# Patient Record
Sex: MALE | Race: WHITE | NOT HISPANIC OR LATINO | Employment: OTHER | URBAN - NONMETROPOLITAN AREA
[De-identification: names, ages, dates, MRNs, and addresses within clinical notes are randomized per-mention and may not be internally consistent; named-entity substitution may affect disease eponyms.]

---

## 2017-01-02 ENCOUNTER — PATIENT MESSAGE (OUTPATIENT)
Dept: MEDICAL GROUP | Facility: PHYSICIAN GROUP | Age: 72
End: 2017-01-02

## 2017-01-02 DIAGNOSIS — E78.2 MIXED HYPERLIPIDEMIA: ICD-10-CM

## 2017-01-04 RX ORDER — ATORVASTATIN CALCIUM 40 MG/1
40 TABLET, FILM COATED ORAL
Qty: 90 TAB | Refills: 3 | Status: SHIPPED | OUTPATIENT
Start: 2017-01-04 | End: 2017-12-12 | Stop reason: SDUPTHER

## 2017-01-04 NOTE — TELEPHONE ENCOUNTER
From: Jamie Pacheco  To: Trudy Mitchell M.D.  Sent: 1/2/2017 9:57 AM PST  Subject: Prescription Question    Good morning Dr Mitchell,  I have 2 items for you.  1. Did you really want me to take 2 tabs twice a day (4 total) of the Triam? I have only been taking 2 tabs a day.    2. I do not have any refills for my ATORVASTATIN 40mg. I will run out in 10 days. If you would send a new script to [facility] I would appreciate it.    maddy whyte

## 2017-02-25 ENCOUNTER — OFFICE VISIT (OUTPATIENT)
Dept: URGENT CARE | Facility: PHYSICIAN GROUP | Age: 72
End: 2017-02-25
Payer: MEDICARE

## 2017-02-25 VITALS
BODY MASS INDEX: 36.33 KG/M2 | WEIGHT: 225 LBS | OXYGEN SATURATION: 93 % | TEMPERATURE: 98.2 F | RESPIRATION RATE: 16 BRPM | DIASTOLIC BLOOD PRESSURE: 88 MMHG | HEART RATE: 77 BPM | SYSTOLIC BLOOD PRESSURE: 142 MMHG

## 2017-02-25 DIAGNOSIS — S86.912A STRAIN OF LEFT KNEE, INITIAL ENCOUNTER: ICD-10-CM

## 2017-02-25 PROCEDURE — 4040F PNEUMOC VAC/ADMIN/RCVD: CPT | Mod: 8P | Performed by: PHYSICIAN ASSISTANT

## 2017-02-25 PROCEDURE — G8484 FLU IMMUNIZE NO ADMIN: HCPCS | Performed by: PHYSICIAN ASSISTANT

## 2017-02-25 PROCEDURE — 99214 OFFICE O/P EST MOD 30 MIN: CPT | Performed by: PHYSICIAN ASSISTANT

## 2017-02-25 PROCEDURE — 3017F COLORECTAL CA SCREEN DOC REV: CPT | Performed by: PHYSICIAN ASSISTANT

## 2017-02-25 PROCEDURE — G8599 NO ASA/ANTIPLAT THER USE RNG: HCPCS | Performed by: PHYSICIAN ASSISTANT

## 2017-02-25 PROCEDURE — G8432 DEP SCR NOT DOC, RNG: HCPCS | Performed by: PHYSICIAN ASSISTANT

## 2017-02-25 PROCEDURE — G8419 CALC BMI OUT NRM PARAM NOF/U: HCPCS | Performed by: PHYSICIAN ASSISTANT

## 2017-02-25 PROCEDURE — 1036F TOBACCO NON-USER: CPT | Performed by: PHYSICIAN ASSISTANT

## 2017-02-25 PROCEDURE — 1101F PT FALLS ASSESS-DOCD LE1/YR: CPT | Mod: 8P | Performed by: PHYSICIAN ASSISTANT

## 2017-02-25 RX ORDER — HYDROCODONE BITARTRATE AND ACETAMINOPHEN 10; 325 MG/1; MG/1
1 TABLET ORAL EVERY 8 HOURS PRN
Qty: 20 TAB | Refills: 0 | Status: SHIPPED | OUTPATIENT
Start: 2017-02-25 | End: 2017-04-28

## 2017-02-25 NOTE — PROGRESS NOTES
"Chief Complaint   Patient presents with   • Knee Injury     L, <1 day       HISTORY OF PRESENT ILLNESS: Patient is a 71 y.o. male who presents today for evaluation of left knee pain. Patient states he \"blew it out\" walking up the stairs about an hour prior to arrival. Patient states he had immediate pain when he took a step with his left leg blisters. He felt something pop loose. He states it doesn't feel unstable but has severe pain in his left knee with ambulation. He has a history of a surgery but not replacement on both knees. He denies any swelling, ecchymosis, or other abnormalities. He does not feel any popping or clicking in the left knee. He has not taken any over-the-counter medication.    Patient Active Problem List    Diagnosis Date Noted   • Marital problems 12/08/2016   • Obesity (BMI 30-39.9) 12/08/2016   • Obesity (BMI 35.0-39.9 without comorbidity) ([facility]) 12/08/2016   • Chronic right shoulder pain 12/03/2015   • Left hip pain 02/26/2015   • Functional constipation 02/26/2015   • Personal history of skin cancer 08/22/2014   • Elevated fasting glucose 01/07/2014   • COPD (chronic obstructive pulmonary disease) (CMS-[facility]) 01/07/2014   • Essential hypertension, benign 06/28/2013   • CAD (coronary artery disease), native coronary artery 05/19/2011   • Vitamin D deficiency 05/14/2010   • Mixed hyperlipidemia    • Personal history of malignant neoplasm of bronchus and lung    • Personal history of malignant neoplasm of prostate    • Osteoarthritis of multiple joints        Allergies:Ampicillin; Clindamycin; and Demerol    Current Outpatient Prescriptions Ordered in [facility]   Medication Sig Dispense Refill   • hydrocodone/acetaminophen (NORCO)  MG Tab Take 1 Tab by mouth every 8 hours as needed for Moderate Pain or Severe Pain. 20 Tab 0   • atorvastatin (LIPITOR) 40 MG Tab Take 1 Tab by mouth every bedtime. 90 Tab 3   • triamterene/hctz (MAXZIDE-25/DYAZIDE) 37.5-25 MG Cap Take 2 Caps by mouth 2 times a " day. (Patient taking differently: Take 1 Cap by mouth 2 times a day.) 360 Cap 3   • etodolac (LODINE) 500 MG tablet Take 1 Tab by mouth 2 times a day. 180 Tab 3   • Cholecalciferol (CVS VITAMIN D) 2000 UNIT CAPS Take 3 Caps by mouth every bedtime. For vitamin D deficiency 30 Cap 11   • OCUVITE PO Take  by mouth every day. WITH ZINC      • albuterol 108 (90 BASE) MCG/ACT Aero Soln inhalation aerosol Inhale 2 Puffs by mouth every 6 hours as needed for Shortness of Breath. 1 Inhaler 3   • ipratropium (ATROVENT) 17 MCG/ACT Aero Soln Inhale 2 Puffs by mouth every 6 hours. 1 Inhaler 3   • guaifenesin-codeine (ROBITUSSIN AC) Solution oral solution Take 5 mL by mouth at bedtime as needed for Cough. 120 mL 0   • doxycycline (VIBRAMYCIN) 100 MG Tab Take 1 Tab by mouth 2 times a day. 20 Tab 0   • albuterol (PROVENTIL) 2.5mg/3ml Nebu Soln solution for nebulization 3 mL by Nebulization route every four hours as needed for Shortness of Breath. 75 mL 0   • ipratropium (ATROVENT) 0.02 % Solution 1 mL by Nebulization route 2 times a day. 60 Vial 0   • ipratropium-albuterol (COMBIVENT RESPIMAT)  MCG/ACT Aero Soln Inhale 1 Puff by mouth 4 times a day. 1 Inhaler 11   • albuterol (PROVENTIL) 2.5mg/3ml NEBU solution for nebulization 3 mL by Nebulization route every four hours as needed for Shortness of Breath. 75 mL 3     No current [facility]-ordered facility-administered medications on file.       Past Medical History   Diagnosis Date   • Mixed hyperlipidemia      CONTROLLED   • Coronary atherosclerosis      CONTROLLED   • Personal history of malignant neoplasm of bronchus and lung      STABLE   • Personal history of malignant neoplasm of prostate      STABLE   • Exposure      ORGANIC SOLVENT EXPOSURE IN THE 1980'S BUT HEAVY DOSES OF TYLENE AND XYLENE   • EKG abnormal 2000     ABN EKG W/OLD INFERIOR MI   • Colon polyps      NONCANCEROUS COLONIC POLYS 2004,MOST RECENT COLONOSCOPY 2005 WAS NEGATIVE   • CAD (coronary artery disease)       cad with an inconclusive thallium in 2004,  treated with medical management   • Spinal fusion      C4-6, SPINAL FUSION PER DR JAY   • Osteoarthrosis involving, or with mention of more than one site, but not specified as generalized, multiple sites      CONTROLLED   • Essential hypertension, malignant      CONTROLLED   • Diverticulitis      TREATED WITH HEMICOLECTOMY   • Nephrolithiasis    • Lung cancer (CMS-HCC)      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, LEFT UPPER LOBE LOBECTOMY AND 6 WEEKS OF RADIATION IN 2001   • Prostate cancer (CMS-HCC) 2006     TREATED WITH BRACHY THERAPY   • Unspecified vitamin D deficiency 5/14/2010   • Hand pain, right 5/14/2010   • Essential hypertension, benign 6/28/2013   • Arm pain, left 1/7/2014   • Prediabetes 1/7/2014       Social History   Substance Use Topics   • Smoking status: Former Smoker -- 3.00 packs/day for 38 years     Types: Cigarettes     Quit date: 01/20/2001   • Smokeless tobacco: Never Used   • Alcohol Use: 0.0 oz/week     0 Standard drinks or equivalent per week      Comment: OCCASIONAL       Family Status   Relation Status Death Age   • Mother Other      adopted no history   • Father Other      adopted and no history     Family History   Problem Relation Age of Onset   • Adopted: Yes       ROS:   Review of Systems   Constitutional: Negative for fever, chills, weight loss and malaise/fatigue.   HENT: Negative for ear pain, nosebleeds, congestion, sore throat and neck pain.    Eyes: Negative for blurred vision.   Respiratory: Negative for cough, sputum production, shortness of breath and wheezing.    Cardiovascular: Negative for chest pain, palpitations, orthopnea and leg swelling.   Gastrointestinal: Negative for heartburn, nausea, vomiting and abdominal pain.   Genitourinary: Negative for dysuria, urgency and frequency.       Exam:  Blood pressure 142/88, pulse 77, temperature 36.8 °C (98.2 °F), resp. rate 16, weight 102.059 kg (225 lb), SpO2 93  "%.  General: Normal appearing. No distress.  HEENT: Head is grossly normal.  Pulmonary:  No respiratory distress noted.  Extremities: Full range of motion left knee. No joint line tenderness noted. No tenderness noted on the medial or lateral collateral ligaments. Pain is not worsened with valgus or varus stress or anterior/posterior drawer. No laxity noted.  Skin: No soft tissue swelling, ecchymosis, erythema, or other abnormalities noted in the area of pain.  Psych: Normal mood. Alert and oriented x3. Judgment and insight is normal.    No x-ray available today    Assessment/Plan:  Advise following up with orthopedics. Placed a referral in case it is needed. Patient is asking for something for pain but states \"even morphine doesn't help.\" Will start with Norco to have advised discussing pain with orthopedics at his initial appointment.  Discussed potential side effects of Norco including but not limited to constipation. Discussed OTC stool softeners to use as needed. Do not drive on pain medication.  1. Strain of left knee, initial encounter  REFERRAL TO ORTHOPEDICS    hydrocodone/acetaminophen (NORCO)  MG Tab         "

## 2017-04-10 ENCOUNTER — OFFICE VISIT (OUTPATIENT)
Dept: CARDIOLOGY | Facility: MEDICAL CENTER | Age: 72
End: 2017-04-10
Payer: MEDICARE

## 2017-04-10 VITALS
OXYGEN SATURATION: 95 % | HEIGHT: 66 IN | BODY MASS INDEX: 36.16 KG/M2 | DIASTOLIC BLOOD PRESSURE: 80 MMHG | HEART RATE: 81 BPM | WEIGHT: 225 LBS | SYSTOLIC BLOOD PRESSURE: 134 MMHG

## 2017-04-10 DIAGNOSIS — I25.10 CORONARY ARTERY DISEASE INVOLVING NATIVE HEART WITHOUT ANGINA PECTORIS, UNSPECIFIED VESSEL OR LESION TYPE: ICD-10-CM

## 2017-04-10 DIAGNOSIS — I10 ESSENTIAL HYPERTENSION: ICD-10-CM

## 2017-04-10 DIAGNOSIS — Z01.810 PREOPERATIVE CARDIOVASCULAR EXAMINATION: Primary | ICD-10-CM

## 2017-04-10 LAB — EKG IMPRESSION: NORMAL

## 2017-04-10 PROCEDURE — 1101F PT FALLS ASSESS-DOCD LE1/YR: CPT | Mod: 8P | Performed by: INTERNAL MEDICINE

## 2017-04-10 PROCEDURE — 99204 OFFICE O/P NEW MOD 45 MIN: CPT | Performed by: INTERNAL MEDICINE

## 2017-04-10 PROCEDURE — G8432 DEP SCR NOT DOC, RNG: HCPCS | Performed by: INTERNAL MEDICINE

## 2017-04-10 PROCEDURE — 3017F COLORECTAL CA SCREEN DOC REV: CPT | Performed by: INTERNAL MEDICINE

## 2017-04-10 PROCEDURE — G8419 CALC BMI OUT NRM PARAM NOF/U: HCPCS | Performed by: INTERNAL MEDICINE

## 2017-04-10 PROCEDURE — G8599 NO ASA/ANTIPLAT THER USE RNG: HCPCS | Performed by: INTERNAL MEDICINE

## 2017-04-10 PROCEDURE — 4040F PNEUMOC VAC/ADMIN/RCVD: CPT | Mod: 8P | Performed by: INTERNAL MEDICINE

## 2017-04-10 PROCEDURE — 1036F TOBACCO NON-USER: CPT | Performed by: INTERNAL MEDICINE

## 2017-04-10 PROCEDURE — 93000 ELECTROCARDIOGRAM COMPLETE: CPT | Performed by: INTERNAL MEDICINE

## 2017-04-10 NOTE — PROGRESS NOTES
Arrhythmia Clinic Note (New patient)     DOS: 4/10/2017    Referring physician: Lu SHIRLEY    Chief complaint/Reason for consult: Pre-operative clearance    HPI:  Patient is a 70 yo WM with history of lung cancer s/p resection, diverticulitis s/p hemicolectomy, and ?history of CAD (based on abnormal echo and thallium in 2004 that was indeterminate), who is here for pre-operative examination for L knee surgery. He has no complaints other than his knee. He says he has good functional status, able before his knee issues to climb 2-3 flights of stairs without taking a break. He rides his exercise bike about an hour each day (over 15 miles he says). He denies chest pain or excessive MULTANI during any of this activity. He has been taking a statin, but no ASA as he takes chronic NSAIDs.    ROS (+ highlighted in red):  Constitutional: Fevers/chills/fatigue/weightloss  HEENT: Blurry vision/eye pain/sore throat/hearing loss  Respiratory: Shortness of breath/cough  Cardiovascular: Chest pain/palpitations/edema/orthopnea/syncope  GI: Nausea/vomitting/diarrhea  MSK: Arthralgias/myagias/muscle weakness  Skin: Rash/sores  Neurological: Numbness/tremors/vertigo  Endocrine: Excessive thirst/polyuria/cold intolerance/heat intolerance  Psych: Depression/anxiety    Past Medical History   Diagnosis Date   • Mixed hyperlipidemia      CONTROLLED   • Coronary atherosclerosis      CONTROLLED   • Personal history of malignant neoplasm of bronchus and lung      STABLE   • Personal history of malignant neoplasm of prostate      STABLE   • Exposure      ORGANIC SOLVENT EXPOSURE IN THE 1980'S BUT HEAVY DOSES OF TYLENE AND XYLENE   • EKG abnormal 2000     ABN EKG W/OLD INFERIOR MI   • Colon polyps      NONCANCEROUS COLONIC POLYS 2004,MOST RECENT COLONOSCOPY 2005 WAS NEGATIVE   • CAD (coronary artery disease)      cad with an inconclusive thallium in 2004,  treated with medical management   • Spinal fusion      C4-6, SPINAL FUSION PER   PIERRE   • Osteoarthrosis involving, or with mention of more than one site, but not specified as generalized, multiple sites      CONTROLLED   • Essential hypertension, malignant      CONTROLLED   • Diverticulitis      TREATED WITH HEMICOLECTOMY   • Nephrolithiasis    • Lung cancer (CMS-HCC)      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, LEFT UPPER LOBE LOBECTOMY AND 6 WEEKS OF RADIATION IN 2001   • Prostate cancer (CMS-HCC) 2006     TREATED WITH BRACHY THERAPY   • Unspecified vitamin D deficiency 5/14/2010   • Hand pain, right 5/14/2010   • Essential hypertension, benign 6/28/2013   • Arm pain, left 1/7/2014   • Prediabetes 1/7/2014       Past Surgical History   Procedure Laterality Date   • Biceps tendon repair  2008     NOW DOING PHYSICAL THERAPY   • Hernia repair       ABDOMINAL INCISIONAL HERNIA REPAIR X2   • Meniscus repair  2009   • Sigmoid colectomy  2001     secondary to ruptured diverticulitis       Social History     Social History   • Marital Status:      Spouse Name: N/A   • Number of Children: N/A   • Years of Education: N/A     Occupational History   • Not on file.     Social History Main Topics   • Smoking status: Former Smoker -- 3.00 packs/day for 38 years     Types: Cigarettes     Quit date: 01/20/2001   • Smokeless tobacco: Never Used   • Alcohol Use: 0.0 oz/week     0 Standard drinks or equivalent per week      Comment: OCCASIONAL   • Drug Use: No   • Sexual Activity:     Partners: Female     Birth Control/ Protection: Post-Menopausal      Comment: , bike repair      Other Topics Concern   • Exercise Yes     bikes and hikes most days     Social History Narrative       Family History   Problem Relation Age of Onset   • Adopted: Yes       Allergies   Allergen Reactions   • Ampicillin      CAUSED A CHILDHOOD REACTION   • Clindamycin Diarrhea   • Demerol      CAUSES ECCHYMOSIS AND IS INEFFECTIVE       Current Outpatient Prescriptions   Medication Sig Dispense  "Refill   • atorvastatin (LIPITOR) 40 MG Tab Take 1 Tab by mouth every bedtime. 90 Tab 3   • triamterene/hctz (MAXZIDE-25/DYAZIDE) 37.5-25 MG Cap Take 2 Caps by mouth 2 times a day. (Patient taking differently: Take 1 Cap by mouth 2 times a day.) 360 Cap 3   • albuterol 108 (90 BASE) MCG/ACT Aero Soln inhalation aerosol Inhale 2 Puffs by mouth every 6 hours as needed for Shortness of Breath. 1 Inhaler 3   • ipratropium (ATROVENT) 17 MCG/ACT Aero Soln Inhale 2 Puffs by mouth every 6 hours. 1 Inhaler 3   • albuterol (PROVENTIL) 2.5mg/3ml Nebu Soln solution for nebulization 3 mL by Nebulization route every four hours as needed for Shortness of Breath. 75 mL 0   • ipratropium (ATROVENT) 0.02 % Solution 1 mL by Nebulization route 2 times a day. 60 Vial 0   • etodolac (LODINE) 500 MG tablet Take 1 Tab by mouth 2 times a day. 180 Tab 3   • ipratropium-albuterol (COMBIVENT RESPIMAT)  MCG/ACT Aero Soln Inhale 1 Puff by mouth 4 times a day. 1 Inhaler 11   • albuterol (PROVENTIL) 2.5mg/3ml NEBU solution for nebulization 3 mL by Nebulization route every four hours as needed for Shortness of Breath. 75 mL 3   • Cholecalciferol (CVS VITAMIN D) 2000 UNIT CAPS Take 3 Caps by mouth every bedtime. For vitamin D deficiency 30 Cap 11   • OCUVITE PO Take  by mouth every day. WITH ZINC      • hydrocodone/acetaminophen (NORCO)  MG Tab Take 1 Tab by mouth every 8 hours as needed for Moderate Pain or Severe Pain. 20 Tab 0   • guaifenesin-codeine (ROBITUSSIN AC) Solution oral solution Take 5 mL by mouth at bedtime as needed for Cough. 120 mL 0   • doxycycline (VIBRAMYCIN) 100 MG Tab Take 1 Tab by mouth 2 times a day. 20 Tab 0     No current facility-administered medications for this visit.       Physical Exam:  Filed Vitals:    04/10/17 0817   BP: 134/80   Pulse: 81   Height: 1.676 m (5' 6\")   Weight: 102.059 kg (225 lb)   SpO2: 95%     General appearance: NAD, conversant   Eyes: anicteric sclerae, moist conjunctivae; no lid-lag; " PERRLA  HENT: Atraumatic; oropharynx clear with moist mucous membranes and no mucosal ulcerations; normal hard and soft palate  Neck: Trachea midline; FROM, supple, no thyromegaly or lymphadenopathy  Lungs: CTA, with normal respiratory effort and no intercostal retractions  CV: RRR, no MRGs, no JVD   Abdomen: Soft, non-tender; no masses or HSM  Extremities: No peripheral edema or extremity lymphadenopathy  Skin: Normal temperature, turgor and texture; no rash, ulcers or subcutaneous nodules  Psych: Appropriate affect, alert and oriented to person, place and time    Data:  Labs reviewed    EKG interpreted by me:  Incomplete RBBB, LAFB, sinus    Impression/Plan:  1. Preoperative cardiovascular examination     2. Coronary artery disease involving native heart without angina pectoris, unspecified vessel or lesion type  EKG   3. Essential hypertension       -I do not think he has significant obstructive CAD  -Furthermore, his functional status is >4 METS and for moderate risk surgery like his orthopedic procedure no further cardiac w/u needs to be done beforehand  -He is cleared for surgery  -I would like to get the records from his 2004 Thallium to confirm whether or not obstructive CAD is present or not so that this can be addressed or erased from his record    Shameka Sanchez MD

## 2017-04-10 NOTE — MR AVS SNAPSHOT
"        Jamie Pacheco   4/10/2017 8:20 AM   Office Visit   MRN: [id number]    Department:  [facility]   Dept Phone:  [phone]    Description:  Male : 1945   Provider:  Shameka Sanchez M.D.           Reason for Visit     Follow-Up           Allergies as of 4/10/2017     Allergen Noted Reactions    Ampicillin 2009       CAUSED A CHILDHOOD REACTION    Clindamycin 2009   Diarrhea    Demerol 2009       CAUSES ECCHYMOSIS AND IS INEFFECTIVE      You were diagnosed with     Essential hypertension   [[id number]]       Coronary artery disease involving native heart without angina pectoris, unspecified vessel or lesion type   [[id number]]         Vital Signs     Blood Pressure Pulse Height Weight Body Mass Index Oxygen Saturation    134/80 mmHg 81 1.676 m (5' 6\") 102.059 kg (225 lb) 36.33 kg/m2 95%    Smoking Status                   Former Smoker           Basic Information     Date Of Birth Sex Race Ethnicity Preferred Language    1945 Male White Non- English      Your appointments     2017  6:15 AM   Adult Draw/Collection with [facility] (--)    [address] [place] [address] [place] [address]   [phone]            2017  7:15 AM   Scheduled Pre Admission with [facility]   [facility] (--)    [address]  [place] [address]   [phone]            2017  8:00 AM   NEW TO YOU with DONNA Jarquin   [facility] [place]    [address]  [place] [id number] [phone]            2017  7:40 AM   Established Patient with DONNA Holt   [facility] [place])    [address]  [place] [address] [phone]           You will be receiving a confirmation call a few days before your appointment from our automated call confirmation system.              Problem List              ICD-10-CM Priority Class Noted - Resolved    Mixed hyperlipidemia E78.2 "   Unknown - Present    Personal history of malignant neoplasm of bronchus and lung Z85.118   Unknown - Present    Personal history of malignant neoplasm of prostate Z85.46   Unknown - Present    Osteoarthritis of multiple joints M15.9   Unknown - Present    Vitamin D deficiency (Chronic) E55.9   5/14/2010 - Present    CAD (coronary artery disease), native coronary artery I25.10   5/19/2011 - Present    Essential hypertension, benign I10   6/28/2013 - Present    Elevated fasting glucose R73.01   1/7/2014 - Present    COPD (chronic obstructive pulmonary disease) (CMS-[facility]) J44.9   1/7/2014 - Present    Personal history of skin cancer Z85.828   8/22/2014 - Present    Left hip pain M25.552   2/26/2015 - Present    Functional constipation K59.04   2/26/2015 - Present    Chronic right shoulder pain M25.511, G89.29   12/3/2015 - Present    Marital problems Z63.0   12/8/2016 - Present    Obesity (BMI 30-39.9) E66.9   12/8/2016 - Present    Obesity (BMI 35.0-39.9 without comorbidity) ([facility]) E66.9   12/8/2016 - Present      Health Maintenance        Date Due Completion Dates    IMM DTaP/Tdap/Td Vaccine (1 - Tdap) 12/10/1966 ---    IMM ZOSTER VACCINE 12/10/2007 ---    IMM PNEUMOCOCCAL 65+ (ADULT) LOW/MEDIUM RISK SERIES (1 of 2 - PCV13) 12/10/2012 ---    COLONOSCOPY 4/15/2021 4/15/2016            Results       Current Immunizations     Pneumococcal Vaccine (UF)Historical Data 1/1/2004      Below and/or attached are the medications your provider expects you to take. Review all of your home medications and newly ordered medications with your provider and/or pharmacist. Follow medication instructions as directed by your provider and/or pharmacist. Please keep your medication list with you and share with your provider. Update the information when medications are discontinued, doses are changed, or new medications (including over-the-counter products) are added; and carry medication information at all times in the event of emergency  situations     Allergies:  AMPICILLIN - (reactions not documented)     CLINDAMYCIN - Diarrhea     DEMEROL - (reactions not documented)               Medications  Valid as of: April 10, 2017 -  8:47 AM    Generic Name Brand Name Tablet Size Instructions for use    Albuterol Sulfate (Nebu Soln) PROVENTIL 2.5mg/3ml 3 mL by Nebulization route every four hours as needed for Shortness of Breath.        Albuterol Sulfate (Nebu Soln) PROVENTIL 2.5mg/3ml 3 mL by Nebulization route every four hours as needed for Shortness of Breath.        Albuterol Sulfate (Aero Soln) albuterol 108 (90 BASE) MCG/ACT Inhale 2 Puffs by mouth every 6 hours as needed for Shortness of Breath.        Atorvastatin Calcium (Tab) LIPITOR 40 MG Take 1 Tab by mouth every bedtime.        Cholecalciferol (Cap) Cholecalciferol 2000 UNIT Take 3 Caps by mouth every bedtime. For vitamin D deficiency        Doxycycline Hyclate (Tab) VIBRAMYCIN 100 MG Take 1 Tab by mouth 2 times a day.        Etodolac (Tab) LODINE 500 MG Take 1 Tab by mouth 2 times a day.        Guaifenesin-Codeine (Solution) ROBITUSSIN -10 mg/5mL Take 5 mL by mouth at bedtime as needed for Cough.        Hydrocodone-Acetaminophen (Tab) NORCO  MG Take 1 Tab by mouth every 8 hours as needed for Moderate Pain or Severe Pain.        Ipratropium Bromide (Solution) ATROVENT 0.02 % 1 mL by Nebulization route 2 times a day.        Ipratropium Bromide HFA (Aero Soln) ATROVENT 17 MCG/ACT Inhale 2 Puffs by mouth every 6 hours.        Ipratropium-Albuterol (Aero Soln) COMBIVENT RESPIMAT  MCG/ACT Inhale 1 Puff by mouth 4 times a day.        Multiple Vitamins-Minerals   Take  by mouth every day. WITH ZINC         Triamterene-HCTZ (Cap) MAXZIDE-25/DYAZIDE 37.5-25 MG Take 2 Caps by mouth 2 times a day.        .                 Medicines prescribed today were sent to:     [facility] [place], [place] - [address]    [address] [place] [address]    Phone:  [phone] Fax: [phone]    Open 24 Hours?: No      Medication refill instructions:       If your prescription bottle indicates you have medication refills left, it is not necessary to call your provider’s office. Please contact your pharmacy and they will refill your medication.    If your prescription bottle indicates you do not have any refills left, you may request refills at any time through one of the following ways: The online Birdbox system (except Urgent Care), by calling your provider’s office, or by asking your pharmacy to contact your provider’s office with a refill request. Medication refills are processed only during regular business hours and may not be available until the next business day. Your provider may request additional information or to have a follow-up visit with you prior to refilling your medication.   *Please Note: Medication refills are assigned a new Rx number when refilled electronically. Your pharmacy may indicate that no refills were authorized even though a new prescription for the same medication is available at the pharmacy. Please request the medicine by name with the pharmacy before contacting your provider for a refill.           Birdbox Access Code: Activation code not generated  Current Birdbox Status: Active

## 2017-04-20 ENCOUNTER — HOSPITAL ENCOUNTER (OUTPATIENT)
Dept: LAB | Facility: MEDICAL CENTER | Age: 72
End: 2017-04-20
Attending: INTERNAL MEDICINE
Payer: MEDICARE

## 2017-04-20 DIAGNOSIS — R73.01 ELEVATED FASTING GLUCOSE: ICD-10-CM

## 2017-04-20 DIAGNOSIS — E55.9 VITAMIN D DEFICIENCY: Chronic | ICD-10-CM

## 2017-04-20 DIAGNOSIS — E78.2 MIXED HYPERLIPIDEMIA: ICD-10-CM

## 2017-04-20 LAB
25(OH)D3 SERPL-MCNC: 38 NG/ML (ref 30–100)
EST. AVERAGE GLUCOSE BLD GHB EST-MCNC: 131 MG/DL
HBA1C MFR BLD: 6.2 % (ref 0–5.6)

## 2017-04-20 PROCEDURE — 80061 LIPID PANEL: CPT

## 2017-04-20 PROCEDURE — 83036 HEMOGLOBIN GLYCOSYLATED A1C: CPT | Mod: GA

## 2017-04-20 PROCEDURE — 36415 COLL VENOUS BLD VENIPUNCTURE: CPT | Mod: GA

## 2017-04-20 PROCEDURE — 82306 VITAMIN D 25 HYDROXY: CPT

## 2017-04-20 PROCEDURE — 80053 COMPREHEN METABOLIC PANEL: CPT

## 2017-04-21 ENCOUNTER — HOSPITAL ENCOUNTER (OUTPATIENT)
Dept: RADIOLOGY | Facility: MEDICAL CENTER | Age: 72
End: 2017-04-21
Attending: INTERNAL MEDICINE | Admitting: ORTHOPAEDIC SURGERY
Payer: MEDICARE

## 2017-04-21 DIAGNOSIS — Z01.812 PRE-OPERATIVE LABORATORY EXAMINATION: ICD-10-CM

## 2017-04-21 DIAGNOSIS — J43.9 PULMONARY EMPHYSEMA, UNSPECIFIED EMPHYSEMA TYPE (HCC): ICD-10-CM

## 2017-04-21 LAB
ALBUMIN SERPL BCP-MCNC: 4.3 G/DL (ref 3.2–4.9)
ALBUMIN/GLOB SERPL: 1.3 G/DL
ALP SERPL-CCNC: 54 U/L (ref 30–99)
ALT SERPL-CCNC: 24 U/L (ref 2–50)
ANION GAP SERPL CALC-SCNC: 10 MMOL/L (ref 0–11.9)
ANION GAP SERPL CALC-SCNC: 8 MMOL/L (ref 0–11.9)
APPEARANCE UR: CLEAR
AST SERPL-CCNC: 21 U/L (ref 12–45)
BILIRUB SERPL-MCNC: 0.7 MG/DL (ref 0.1–1.5)
BILIRUB UR QL STRIP.AUTO: ABNORMAL
BUN SERPL-MCNC: 15 MG/DL (ref 8–22)
BUN SERPL-MCNC: 15 MG/DL (ref 8–22)
CALCIUM SERPL-MCNC: 9.8 MG/DL (ref 8.5–10.5)
CALCIUM SERPL-MCNC: 9.9 MG/DL (ref 8.5–10.5)
CHLORIDE SERPL-SCNC: 101 MMOL/L (ref 96–112)
CHLORIDE SERPL-SCNC: 103 MMOL/L (ref 96–112)
CHOLEST SERPL-MCNC: 116 MG/DL (ref 100–199)
CO2 SERPL-SCNC: 27 MMOL/L (ref 20–33)
CO2 SERPL-SCNC: 30 MMOL/L (ref 20–33)
COLOR UR: ABNORMAL
CREAT SERPL-MCNC: 1.17 MG/DL (ref 0.5–1.4)
CREAT SERPL-MCNC: 1.18 MG/DL (ref 0.5–1.4)
ERYTHROCYTE [DISTWIDTH] IN BLOOD BY AUTOMATED COUNT: 40.8 FL (ref 35.9–50)
GFR SERPL CREATININE-BSD FRML MDRD: >60 ML/MIN/1.73 M 2
GFR SERPL CREATININE-BSD FRML MDRD: >60 ML/MIN/1.73 M 2
GLOBULIN SER CALC-MCNC: 3.3 G/DL (ref 1.9–3.5)
GLUCOSE SERPL-MCNC: 92 MG/DL (ref 65–99)
GLUCOSE SERPL-MCNC: 93 MG/DL (ref 65–99)
GLUCOSE UR STRIP.AUTO-MCNC: NEGATIVE MG/DL
HCT VFR BLD AUTO: 46.1 % (ref 42–52)
HDLC SERPL-MCNC: 38 MG/DL
HGB BLD-MCNC: 15.5 G/DL (ref 14–18)
KETONES UR STRIP.AUTO-MCNC: NEGATIVE MG/DL
LDLC SERPL CALC-MCNC: 45 MG/DL
LEUKOCYTE ESTERASE UR QL STRIP.AUTO: NEGATIVE
MCH RBC QN AUTO: 28.5 PG (ref 27–33)
MCHC RBC AUTO-ENTMCNC: 33.6 G/DL (ref 33.7–35.3)
MCV RBC AUTO: 84.7 FL (ref 81.4–97.8)
MICRO URNS: ABNORMAL
NITRITE UR QL STRIP.AUTO: NEGATIVE
PH UR STRIP.AUTO: 7 [PH]
PLATELET # BLD AUTO: 271 K/UL (ref 164–446)
PMV BLD AUTO: 10.9 FL (ref 9–12.9)
POTASSIUM SERPL-SCNC: 3.7 MMOL/L (ref 3.6–5.5)
POTASSIUM SERPL-SCNC: 4.1 MMOL/L (ref 3.6–5.5)
PROT SERPL-MCNC: 7.6 G/DL (ref 6–8.2)
PROT UR QL STRIP: NEGATIVE MG/DL
RBC # BLD AUTO: 5.44 M/UL (ref 4.7–6.1)
RBC UR QL AUTO: NEGATIVE
SCCMEC + MECA PNL NOSE NAA+PROBE: NEGATIVE
SCCMEC + MECA PNL NOSE NAA+PROBE: POSITIVE
SODIUM SERPL-SCNC: 138 MMOL/L (ref 135–145)
SODIUM SERPL-SCNC: 141 MMOL/L (ref 135–145)
SP GR UR STRIP.AUTO: 1.01
TRIGL SERPL-MCNC: 165 MG/DL (ref 0–149)
WBC # BLD AUTO: 10.7 K/UL (ref 4.8–10.8)

## 2017-04-21 PROCEDURE — 85027 COMPLETE CBC AUTOMATED: CPT

## 2017-04-21 PROCEDURE — 36415 COLL VENOUS BLD VENIPUNCTURE: CPT

## 2017-04-21 PROCEDURE — 81003 URINALYSIS AUTO W/O SCOPE: CPT

## 2017-04-21 PROCEDURE — 87086 URINE CULTURE/COLONY COUNT: CPT

## 2017-04-21 PROCEDURE — 87641 MR-STAPH DNA AMP PROBE: CPT

## 2017-04-21 PROCEDURE — 80048 BASIC METABOLIC PNL TOTAL CA: CPT

## 2017-04-21 PROCEDURE — 71020 DX-CHEST-2 VIEWS: CPT

## 2017-04-21 PROCEDURE — 87640 STAPH A DNA AMP PROBE: CPT | Mod: XU

## 2017-04-21 NOTE — OR NURSING
Pre-admit appointment completed. Pt instructed to continue regularly prescribed medications through the day before surgery. Pt instructed to take the following medications the day of surgery with a sip of water, per anesthesia protocol; If needed: atrovent nebulizer/MDI, albuterol nebulizer/MDI, or norco.     Pt to bring rescue inhalers DOS.    JUNE education given. Encourage pt to F/U with his PCP regarding score on JUNE screening tool. Pt voices understanding.

## 2017-04-23 LAB
BACTERIA UR CULT: NORMAL
SIGNIFICANT IND 70042: NORMAL
SITE SITE: NORMAL
SOURCE SOURCE: NORMAL

## 2017-04-28 ENCOUNTER — OFFICE VISIT (OUTPATIENT)
Dept: MEDICAL GROUP | Facility: PHYSICIAN GROUP | Age: 72
End: 2017-04-28
Payer: MEDICARE

## 2017-04-28 VITALS
TEMPERATURE: 98.4 F | OXYGEN SATURATION: 96 % | WEIGHT: 225 LBS | RESPIRATION RATE: 16 BRPM | SYSTOLIC BLOOD PRESSURE: 142 MMHG | HEIGHT: 66 IN | BODY MASS INDEX: 36.16 KG/M2 | HEART RATE: 85 BPM | DIASTOLIC BLOOD PRESSURE: 86 MMHG

## 2017-04-28 DIAGNOSIS — J43.9 PULMONARY EMPHYSEMA, UNSPECIFIED EMPHYSEMA TYPE (HCC): ICD-10-CM

## 2017-04-28 DIAGNOSIS — I10 ESSENTIAL HYPERTENSION, BENIGN: ICD-10-CM

## 2017-04-28 DIAGNOSIS — M25.562 PAIN IN LATERAL PORTION OF LEFT KNEE: ICD-10-CM

## 2017-04-28 DIAGNOSIS — E78.2 MIXED HYPERLIPIDEMIA: ICD-10-CM

## 2017-04-28 DIAGNOSIS — I25.119 CORONARY ARTERY DISEASE INVOLVING NATIVE CORONARY ARTERY OF NATIVE HEART WITH ANGINA PECTORIS (HCC): ICD-10-CM

## 2017-04-28 DIAGNOSIS — E66.9 OBESITY (BMI 35.0-39.9 WITHOUT COMORBIDITY): ICD-10-CM

## 2017-04-28 DIAGNOSIS — R73.01 ELEVATED FASTING GLUCOSE: ICD-10-CM

## 2017-04-28 DIAGNOSIS — M15.9 PRIMARY OSTEOARTHRITIS INVOLVING MULTIPLE JOINTS: ICD-10-CM

## 2017-04-28 PROCEDURE — G8432 DEP SCR NOT DOC, RNG: HCPCS | Performed by: NURSE PRACTITIONER

## 2017-04-28 PROCEDURE — 4040F PNEUMOC VAC/ADMIN/RCVD: CPT | Mod: 8P | Performed by: NURSE PRACTITIONER

## 2017-04-28 PROCEDURE — 99214 OFFICE O/P EST MOD 30 MIN: CPT | Performed by: NURSE PRACTITIONER

## 2017-04-28 PROCEDURE — G8419 CALC BMI OUT NRM PARAM NOF/U: HCPCS | Performed by: NURSE PRACTITIONER

## 2017-04-28 PROCEDURE — G8598 ASA/ANTIPLAT THER USED: HCPCS | Performed by: NURSE PRACTITIONER

## 2017-04-28 PROCEDURE — 1101F PT FALLS ASSESS-DOCD LE1/YR: CPT | Performed by: NURSE PRACTITIONER

## 2017-04-28 PROCEDURE — 3017F COLORECTAL CA SCREEN DOC REV: CPT | Performed by: NURSE PRACTITIONER

## 2017-04-28 PROCEDURE — 1036F TOBACCO NON-USER: CPT | Performed by: NURSE PRACTITIONER

## 2017-04-28 RX ORDER — OXYCODONE HYDROCHLORIDE AND ACETAMINOPHEN 5; 325 MG/1; MG/1
TABLET ORAL
Status: ON HOLD | COMMUNITY
Start: 2017-04-27 | End: 2017-05-05

## 2017-04-28 RX ORDER — CELECOXIB 200 MG/1
CAPSULE ORAL
Status: ON HOLD | COMMUNITY
Start: 2017-04-27 | End: 2017-05-05

## 2017-04-28 RX ORDER — ASPIRIN 325 MG
TABLET ORAL
COMMUNITY
Start: 2017-04-27 | End: 2017-12-12

## 2017-04-28 ASSESSMENT — PATIENT HEALTH QUESTIONNAIRE - PHQ9: CLINICAL INTERPRETATION OF PHQ2 SCORE: 0

## 2017-04-28 NOTE — PATIENT INSTRUCTIONS
Follow up with me in 6 months, sooner if needed    Labs before follow up visit    Continue meds--call for refills as needed

## 2017-04-28 NOTE — MR AVS SNAPSHOT
"        Jamie Pacheco   2017 8:00 AM   Office Visit   MRN: [id number]    Department:  [facility]   Dept Phone:  [phone]    Description:  Male : 1945   Provider:  DONNA Jarquin           Reason for Visit     Establish Care fv chest xray      Allergies as of 2017     Allergen Noted Reactions    Ampicillin 2009       CAUSED A CHILDHOOD REACTION    Clindamycin 2009   Diarrhea    Demerol 2009       CAUSES ECCHYMOSIS AND IS INEFFECTIVE      You were diagnosed with     Mixed hyperlipidemia   [272.2.ICD-9-CM]       Essential hypertension, benign   [401.1.ICD-9-CM]       Obesity (BMI 35.0-39.9 without comorbidity) ([facility])   [981887]       Pain in lateral portion of left knee   [463352]       Primary osteoarthritis involving multiple joints   [0381493]       Coronary artery disease involving native coronary artery of native heart with angina pectoris (CMS-HCC)   [7139468]       Pulmonary emphysema, unspecified emphysema type (CMS-HCC)   [7407715]       Elevated fasting glucose   [545657]         Vital Signs     Blood Pressure Pulse Temperature Respirations Height Weight    142/86 mmHg 85 36.9 °C (98.4 °F) 16 1.676 m (5' 6\") 102.059 kg (225 lb)    Body Mass Index Oxygen Saturation Smoking Status             36.33 kg/m2 96% Former Smoker         Basic Information     Date Of Birth Sex Race Ethnicity Preferred Language    1945 Male White Non- English      Your appointments     2017  7:40 AM   Established Patient with DONNA Holt   [facility] ([place])    [address] [place] [id number] [phone]           You will be receiving a confirmation call a few days before your appointment from our automated call confirmation system.              Problem List              ICD-10-CM Priority Class Noted - Resolved    Mixed hyperlipidemia E78.2   Unknown - Present    Personal history of malignant neoplasm of " bronchus and lung Z85.118   Unknown - Present    Personal history of malignant neoplasm of prostate Z85.46   Unknown - Present    Osteoarthritis of multiple joints M15.9   Unknown - Present    Vitamin D deficiency (Chronic) E55.9   5/14/2010 - Present    CAD (coronary artery disease), native coronary artery I25.10   5/19/2011 - Present    Essential hypertension, benign I10   6/28/2013 - Present    Elevated fasting glucose R73.01   1/7/2014 - Present    COPD (chronic obstructive pulmonary disease) (CMS-[facility]) J44.9   1/7/2014 - Present    Personal history of skin cancer Z85.828   8/22/2014 - Present    Left hip pain M25.552   2/26/2015 - Present    Functional constipation K59.04   2/26/2015 - Present    Chronic right shoulder pain M25.511, G89.29   12/3/2015 - Present    Marital problems Z63.0   12/8/2016 - Present    Obesity (BMI 30-39.9) E66.9   12/8/2016 - Present    Obesity (BMI 35.0-39.9 without comorbidity) ([facility]) E66.9   12/8/2016 - Present    Pain in lateral portion of left knee M25.562   4/28/2017 - Present      Health Maintenance        Date Due Completion Dates    IMM DTaP/Tdap/Td Vaccine (1 - Tdap) 12/10/1966 ---    IMM ZOSTER VACCINE 12/10/2007 ---    IMM PNEUMOCOCCAL 65+ (ADULT) LOW/MEDIUM RISK SERIES (1 of 2 - PCV13) 12/10/2012 ---    COLONOSCOPY 4/15/2021 4/15/2016            Current Immunizations     Pneumococcal Vaccine (UF)Historical Data 1/1/2004      Below and/or attached are the medications your provider expects you to take. Review all of your home medications and newly ordered medications with your provider and/or pharmacist. Follow medication instructions as directed by your provider and/or pharmacist. Please keep your medication list with you and share with your provider. Update the information when medications are discontinued, doses are changed, or new medications (including over-the-counter products) are added; and carry medication information at all times in the event of emergency situations      Allergies:  AMPICILLIN - (reactions not documented)     CLINDAMYCIN - Diarrhea     DEMEROL - (reactions not documented)               Medications  Valid as of: April 28, 2017 -  8:29 AM    Generic Name Brand Name Tablet Size Instructions for use    Albuterol Sulfate (Nebu Soln) PROVENTIL 2.5mg/3ml 3 mL by Nebulization route every four hours as needed for Shortness of Breath.        Albuterol Sulfate (Aero Soln) albuterol 108 (90 BASE) MCG/ACT Inhale 2 Puffs by mouth every 6 hours as needed for Shortness of Breath.        Aspirin (Tab)  MG         Atorvastatin Calcium (Tab) LIPITOR 40 MG Take 1 Tab by mouth every bedtime.        Celecoxib (Cap) CELEBREX 200 MG         Cholecalciferol (Cap) vitamin D3 5000 UNITS Take 1 Cap by mouth every day.        Etodolac (Tab) LODINE 500 MG Take 1 Tab by mouth 2 times a day.        Ipratropium Bromide (Solution) ATROVENT 0.02 % 1 mL by Nebulization route 2 times a day.        Ipratropium Bromide HFA (Aero Soln) ATROVENT 17 MCG/ACT Inhale 2 Puffs by mouth every 6 hours.        Multiple Vitamins-Minerals   Take  by mouth every day. WITH ZINC         Mupirocin (Ointment) BACTROBAN 2 %         Oxycodone-Acetaminophen (Tab) PERCOCET 5-325 MG         Probiotic Product   Take  by mouth every day.        Simethicone   Take  by mouth as needed.        Triamterene-HCTZ (Cap) MAXZIDE-25/DYAZIDE 37.5-25 MG Take 2 Caps by mouth 2 times a day.        .                 Medicines prescribed today were sent to:     [facility] [address] [place] [id number]    Phone: [phone] Fax: [phone]    Open 24 Hours?: No      Medication refill instructions:       If your prescription bottle indicates you have medication refills left, it is not necessary to call your provider’s office. Please contact your pharmacy and they will refill your medication.    If your prescription bottle indicates you do not have any refills left,  you may request refills at any time through one of the following ways: The online [employer] system (except Urgent Care), by calling your provider’s office, or by asking your pharmacy to contact your provider’s office with a refill request. Medication refills are processed only during regular business hours and may not be available until the next business day. Your provider may request additional information or to have a follow-up visit with you prior to refilling your medication.   *Please Note: Medication refills are assigned a new Rx number when refilled electronically. Your pharmacy may indicate that no refills were authorized even though a new prescription for the same medication is available at the pharmacy. Please request the medicine by name with the pharmacy before contacting your provider for a refill.        Your To Do List     Future Labs/Procedures Complete By Expires    COMP METABOLIC PANEL  As directed 4/28/2018    HEMOGLOBIN A1C  As directed 4/28/2018    LIPID PROFILE  As directed 4/28/2018      Instructions    Follow up with me in 6 months, sooner if needed    Labs before follow up visit    Continue meds--call for refills as needed              [employer] Access Code: Activation code not generated  Current [employer] Status: Active

## 2017-04-28 NOTE — ASSESSMENT & PLAN NOTE
This is a chronic condition, stable and well-controlled on current regimen. He does denies symptoms of hypertension. He does not need refills at this time, they can call when needed. We reviewed labs today, they are all essentially within normal limits. We will repeat in 6 months, these have been ordered.

## 2017-04-28 NOTE — ASSESSMENT & PLAN NOTE
Patient has significant pain in his left knee due to osteoarthritis and has upcoming knee replacement surgery scheduled for May 5. He has been cleared by cardiology.

## 2017-04-28 NOTE — ASSESSMENT & PLAN NOTE
This is a chronic condition, stable and well controlled on statin therapy. His lipid profile is as follows:  Component      Latest Ref Rng 4/20/2017           6:08 AM   Cholesterol,Tot      100 - 199 mg/dL 116   Triglycerides      0 - 149 mg/dL 165 (H)   HDL      >=40 mg/dL 38 (A)   LDL      <100 mg/dL 45   He does not need refills on his medication at this time, but can call when needed. He is to continue on low-fat, low-cholesterol diet. We will repeat this in 6 months.

## 2017-04-28 NOTE — ASSESSMENT & PLAN NOTE
This is chronic, uncontrolled. His weight is 225, BMI 36.32. He does understand that his obesity has a negative effect on his health and is currently trying to lose weight. He is trying to eat a healthier diet and states that his symptoms he recovers from his upcoming knee surgery he is going to increase his physical activity. We'll reassess this in 6 months has follow-up appointment.

## 2017-04-28 NOTE — PROGRESS NOTES
Chief Complaint   Patient presents with   • Establish Care     fv chest xray         This is a 71 y.o.male patient that presents today with the following: Establish care with new PCP, discuss acute and chronic conditions, review labs    CAD (coronary artery disease), native coronary artery  This is a chronic condition, currently stable and he is followed by cardiology. In fact he just had an appointment for clearance for upcoming knee surgery. He tolerates all of his medications well with no significant bothersome side effects.    COPD (chronic obstructive pulmonary disease)  This is a chronic condition, stable and well-controlled on current medications including albuterol and Atrovent. He tolerates his medications well with no significant bothersome side effects. He does not need refills at this time, but can call when needed. We will monitor this at least every 6 months and as needed.    Elevated fasting glucose  Patient has history of elevated fasting glucose, his most recent hemoglobin A1c was 6.2%. He is going to continue working on lifestyle modifications including healthy diet and continued efforts towards weight loss. It is difficult for him at this time to increase his physical activity due to left knee pain. He has upcoming left knee replacement surgery scheduled for May 5. He plans on being able to really work on weight loss once he is recovered from the surgery.    Essential hypertension, benign  This is a chronic condition, stable and well-controlled on current regimen. He does denies symptoms of hypertension. He does not need refills at this time, they can call when needed. We reviewed labs today, they are all essentially within normal limits. We will repeat in 6 months, these have been ordered.    Mixed hyperlipidemia  This is a chronic condition, stable and well controlled on statin therapy. His lipid profile is as follows:  Component      Latest Ref Rng 4/20/2017           6:08 AM    Cholesterol,Tot      100 - 199 mg/dL 116   Triglycerides      0 - 149 mg/dL 165 (H)   HDL      >=40 mg/dL 38 (A)   LDL      <100 mg/dL 45   He does not need refills on his medication at this time, but can call when needed. He is to continue on low-fat, low-cholesterol diet. We will repeat this in 6 months.    Obesity (BMI 35.0-39.9 without comorbidity) (HCC)  This is chronic, uncontrolled. His weight is 225, BMI 36.32. He does understand that his obesity has a negative effect on his health and is currently trying to lose weight. He is trying to eat a healthier diet and states that his symptoms he recovers from his upcoming knee surgery he is going to increase his physical activity. We'll reassess this in 6 months has follow-up appointment.    Pain in lateral portion of left knee  Patient has significant pain in his left knee due to osteoarthritis and has upcoming knee replacement surgery scheduled for May 5. He has been cleared by cardiology.      Orders Only on 04/21/2017   Component Date Value   • Sodium 04/21/2017 138    • Potassium 04/21/2017 3.7    • Chloride 04/21/2017 103    • Co2 04/21/2017 27    • Glucose 04/21/2017 92    • Bun 04/21/2017 15    • Creatinine 04/21/2017 1.17    • Calcium 04/21/2017 9.8    • Anion Gap 04/21/2017 8.0    • Significant Indicator 04/21/2017 NEG    • Source 04/21/2017 UR    • Urine Culture 04/21/2017 No growth at 48 hours    • WBC 04/21/2017 10.7    • RBC 04/21/2017 5.44    • Hemoglobin 04/21/2017 15.5    • Hematocrit 04/21/2017 46.1    • MCV 04/21/2017 84.7    • MCH 04/21/2017 28.5    • MCHC 04/21/2017 33.6*   • RDW 04/21/2017 40.8    • Platelet Count 04/21/2017 271    • MPV 04/21/2017 10.9    • Color 04/21/2017 Lt. Yellow    • Character 04/21/2017 Clear    • Specific Gravity 04/21/2017 1.011    • Ph 04/21/2017 7.0    • Glucose 04/21/2017 Negative    • Ketones 04/21/2017 Negative    • Protein 04/21/2017 Negative    • Bilirubin 04/21/2017 Moderate*   • Nitrite 04/21/2017 Negative     • Leukocyte Esterase 2017 Negative    • Occult Blood 2017 Negative    • Micro Urine Req 2017 see below    • Staph aureus by PCR 2017 POSITIVE*   • MRSA by PCR 2017 Negative    • GFR If  2017 >60    • GFR If Non  Ameri* 2017 >60    Hospital Outpatient Visit on 2017   Component Date Value   • Glycohemoglobin 2017 6.2*   • Est Avg Glucose 2017 131    • Sodium 2017 141    • Potassium 2017 4.1    • Chloride 2017 101    • Co2 2017 30    • Anion Gap 2017 10.0    • Glucose 2017 93    • Bun 2017 15    • Creatinine 2017 1.18    • Calcium 2017 9.9    • AST(SGOT) 2017 21    • ALT(SGPT) 2017 24    • Alkaline Phosphatase 2017 54    • Total Bilirubin 2017 0.7    • Albumin 2017 4.3    • Total Protein 2017 7.6    • Globulin 2017 3.3    • A-G Ratio 2017 1.3    • Cholesterol,Tot 2017 116    • Triglycerides 2017 165*   • HDL 2017 38*   • LDL 2017 45    • 25-Hydroxy   Vitamin D 25 2017 38    • GFR If  2017 >60    • GFR If Non  Ameri* 2017 >60    Office Visit on 04/10/2017   Component Date Value   • Report 04/10/2017                      Value:[facility]    Test Date:  2017-04-10  Pt Name:    OSMANY RAMOS                Department: [facility]  MRN:        [id number]                      Room:  Gender:     M                            Technician:   :        [id number]                   Requested By:SHAMEKA SANCHEZ  Order #:    [id number]                    Reading MD: Shameka Sanchez MD    Measurements  Intervals                                Axis  Rate:       70                           P:          56  OR:         200                          QRS:        -31  QRSD:       120                          T:          53  QT:         404  QTc:        436    Interpretive Statements  SINUS  RHYTHM  INCOMPLETE RBBB AND LAFB  No previous ECG available for comparison    Electronically Signed On 4- 9:38:29 PDT by Shameka Sanchez MD           clinical course has been stable    Past Medical History   Diagnosis Date   • Mixed hyperlipidemia      CONTROLLED   • Coronary atherosclerosis      CONTROLLED   • Personal history of malignant neoplasm of bronchus and lung      STABLE   • Personal history of malignant neoplasm of prostate      STABLE   • Exposure      ORGANIC SOLVENT EXPOSURE IN THE 1980'S BUT HEAVY DOSES OF TYLENE AND XYLENE   • EKG abnormal 2000     ABN EKG W/OLD INFERIOR MI   • Colon polyps      NONCANCEROUS COLONIC POLYS 2004,MOST RECENT COLONOSCOPY 2005 WAS NEGATIVE   • CAD (coronary artery disease)      cad with an inconclusive thallium in 2004,  treated with medical management   • Spinal fusion      C4-6, SPINAL FUSION PER DR JAY   • Osteoarthrosis involving, or with mention of more than one site, but not specified as generalized, multiple sites      CONTROLLED   • Essential hypertension, malignant      CONTROLLED   • Diverticulitis      TREATED WITH HEMICOLECTOMY   • Lung cancer (CMS-HCC)      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 NO FURTHER PROBLEMS WITH FOLLOWING SURGERIES.    • Nephrolithiasis 1967, 1968   • Cancer (CMS-HCC) 2002, 2006     Lung (2002)-surgery and radiation. Prostate (2006)-brachytherapy   • Carcinoma in situ of respiratory system 1998     Upper lung lobectomy and radiation therapy   • Breath shortness 4/21/17     States only  "with exess exertion. No changes to status.   • Cataract 2007     Bilateral phaco with IOL   • Pneumonia 2015     Also had previously   • Hand pain, right 5/14/2010   • Arm pain, left 1/7/2014   • Pain 4/21/17     \"all over\"       Past Surgical History   Procedure Laterality Date   • Biceps tendon repair Right 2008   • Hernia repair  2002, 2003     Abdominal incisional hernia, 2003 with mesh   • Knee arthroscopy Right 2009     meniscus tear   • Sigmoid colectomy  2001     secondary to ruptured diverticulitis   • Trigger finger release Right 2009     index finger   • Knee arthroscopy Left 2003   • Bronchoscopy  2002     Cancer   • Lobectomy Left 2002     Upper lung bronchial tube for CA   • Cervical disk and fusion anterior  2008     C4-C5   • Cataract phaco with iol Bilateral 2007   • Brachy therapy  2006     Prostate CA   • Tonsillectomy  as child   • Dental extraction(s)  1969     wisdom teeth   • Colonoscopy  2001-present     every 3 years       Family History   Problem Relation Age of Onset   • Adopted: Yes   • Diabetes Neg Hx        Ampicillin; Clindamycin; and Demerol    Current Outpatient Prescriptions Ordered in [facility]   Medication Sig Dispense Refill   • Probiotic Product (PROBIOTIC DAILY PO) Take  by mouth every day.     • Simethicone (GAS RELIEF EXTRA STRENGTH PO) Take  by mouth as needed.     • Cholecalciferol (VITAMIN D3) 5000 UNITS Cap Take 1 Cap by mouth every day.     • atorvastatin (LIPITOR) 40 MG Tab Take 1 Tab by mouth every bedtime. (Patient taking differently: Take 20 mg by mouth every bedtime.) 90 Tab 3   • triamterene/hctz (MAXZIDE-25/DYAZIDE) 37.5-25 MG Cap Take 2 Caps by mouth 2 times a day. (Patient taking differently: Take 2 Caps by mouth every day.) 360 Cap 3   • albuterol 108 (90 BASE) MCG/ACT Aero Soln inhalation aerosol Inhale 2 Puffs by mouth every 6 hours as needed for Shortness of Breath. 1 Inhaler 3   • ipratropium (ATROVENT) 17 MCG/ACT Aero Soln Inhale 2 Puffs by mouth every 6 hours. " "(Patient taking differently: Inhale 2 Puffs by mouth every 6 hours as needed.) 1 Inhaler 3   • etodolac (LODINE) 500 MG tablet Take 1 Tab by mouth 2 times a day. 180 Tab 3   • albuterol (PROVENTIL) 2.5mg/3ml NEBU solution for nebulization 3 mL by Nebulization route every four hours as needed for Shortness of Breath. 75 mL 3   • OCUVITE PO Take  by mouth every day. WITH ZINC      • celecoxib (CELEBREX) 200 MG Cap      • aspirin (ASA) 325 MG Tab      • oxycodone-acetaminophen (PERCOCET) 5-325 MG Tab      • mupirocin (BACTROBAN) 2 % Ointment      • ipratropium (ATROVENT) 0.02 % Solution 1 mL by Nebulization route 2 times a day. (Patient taking differently: 0.2 mg by Nebulization route 2 times a day as needed.) 60 Vial 0     No current Epic-ordered facility-administered medications on file.       Constitutional ROS: No unexpected change in weight, No weakness, No unexplained fevers, sweats, or chills  Pulmonary ROS: No chronic cough, sputum, or hemoptysis, No shortness of breath, No recent change in breathing, Positive for COPD, stable  Cardiovascular ROS: No chest pain, No edema, No palpitations, Positive for hypertension, controlled  Gastrointestinal ROS: No abdominal pain, No nausea, vomiting, diarrhea, or constipation, no blood in stool  Musculoskeletal/Extremities ROS: Positive per history of present illness  Neurologic ROS: Normal development, No seizures, No weakness  All other systems reviewed and are within normal limits    Physical exam:  /86 mmHg  Pulse 85  Temp(Src) 36.9 °C (98.4 °F)  Resp 16  Ht 1.676 m (5' 6\")  Wt 102.059 kg (225 lb)  BMI 36.33 kg/m2  SpO2 96%  General Appearance: Elderly male, alert, no distress, obese, well-groomed  Skin: Skin color, texture, turgor normal. No rashes or lesions.  Lungs: negative findings: normal respiratory rate and rhythm, lungs clear to auscultation  Heart: negative. RRR without murmur, gallop, or rubs.  No ectopy.  Abdomen: Abdomen soft, non-tender. BS " normal. No masses,  No organomegaly  Musculoskeletal: positive findings: Decreased range of motion to the left knee due to pain  Neurologic: intact, oriented, mood appropriate, judgment intact. Cranial nerves II-12 grossly intact    Medical decision making/discussion: Patient here to establish care with PCP and discuss his acute and chronic conditions. We did review labs, he will be due for labs again in 6 months, these have been ordered. He is to continue all his other medications as prescribed. He is to continue care per his other specialists as well. He will be due for flu shot in the fall.    Jamie was seen today for establish care.    Diagnoses and all orders for this visit:    Mixed hyperlipidemia  -     LIPID PROFILE; Future    Essential hypertension, benign  -     COMP METABOLIC PANEL; Future  -     LIPID PROFILE; Future    Obesity (BMI 35.0-39.9 without comorbidity) (HCC)    Pain in lateral portion of left knee    Primary osteoarthritis involving multiple joints    Coronary artery disease involving native coronary artery of native heart with angina pectoris (CMS-HCC)    Pulmonary emphysema, unspecified emphysema type (CMS-HCC)    Elevated fasting glucose  -     HEMOGLOBIN A1C; Future          Please note that this dictation was created using voice recognition software. I have made every reasonable attempt to correct obvious errors, but I expect that there are errors of grammar and possibly content that I did not discover before finalizing the note.

## 2017-04-28 NOTE — ASSESSMENT & PLAN NOTE
Patient has history of elevated fasting glucose, his most recent hemoglobin A1c was 6.2%. He is going to continue working on lifestyle modifications including healthy diet and continued efforts towards weight loss. It is difficult for him at this time to increase his physical activity due to left knee pain. He has upcoming left knee replacement surgery scheduled for May 5. He plans on being able to really work on weight loss once he is recovered from the surgery.

## 2017-04-28 NOTE — ASSESSMENT & PLAN NOTE
This is a chronic condition, stable and well-controlled on current medications including albuterol and Atrovent. He tolerates his medications well with no significant bothersome side effects. He does not need refills at this time, but can call when needed. We will monitor this at least every 6 months and as needed.

## 2017-04-28 NOTE — ASSESSMENT & PLAN NOTE
This is a chronic condition, currently stable and he is followed by cardiology. In fact he just had an appointment for clearance for upcoming knee surgery. He tolerates all of his medications well with no significant bothersome side effects.

## 2017-05-05 ENCOUNTER — HOSPITAL ENCOUNTER (INPATIENT)
Facility: MEDICAL CENTER | Age: 72
LOS: 1 days | DRG: 470 | End: 2017-05-06
Attending: ORTHOPAEDIC SURGERY | Admitting: ORTHOPAEDIC SURGERY
Payer: MEDICARE

## 2017-05-05 ENCOUNTER — APPOINTMENT (OUTPATIENT)
Dept: RADIOLOGY | Facility: MEDICAL CENTER | Age: 72
DRG: 470 | End: 2017-05-05
Attending: ANESTHESIOLOGY
Payer: MEDICARE

## 2017-05-05 PROBLEM — M17.12 DEGENERATIVE ARTHRITIS OF LEFT KNEE: Status: ACTIVE | Noted: 2017-05-05

## 2017-05-05 PROCEDURE — 0SRD0J9 REPLACEMENT OF LEFT KNEE JOINT WITH SYNTHETIC SUBSTITUTE, CEMENTED, OPEN APPROACH: ICD-10-PCS | Performed by: ORTHOPAEDIC SURGERY

## 2017-05-05 PROCEDURE — A9270 NON-COVERED ITEM OR SERVICE: HCPCS | Performed by: ORTHOPAEDIC SURGERY

## 2017-05-05 PROCEDURE — 160048 HCHG OR STATISTICAL LEVEL 1-5: Performed by: ORTHOPAEDIC SURGERY

## 2017-05-05 PROCEDURE — 700112 HCHG RX REV CODE 229: Performed by: ORTHOPAEDIC SURGERY

## 2017-05-05 PROCEDURE — 73560 X-RAY EXAM OF KNEE 1 OR 2: CPT | Mod: LT

## 2017-05-05 PROCEDURE — 500097 HCHG BONE CEMENT, SIMPLEX FULL DOSE: Performed by: ORTHOPAEDIC SURGERY

## 2017-05-05 PROCEDURE — 700111 HCHG RX REV CODE 636 W/ 250 OVERRIDE (IP): Performed by: ORTHOPAEDIC SURGERY

## 2017-05-05 PROCEDURE — 94760 N-INVAS EAR/PLS OXIMETRY 1: CPT

## 2017-05-05 PROCEDURE — 501838 HCHG SUTURE GENERAL: Performed by: ORTHOPAEDIC SURGERY

## 2017-05-05 PROCEDURE — 502579 HCHG PACK, TOTAL KNEE: Performed by: ORTHOPAEDIC SURGERY

## 2017-05-05 PROCEDURE — 160036 HCHG PACU - EA ADDL 30 MINS PHASE I: Performed by: ORTHOPAEDIC SURGERY

## 2017-05-05 PROCEDURE — 502240 HCHG MISC OR SUPPLY RC 0272: Performed by: ORTHOPAEDIC SURGERY

## 2017-05-05 PROCEDURE — 502000 HCHG MISC OR IMPLANTS RC 0278: Performed by: ORTHOPAEDIC SURGERY

## 2017-05-05 PROCEDURE — 500864 HCHG NEEDLE, SPINAL 18G: Performed by: ORTHOPAEDIC SURGERY

## 2017-05-05 PROCEDURE — 160009 HCHG ANES TIME/MIN: Performed by: ORTHOPAEDIC SURGERY

## 2017-05-05 PROCEDURE — 700101 HCHG RX REV CODE 250

## 2017-05-05 PROCEDURE — 700111 HCHG RX REV CODE 636 W/ 250 OVERRIDE (IP)

## 2017-05-05 PROCEDURE — 700101 HCHG RX REV CODE 250: Performed by: ORTHOPAEDIC SURGERY

## 2017-05-05 PROCEDURE — 160041 HCHG SURGERY MINUTES - EA ADDL 1 MIN LEVEL 4: Performed by: ORTHOPAEDIC SURGERY

## 2017-05-05 PROCEDURE — 770006 HCHG ROOM/CARE - MED/SURG/GYN SEMI*

## 2017-05-05 PROCEDURE — 500088 HCHG BLADE, SAGITTAL: Performed by: ORTHOPAEDIC SURGERY

## 2017-05-05 PROCEDURE — 700105 HCHG RX REV CODE 258: Performed by: ORTHOPAEDIC SURGERY

## 2017-05-05 PROCEDURE — 700102 HCHG RX REV CODE 250 W/ 637 OVERRIDE(OP): Performed by: ORTHOPAEDIC SURGERY

## 2017-05-05 PROCEDURE — 160035 HCHG PACU - 1ST 60 MINS PHASE I: Performed by: ORTHOPAEDIC SURGERY

## 2017-05-05 PROCEDURE — 160002 HCHG RECOVERY MINUTES (STAT): Performed by: ORTHOPAEDIC SURGERY

## 2017-05-05 PROCEDURE — 500811 HCHG LENS/HOOD FOR SPACESUIT: Performed by: ORTHOPAEDIC SURGERY

## 2017-05-05 PROCEDURE — 500093 HCHG BONE CEMENT MIXER: Performed by: ORTHOPAEDIC SURGERY

## 2017-05-05 PROCEDURE — 501480 HCHG STOCKINETTE: Performed by: ORTHOPAEDIC SURGERY

## 2017-05-05 PROCEDURE — 160029 HCHG SURGERY MINUTES - 1ST 30 MINS LEVEL 4: Performed by: ORTHOPAEDIC SURGERY

## 2017-05-05 PROCEDURE — 501486 HCHG STRYKER IRRIG SET HC W/TUBING: Performed by: ORTHOPAEDIC SURGERY

## 2017-05-05 PROCEDURE — 501487 HCHG STRYKER TIP: Performed by: ORTHOPAEDIC SURGERY

## 2017-05-05 DEVICE — IMPLANTABLE DEVICE: Type: IMPLANTABLE DEVICE | Status: FUNCTIONAL

## 2017-05-05 DEVICE — BONE CEMENT SIMPLEX FULL DOSE - (10EA/PK): Type: IMPLANTABLE DEVICE | Status: FUNCTIONAL

## 2017-05-05 RX ORDER — TRANEXAMIC ACID 100 MG/ML
INJECTION, SOLUTION INTRAVENOUS
Status: DISPENSED
Start: 2017-05-05 | End: 2017-05-05

## 2017-05-05 RX ORDER — AMOXICILLIN 250 MG
1 CAPSULE ORAL NIGHTLY
Status: DISCONTINUED | OUTPATIENT
Start: 2017-05-05 | End: 2017-05-06 | Stop reason: HOSPADM

## 2017-05-05 RX ORDER — MORPHINE SULFATE 10 MG/ML
INJECTION, SOLUTION INTRAMUSCULAR; INTRAVENOUS
Status: DISCONTINUED | OUTPATIENT
Start: 2017-05-05 | End: 2017-05-05 | Stop reason: HOSPADM

## 2017-05-05 RX ORDER — ACETAMINOPHEN 500 MG
1000 TABLET ORAL EVERY 6 HOURS
Status: DISCONTINUED | OUTPATIENT
Start: 2017-05-05 | End: 2017-05-06 | Stop reason: HOSPADM

## 2017-05-05 RX ORDER — HALOPERIDOL 5 MG/ML
INJECTION INTRAMUSCULAR
Status: COMPLETED
Start: 2017-05-05 | End: 2017-05-05

## 2017-05-05 RX ORDER — OXYCODONE HYDROCHLORIDE 10 MG/1
10 TABLET ORAL
Status: DISCONTINUED | OUTPATIENT
Start: 2017-05-05 | End: 2017-05-06 | Stop reason: HOSPADM

## 2017-05-05 RX ORDER — DEXTROSE MONOHYDRATE, SODIUM CHLORIDE, AND POTASSIUM CHLORIDE 50; 1.49; 4.5 G/1000ML; G/1000ML; G/1000ML
INJECTION, SOLUTION INTRAVENOUS CONTINUOUS
Status: DISCONTINUED | OUTPATIENT
Start: 2017-05-05 | End: 2017-05-06 | Stop reason: HOSPADM

## 2017-05-05 RX ORDER — MORPHINE SULFATE 4 MG/ML
4 INJECTION, SOLUTION INTRAMUSCULAR; INTRAVENOUS
Status: DISCONTINUED | OUTPATIENT
Start: 2017-05-05 | End: 2017-05-06 | Stop reason: HOSPADM

## 2017-05-05 RX ORDER — KETOROLAC TROMETHAMINE 30 MG/ML
15 INJECTION, SOLUTION INTRAMUSCULAR; INTRAVENOUS EVERY 6 HOURS
Status: DISCONTINUED | OUTPATIENT
Start: 2017-05-05 | End: 2017-05-06 | Stop reason: HOSPADM

## 2017-05-05 RX ORDER — DEXAMETHASONE SODIUM PHOSPHATE 4 MG/ML
4 INJECTION, SOLUTION INTRA-ARTICULAR; INTRALESIONAL; INTRAMUSCULAR; INTRAVENOUS; SOFT TISSUE
Status: DISCONTINUED | OUTPATIENT
Start: 2017-05-05 | End: 2017-05-06 | Stop reason: HOSPADM

## 2017-05-05 RX ORDER — BACITRACIN 50000 [IU]/1
INJECTION, POWDER, FOR SOLUTION INTRAMUSCULAR
Status: DISCONTINUED | OUTPATIENT
Start: 2017-05-05 | End: 2017-05-05 | Stop reason: HOSPADM

## 2017-05-05 RX ORDER — OXYCODONE HYDROCHLORIDE 5 MG/1
5 TABLET ORAL
Status: DISCONTINUED | OUTPATIENT
Start: 2017-05-05 | End: 2017-05-06 | Stop reason: HOSPADM

## 2017-05-05 RX ORDER — AMOXICILLIN 250 MG
1 CAPSULE ORAL
Status: DISCONTINUED | OUTPATIENT
Start: 2017-05-05 | End: 2017-05-06 | Stop reason: HOSPADM

## 2017-05-05 RX ORDER — SODIUM CHLORIDE, SODIUM LACTATE, POTASSIUM CHLORIDE, CALCIUM CHLORIDE 600; 310; 30; 20 MG/100ML; MG/100ML; MG/100ML; MG/100ML
INJECTION, SOLUTION INTRAVENOUS
Status: DISCONTINUED | OUTPATIENT
Start: 2017-05-05 | End: 2017-05-06 | Stop reason: HOSPADM

## 2017-05-05 RX ORDER — LIDOCAINE HYDROCHLORIDE 10 MG/ML
INJECTION, SOLUTION INFILTRATION; PERINEURAL
Status: COMPLETED
Start: 2017-05-05 | End: 2017-05-05

## 2017-05-05 RX ORDER — ENEMA 19; 7 G/133ML; G/133ML
1 ENEMA RECTAL
Status: DISCONTINUED | OUTPATIENT
Start: 2017-05-05 | End: 2017-05-06 | Stop reason: HOSPADM

## 2017-05-05 RX ORDER — ONDANSETRON 2 MG/ML
4 INJECTION INTRAMUSCULAR; INTRAVENOUS EVERY 4 HOURS PRN
Status: DISCONTINUED | OUTPATIENT
Start: 2017-05-05 | End: 2017-05-06 | Stop reason: HOSPADM

## 2017-05-05 RX ORDER — ROPIVACAINE HYDROCHLORIDE 5 MG/ML
INJECTION, SOLUTION EPIDURAL; INFILTRATION; PERINEURAL
Status: DISCONTINUED | OUTPATIENT
Start: 2017-05-05 | End: 2017-05-05 | Stop reason: HOSPADM

## 2017-05-05 RX ORDER — POLYETHYLENE GLYCOL 3350 17 G/17G
1 POWDER, FOR SOLUTION ORAL 2 TIMES DAILY PRN
Status: DISCONTINUED | OUTPATIENT
Start: 2017-05-05 | End: 2017-05-06 | Stop reason: HOSPADM

## 2017-05-05 RX ORDER — DIPHENHYDRAMINE HYDROCHLORIDE 50 MG/ML
25 INJECTION INTRAMUSCULAR; INTRAVENOUS EVERY 6 HOURS PRN
Status: DISCONTINUED | OUTPATIENT
Start: 2017-05-05 | End: 2017-05-06 | Stop reason: HOSPADM

## 2017-05-05 RX ORDER — DOCUSATE SODIUM 100 MG/1
100 CAPSULE, LIQUID FILLED ORAL 2 TIMES DAILY
Status: DISCONTINUED | OUTPATIENT
Start: 2017-05-05 | End: 2017-05-06 | Stop reason: HOSPADM

## 2017-05-05 RX ORDER — BISACODYL 10 MG
10 SUPPOSITORY, RECTAL RECTAL
Status: DISCONTINUED | OUTPATIENT
Start: 2017-05-05 | End: 2017-05-06 | Stop reason: HOSPADM

## 2017-05-05 RX ORDER — HALOPERIDOL 5 MG/ML
1 INJECTION INTRAMUSCULAR EVERY 6 HOURS PRN
Status: DISCONTINUED | OUTPATIENT
Start: 2017-05-05 | End: 2017-05-06 | Stop reason: HOSPADM

## 2017-05-05 RX ORDER — KETOROLAC TROMETHAMINE 30 MG/ML
INJECTION, SOLUTION INTRAMUSCULAR; INTRAVENOUS
Status: DISCONTINUED | OUTPATIENT
Start: 2017-05-05 | End: 2017-05-05 | Stop reason: HOSPADM

## 2017-05-05 RX ORDER — SCOLOPAMINE TRANSDERMAL SYSTEM 1 MG/1
1 PATCH, EXTENDED RELEASE TRANSDERMAL
Status: DISCONTINUED | OUTPATIENT
Start: 2017-05-05 | End: 2017-05-06 | Stop reason: HOSPADM

## 2017-05-05 RX ADMIN — DOCUSATE SODIUM 100 MG: 100 CAPSULE, LIQUID FILLED ORAL at 20:57

## 2017-05-05 RX ADMIN — HALOPERIDOL LACTATE 1 MG: 5 INJECTION, SOLUTION INTRAMUSCULAR at 13:12

## 2017-05-05 RX ADMIN — POTASSIUM CHLORIDE, DEXTROSE MONOHYDRATE AND SODIUM CHLORIDE: 150; 5; 450 INJECTION, SOLUTION INTRAVENOUS at 14:16

## 2017-05-05 RX ADMIN — LIDOCAINE HYDROCHLORIDE 0.2 ML: 10 INJECTION, SOLUTION INFILTRATION; PERINEURAL at 10:10

## 2017-05-05 RX ADMIN — VANCOMYCIN HYDROCHLORIDE 1.5 G: 1 INJECTION, POWDER, LYOPHILIZED, FOR SOLUTION INTRAVENOUS at 10:12

## 2017-05-05 RX ADMIN — POTASSIUM CHLORIDE, DEXTROSE MONOHYDRATE AND SODIUM CHLORIDE: 150; 5; 450 INJECTION, SOLUTION INTRAVENOUS at 23:04

## 2017-05-05 RX ADMIN — KETOROLAC TROMETHAMINE 15 MG: 30 INJECTION, SOLUTION INTRAMUSCULAR at 18:34

## 2017-05-05 RX ADMIN — ACETAMINOPHEN 1000 MG: 500 TABLET ORAL at 18:34

## 2017-05-05 RX ADMIN — FENTANYL CITRATE 50 MCG: 50 INJECTION, SOLUTION INTRAMUSCULAR; INTRAVENOUS at 13:48

## 2017-05-05 RX ADMIN — SENNOSIDES AND DOCUSATE SODIUM 1 TABLET: 8.6; 5 TABLET ORAL at 20:58

## 2017-05-05 RX ADMIN — HALOPERIDOL LACTATE 1 MG: 5 INJECTION, SOLUTION INTRAMUSCULAR at 13:30

## 2017-05-05 RX ADMIN — OXYCODONE HYDROCHLORIDE 5 MG: 5 TABLET ORAL at 20:58

## 2017-05-05 RX ADMIN — VANCOMYCIN HYDROCHLORIDE 1500 MG: 10 INJECTION, POWDER, LYOPHILIZED, FOR SOLUTION INTRAVENOUS at 23:03

## 2017-05-05 RX ADMIN — SODIUM CHLORIDE, SODIUM LACTATE, POTASSIUM CHLORIDE, CALCIUM CHLORIDE: 600; 310; 30; 20 INJECTION, SOLUTION INTRAVENOUS at 10:12

## 2017-05-05 ASSESSMENT — LIFESTYLE VARIABLES
CONSUMPTION TOTAL: NEGATIVE
EVER HAD A DRINK FIRST THING IN THE MORNING TO STEADY YOUR NERVES TO GET RID OF A HANGOVER: NO
ON A TYPICAL DAY WHEN YOU DRINK ALCOHOL HOW MANY DRINKS DO YOU HAVE: 0
TOTAL SCORE: 0
EVER_SMOKED: YES
AVERAGE NUMBER OF DAYS PER WEEK YOU HAVE A DRINK CONTAINING ALCOHOL: 0
EVER FELT BAD OR GUILTY ABOUT YOUR DRINKING: NO
HAVE YOU EVER FELT YOU SHOULD CUT DOWN ON YOUR DRINKING: NO
EVER_SMOKED: YES
HOW MANY TIMES IN THE PAST YEAR HAVE YOU HAD 5 OR MORE DRINKS IN A DAY: 0
HAVE PEOPLE ANNOYED YOU BY CRITICIZING YOUR DRINKING: NO
TOTAL SCORE: 0
ALCOHOL_USE: YES
TOTAL SCORE: 0

## 2017-05-05 ASSESSMENT — PAIN SCALES - GENERAL
PAINLEVEL_OUTOF10: 1
PAINLEVEL_OUTOF10: 3
PAINLEVEL_OUTOF10: 0
PAINLEVEL_OUTOF10: 6
PAINLEVEL_OUTOF10: 0
PAINLEVEL_OUTOF10: 5
PAINLEVEL_OUTOF10: 0
PAINLEVEL_OUTOF10: 7
PAINLEVEL_OUTOF10: 0

## 2017-05-05 NOTE — OR NURSING
Polar care in place. SCDs on and functioning. Xray completed. Pt medicated for pain based on orders from Dr. Barnard. Pt resting comfortably at this time.

## 2017-05-05 NOTE — OR NURSING
Pt arrived from OR with RN and Anesthesiologist present. LMA removed prior to entrance to PACU. Pt provided with 3L supplemental oxygen delivered via NC. Lungs clear to ausculation bilaterally with RR = 14 and saturation of 95%. NSR observed on monitor. Dressing in place; CDI. Toes warm, pink, 2+ pedal pulse. Pt able to move toe on L feet and reports feeling nurse touch foot. Pt lethargic, answering questions appropriately. IV patent.

## 2017-05-05 NOTE — IP AVS SNAPSHOT
5/6/2017    Jamie Pacheco  [address] [place] [address]    Dear Jamie:    [facility] wants to ensure your discharge home is safe and you or your loved ones have had all of your questions answered regarding your care after you leave the hospital.    Below is a list of resources and contact information should you have any questions regarding your hospital stay, follow-up instructions, or active medical symptoms.    Questions or Concerns Regarding… Contact   Medical Questions Related to Your Discharge  (7 days a week, 8am-5pm) Contact a Nurse Care Coordinator   [phone]   Medical Questions Not Related to Your Discharge  (24 hours a day / 7 days a week)  Contact the Nurse Health Line   [phone]    Medications or Discharge Instructions Refer to your discharge packet   or contact your [facility] Primary Care Provider   [phone]   Follow-up Appointment(s) Schedule your appointment via Raiing   or contact Scheduling [phone]   Billing Review your statement via Raiing  or contact Billing [phone]   Medical Records Review your records via Raiing   or contact Medical Records [phone]     You may receive a telephone call within two days of discharge. This call is to make certain you understand your discharge instructions and have the opportunity to have any questions answered. You can also easily access your medical information, test results and upcoming appointments via the Raiing free online health management tool. You can learn more and sign up at [employer]/Raiing. For assistance setting up your Raiing account, please call [phone].    Once again, we want to ensure your discharge home is safe and that you have a clear understanding of any next steps in your care. If you have any questions or concerns, please do not hesitate to contact us, we are here for you. Thank you for choosing [facility] for your healthcare needs.    Sincerely,    Your [facility] Healthcare Team

## 2017-05-05 NOTE — IP AVS SNAPSHOT
Tresata Access Code: Activation code not generated  Current Tresata Status: Active    MedEncentivehart  A secure, online tool to manage your health information     [employer]’s Tresata® is a secure, online tool that connects you to your personalized health information from the privacy of your home -- day or night - making it very easy for you to manage your healthcare. Once the activation process is completed, you can even access your medical information using the Tresata breanna, which is available for free in the Apple Breanna store or Google Play store.     Tresata provides the following levels of access (as shown below):   My Chart Features   [facility] Primary Care Doctor [facility]  Specialists [facility]  Urgent  Care Non-[facility]  Primary Care  Doctor   Email your healthcare team securely and privately 24/7 X X X X   Manage appointments: schedule your next appointment; view details of past/upcoming appointments X      Request prescription refills. X      View recent personal medical records, including lab and immunizations X X X X   View health record, including health history, allergies, medications X X X X   Read reports about your outpatient visits, procedures, consult and ER notes X X X X   See your discharge summary, which is a recap of your hospital and/or ER visit that includes your diagnosis, lab results, and care plan. X X       How to register for Tresata:  1. Go to  [email or web address].  2. Click on the Sign Up Now box, which takes you to the New Member Sign Up page. You will need to provide the following information:  a. Enter your Tresata Access Code exactly as it appears at the top of this page. (You will not need to use this code after you’ve completed the sign-up process. If you do not sign up before the expiration date, you must request a new code.)   b. Enter your date of birth.   c. Enter your home email address.   d. Click Submit, and follow the next screen’s instructions.  3. Create a Tresata ID. This will  be your [employer] login ID and cannot be changed, so think of one that is secure and easy to remember.  4. Create a [employer] password. You can change your password at any time.  5. Enter your Password Reset Question and Answer. This can be used at a later time if you forget your password.   6. Enter your e-mail address. This allows you to receive e-mail notifications when new information is available in [employer].  7. Click Sign Up. You can now view your health information.    For assistance activating your Histogen account, call [phone]

## 2017-05-05 NOTE — OP REPORT
DATE OF OPERATION: 5/5/2017    PREOPERATIVE DIAGNOSIS: Osteoarthritis, left knee.   POSTOPERATIVE DIAGNOSIS: Osteoarthritis, left knee.     PROCEDURE: Left total knee arthroplasty.     SURGEON: Ermias Griffith MD.   ASSISTANT: Alvaro Dent   ANESTHESIOLOGIST: Dr Barnard    ANESTHESIA: General with intraoperative local block.     FINDINGS:   1. 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       PROCEDURE IN DETAIL: The patient was taken to the operating room where he   underwent general anesthetic supine position.  Left lower extremity isolated,   prepped using Hibiclens, alcohol ChloraPrep, draped using sterile technique.   An appropriate timeout was undertaken. The patient's left lower extremity was   addressed. The body-exhaust uniforms and perioperative antibiotics were   given. Air isolation draping was utilized.    Surgery was initiated with a minimally invasive quadriceps snip approach.   Skin and subcutaneous tissue were incised. Hemostasis obtained. The   Heron's layer was dissected separately. The medial parapatellar quad snip   was undertaken. The soft tissues were elevated off the proximal medial tibia.   The ACL was excised as was the medial and lateral meniscus. The LCL, PCL and   MCL were all maintained. The patella was resected freehand and trialed. The periarticular tissues were anesthetized with ropivacaine and Toradol.    The intramedullary guide system was utilized on the femur and tibia and the appropriate cuts were made. The posterior femoral condyles were resected   using a curved osteotome. The pulse lavage was copiously irrigated. The  implants were placed with the tibial component placed first followed by the femoral followed by the final polyethylene and excess cement removal.     Pulse lavage was undertaken and the  patella dried. The patella was placed with the  cement. The tourniquet was let down, hemostasis was obtained and then pulse   lavage repeated.  The closure was undertaken after obtaining hemostasis. Interrupted #2 Tycron were utilized proximally and then #1 Quil suture was utilized to close the deep layer.  Marshalls layer was closed and pulse lavage was repeated. Subcutaneous closure with 0  and 2-0 Monocryl were completed. Staples were utilized on the skin. The patient placed into a sterile bandage, awoken from his anesthetic and taken to the recovery room. The patient tolerated the procedure very well with no signs of complications.

## 2017-05-05 NOTE — IP AVS SNAPSHOT
" Home Care Instructions                                                                                                                  Name:Jamie Pacheco  Medical Record Number:[id number]  CSN: [id number]    YOB: 1945   Age: 71 y.o.  Sex: male  HT:1.676 m (5' 5.98\") WT: 100.7 kg (222 lb 0.1 oz)          Admit Date: 5/5/2017     Discharge Date:   Today's Date: 5/6/2017  Attending Doctor:  Ermias Griffith M.D.                  Allergies:  Ampicillin; Clindamycin; and Demerol            Discharge Instructions       Weight bearing as tolerated with crutches. Leave dressing on for 5 days. Lovenox for 2 weeks.    Discharge Instructions    Discharged to home by car with relative. Discharged via wheelchair, hospital escort: Yes.  Special equipment needed: Crutches    Be sure to schedule a follow-up appointment with your primary care doctor or any specialists as instructed.     Discharge Plan:   Influenza Vaccine Indication: Patient Refuses    I understand that a diet low in cholesterol, fat, and sodium is recommended for good health. Unless I have been given specific instructions below for another diet, I accept this instruction as my diet prescription.   Other diet: n/a    Special Instructions: Discharge instructions for the Orthopedic Patient    Follow up with Primary Care Physician within 2 weeks of discharge to home, regarding:  Review of medications and diagnostic testing.  Surveillance for medical complications.  Workup and treatment of osteoporosis, if appropriate.     -Is this a Joint Replacement patient? Yes Total Joint Knee Replacement Discharge Instructions    Pain  - The goal is to slowly wean off the prescription pain medicine.  - Ice can be used for pain control.  20 minutes at a time is recommended, and never directly against your skin or incision.  - Most patients are off the pain pills by 3 weeks; others may require a low level of pain medications for many months.  If your pain continues to be " severe, follow up with your physician.  Infection    Knee joint infections; occur in fewer than 2% of patients. The most common causes of infection following total knee replacement surgery are from bacteria that enter the bloodstream during dental procedures, urinary tract infections, or skin infections. These bacteria can lodge around your knee replacement and cause an infection.  - Keep the incision as clean and dry as possible.  - Always wash your hands before touching your incision.  - Skin infections tend to develop around 7-10 days after surgery; most can be treated with oral antibiotics.  - Dental Care should be delayed for 3 months after surgery, your surgeon recommends taking a dose of antibiotics 1 hour prior to any dental procedure. After 2 years, most surgeons recommend antibiotics only before an extensive procedure.  Ask your surgeon what he recommends.  - Signs and symptoms of infection can include:  low grade fever, redness, pain, swelling and drainage from your incision.  Notify your surgeon immediately if you develop any of these symptoms.  Other instructions  - Bowel habits - constipation is extremely common and is caused by a combination of anesthesia, lack of mobility and pain medicine.  Use stool softeners or laxatives if necessary. It is important not to ignore this problem, as bowel obstructions can be a serious complication after joint replacement surgery.  - Mood/Energy Level - Many patients experience a lack of energy and endurance for up to 2-3 months after surgery.  Some may also feel down and can even become depressed.  This is likely due to the postoperative anemia, change in activity level, lack of sleep, pain medicine and just the emotional reaction to the surgery itself that is a big disruption in a person’s life.  This usually passes.  If symptoms persist, follow up with your primary physician.  - Returning to work - Your surgeon will give you more specific instructions. Depending  on the type of activities you perform, it may be 6 to 8 weeks before you return to work.   Generally, if you work a sedentary job requiring little standing or walking, most patients may return within 2-6 weeks.  Manual labor jobs involving walking, lifting and standing may take longer. Your surgeon’s office can provide a release to part-time or light duty work early on in your recovery and progress you to full duty as able.    - Driving - If your left knee was replaced and you have an automatic transmission, you may be able to begin driving in a week or so, provided you are no longer taking narcotic pain medication. If your right knee was replaced, avoid driving for 6 to 8 weeks. Remember that your reflexes may not be as sharp as before your surgery. Ask your surgeon for specific instructions.   - Avoiding falls - A fall during the first few weeks after surgery can damage your new knee and may result in a need for further surgery.   throw rugs and tack down loose carpeting.  Be aware of floor hazards such as pets, small objects or uneven surfaces.    - [place] Metal Detectors - The sensitivity of metal detectors varies and it is likely that your prosthesis will cause an alarm.  Inform the  of your artificial joint.  Diet  - Resume your normal diet as tolerated.  - It is important to achieve a healthy nutritional status by eating a well balanced diet on a regular basis.  - Your physician may recommend that you take iron and vitamin supplements.   - Continue to drink plenty of fluids.  Shower/Bathing  - You may shower as soon as you get home from the hospital unless otherwise instructed.  - Keep your incision out of water.  To keep the incision dry when showering, cover it with a plastic bag or plastic wrap.  - Pat incision dry if it gets wet.  Don’t rub.  - Do not submerge in a bath until staples are out and the incision is completely healed. (Approximately 6-8 weeks)  Dressing Change:   Procedure (if recommended by your physician)  - Wash hands.  - Open all new dressing change materials.  - Remove old dressing and discard.  - Inspect incision for redness, increase in clear drainage, yellow/green drainage, odor and surrounding skin hot to touch.  -  ABD (large gauze) pad or “island dressing” by one corner and lay over the incision.  Be careful not to touch the inside of the dressing that will lay over the incision.  - Secure in place as instructed (Ace wrap or tape).    Swelling/Bruising    - Swelling can last from 3-6 months.  - Elevate your leg higher than your heart while reclining.   The first week you are home you should elevate your leg an equal amount of time, as you are active.    - Anti-inflammatory pills can be taken once you have stopped the blood thinners.  - The swelling is usually worse after you go home since you are upright for longer periods of time.  - Bruising is common and can involve the entire leg including the thigh, calf and even foot. Bruising often does not appear until after you arrive home and it can be quite dramatic- purple, black, and green.  The bruising you can see is not usually concerning and will subside without any treatment.      Blood Clot Prevention  Blood clots in the legs and the less common, but frightening, clots that travel to the lungs are a real focus of our preventative. Most patients are at standard risk for them, but those patients who are at higher risk include people who have had previous clots, a family history of clotting, smoking, diabetes, obesity, advanced age, use of estrogen and a sedentary lifestyle.    - Signs of blood clots in legs - Swelling in thigh, calf or ankle that does not go down with elevation.  Pain, heat and tenderness in calf, back of calf or groin area.  NOTE: blood clots can occur in either leg.  - You have been receiving anticoagulant therapy (blood thinners) in the hospital and you may be instructed to continue at  home depending on your risk factors.  - Your risk for developing a clot continues for up to 2-3 months after surgery.  You should avoid prolonged sitting and dehydration during that time (long air trips and car trips).  If you do take a trip during this time, please get up and move around every 1- 1.5 hours.  - If you are prescribed blood-thinning medication for home, follow instructions as directed. (Handouts provided if applicable).      Activity  Once home, you should continue to stay active. The key is to remember not to overdo it! While you can expect some good days and some bad days, you should notice a gradual improvement and a gradual increase in your endurance over the next 6 to 12 months. Exercise is a critical component of home care, particularly during the first few weeks after surgery.     - Normal activities of daily living You should be able to resume most within 3 to 6 weeks following surgery. Some pain with activity and at night is common for several weeks after surgery  -  Physical Therapy Exercises - Continue to do the exercises prescribed for at least two months after surgery. Riding a stationary bicycle can help maintain muscle tone and keep your knee flexible. Try to achieve the maximum degree of bending and extension possible. (handout provided by Therapist).  - Sexual Activity -. Your surgeon can tell you when it safe to resume sexual activity.    - Sleeping Positions - You can safely sleep on your back, on either side, or on your stomach.   - Other Activities - Walk as much as you like, but remember that walking is no substitute for the exercises your doctor and physical therapist will prescribe. Lower impact activities are preferred.  If you have specific questions, consult your Surgeon.    When to Call the Doctor   Call the physician if:   - Fever over 100.5? F  - Increased pain, drainage, redness, odor or heat around the incision area  - Shaking chills  - Increased knee pain with activity  and rest  - Increased pain in calf, tenderness or redness above or below the knee  - Increased swelling of calf, ankle, foot  - Sudden increased shortness of breath, sudden onset of chest pain, localized chest pain with coughing  - Incision opening  Or, if there are any questions or concerns about medications or care.       -Is this patient being discharged with medication to prevent blood clots?  Yes, Lovenox Enoxaparin injection  What is this medicine?  ENOXAPARIN (ee nox a PA rin) is used after knee, hip, or abdominal surgeries to prevent blood clotting. It is also used to treat existing blood clots in the lungs or in the veins.  This medicine may be used for other purposes; ask your health care provider or pharmacist if you have questions.  COMMON BRAND NAME(S): Lovenox  What should I tell my health care provider before I take this medicine?  They need to know if you have any of these conditions:  -bleeding disorders, hemorrhage, or hemophilia  -infection of the heart or heart valves  -kidney or liver disease  -previous stroke  -prosthetic heart valve  -recent surgery or delivery of a baby  -ulcer in the stomach or intestine, diverticulitis, or other bowel disease  -an unusual or allergic reaction to enoxaparin, heparin, pork or pork products, other medicines, foods, dyes, or preservatives  -pregnant or trying to get pregnant  -breast-feeding  How should I use this medicine?  This medicine is for injection under the skin. It is usually given by a health-care professional. You or a family member may be trained on how to give the injections. If you are to give yourself injections, make sure you understand how to use the syringe, measure the dose if necessary, and give the injection. To avoid bruising, do not rub the site where this medicine has been injected. Do not take your medicine more often than directed. Do not stop taking except on the advice of your doctor or health care professional.  Make sure you  receive a puncture-resistant container to dispose of the needles and syringes once you have finished with them. Do not reuse these items. Return the container to your doctor or health care professional for proper disposal.  Talk to your pediatrician regarding the use of this medicine in children. Special care may be needed.  Overdosage: If you think you have taken too much of this medicine contact a poison control center or emergency room at once.  NOTE: This medicine is only for you. Do not share this medicine with others.  What if I miss a dose?  If you miss a dose, take it as soon as you can. If it is almost time for your next dose, take only that dose. Do not take double or extra doses.  What may interact with this medicine?  Do not take this medicine with any of the following medications:  -aspirin and aspirin-like medicines  -heparin  -mifepristone  -palifermin  -warfarin   This medicine may also interact with the following medications:  -cilostazol  -clopidogrel  -dipyridamole  -NSAIDs, medicines for pain and inflammation, like ibuprofen or naproxen  -sulfinpyrazone  -ticlopidine  This list may not describe all possible interactions. Give your health care provider a list of all the medicines, herbs, non-prescription drugs, or dietary supplements you use. Also tell them if you smoke, drink alcohol, or use illegal drugs. Some items may interact with your medicine.  What should I watch for while using this medicine?  Visit your doctor or health care professional for regular checks on your progress. Your condition will be monitored carefully while you are receiving this medicine.  If you are going to have surgery, tell your doctor or health care professional that you are taking this medicine.  Do not stop taking this medicine without first talking to your doctor. Be sure to refill your prescription before you run out of medicine.  Avoid sports and activities that might cause injury while you are using this  medicine. Severe falls or injuries can cause unseen bleeding. Be careful when using sharp tools or knives. Consider using an electric razor. Take special care brushing or flossing your teeth. Report any injuries, bruising, or red spots on the skin to your doctor or health care professional.  What side effects may I notice from receiving this medicine?  Side effects that you should report to your doctor or health care professional as soon as possible:  -allergic reactions like skin rash, itching or hives, swelling of the face, lips, or tongue  -dark urine  -feeling faint or lightheaded, falls  -fever  -heavy menstrual bleeding  -signs and symptoms of bleeding such as bloody or black, tarry stools; red or dark-brown urine; spitting up blood or brown material that looks like coffee grounds; red spots on the skin; unusual bruising or bleeding from the eye, gums, or nose  -signs and symptoms of a blood clot such as breathing problems; changes in vision; chest pain; severe, sudden headache; pain, swelling, warmth in the leg; trouble speaking; sudden numbness or weakness of the face, arm or leg  Side effects that usually do not require medical attention (report to your doctor or health care professional if they continue or are bothersome):  -pain or irritation at the injection site  This list may not describe all possible side effects. Call your doctor for medical advice about side effects. You may report side effects to FDA at 1-680-FDA-8570.  Where should I keep my medicine?  Keep out of the reach of children.  Store at room temperature between 15 and 30 degrees C (59 and 86 degrees F). Do not freeze. If your injections have been specially prepared, you may need to store them in the refrigerator. Ask your pharmacist. Throw away any unused medicine after the expiration date.  NOTE: This sheet is a summary. It may not cover all possible information. If you have questions about this medicine, talk to your doctor,  pharmacist, or health care provider.  © 2014, [employer]. (3/31/2014 10:04:27 AM)      · Is patient discharged on Warfarin / Coumadin?   No     · Is patient Post Blood Transfusion?  No    Depression / Suicide Risk    As you are discharged from this [facility], it is important to learn how to keep safe from harming yourself.    Recognize the warning signs:  · Abrupt changes in personality, positive or negative- including increase in energy   · Giving away possessions  · Change in eating patterns- significant weight changes-  positive or negative  · Change in sleeping patterns- unable to sleep or sleeping all the time   · Unwillingness or inability to communicate  · Depression  · Unusual sadness, discouragement and loneliness  · Talk of wanting to die  · Neglect of personal appearance   · Rebelliousness- reckless behavior  · Withdrawal from people/activities they love  · Confusion- inability to concentrate     If you or a loved one observes any of these behaviors or has concerns about self-harm, here's what you can do:  · Talk about it- your feelings and reasons for harming yourself  · Remove any means that you might use to hurt yourself (examples: pills, rope, extension cords, firearm)  · Get professional help from the community (Mental Health, Substance Abuse, psychological counseling)  · Do not be alone:Call your Safe Contact- someone whom you trust who will be there for you.  · Call your local CRISIS HOTLINE 824-3165 or 217-170-9005  · Call your local Children's Mobile Crisis Response Team [place] (817) 307-7707 or www.[employer]  · Call the toll free National Suicide Prevention Hotlines   · National Suicide Prevention Lifeline 125-637-DVNK (6104)  · National Hope Line Network 800-SUICIDE (928-1476)        Your appointments     Nov 03, 2017  8:00 AM   Established Patient with DONNA Jarquin   [facility] [place] [place]    [address]  [place] [id number] [phone]           You will be receiving a confirmation call a few days before your appointment from our automated call confirmation system.                 Discharge Medication Instructions:    Below are the medications your physician expects you to take upon discharge:    Review all your home medications and newly ordered medications with your doctor and/or pharmacist. Follow medication instructions as directed by your doctor and/or pharmacist.    Please keep your medication list with you and share with your physician.               Medication List      CHANGE how you take these medications        Instructions    Morning Afternoon Evening Bedtime    atorvastatin 40 MG Tabs   What changed:  how much to take   Commonly known as:  LIPITOR        Take 1 Tab by mouth every bedtime.   Dose:  40 mg                        ipratropium 0.02 % Soln   What changed:    - when to take this  - reasons to take this   Commonly known as:  ATROVENT        1 mL by Nebulization route 2 times a day.   Dose:  0.2 mg                        ipratropium 17 MCG/ACT Aers   What changed:    - when to take this  - reasons to take this   Commonly known as:  ATROVENT        Inhale 2 Puffs by mouth every 6 hours.   Dose:  2 Puff                        triamterene/hctz 37.5-25 MG Caps   What changed:  when to take this   Commonly known as:  MAXZIDE-25/DYAZIDE        Take 2 Caps by mouth 2 times a day.   Dose:  2 Cap                          CONTINUE taking these medications        Instructions    Morning Afternoon Evening Bedtime    * albuterol 2.5mg/3ml Nebu solution for nebulization   Commonly known as:  PROVENTIL        3 mL by Nebulization route every four hours as needed for Shortness of Breath.   Dose:  2.5 mg                        * albuterol 108 (90 BASE) MCG/ACT Aers inhalation aerosol        Inhale 2 Puffs by mouth every 6 hours as needed for Shortness of Breath.   Dose:  2 Puff                        aspirin 325 MG  Tabs   Commonly known as:  ASA                             etodolac 500 MG tablet   Commonly known as:  LODINE        Take 1 Tab by mouth 2 times a day.   Dose:  500 mg                        GAS RELIEF EXTRA STRENGTH PO        Take  by mouth as needed.                        OCUVITE PO        Take  by mouth every day. WITH ZINC                        PROBIOTIC DAILY PO        Take  by mouth every day.                        vitamin D3 5000 UNITS Caps        Take 1 Cap by mouth every day.   Dose:  1 Cap                        * Notice:  This list has 2 medication(s) that are the same as other medications prescribed for you. Read the directions carefully, and ask your doctor or other care provider to review them with you.            Instructions           Diet / Nutrition:    Follow any diet instructions given to you by your doctor or the dietician, including how much salt (sodium) you are allowed each day.    If you are overweight, talk to your doctor about a weight reduction plan.    Activity:    Remain physically active following your doctor's instructions about exercise and activity.    Rest often.     Any time you become even a little tired or short of breath, SIT DOWN and rest.    Worsening Symptoms:    Report any of the following signs and symptoms to the doctor's office immediately:    *Pain of jaw, arm, or neck  *Chest pain not relieved by medication                               *Dizziness or loss of consciousness  *Difficulty breathing even when at rest   *More tired than usual                                       *Bleeding drainage or swelling of surgical site  *Swelling of feet, ankles, legs or stomach                 *Fever (>100ºF)  *Pink or blood tinged sputum  *Weight gain (3lbs/day or 5lbs /week)           *Shock from internal defibrillator (if applicable)  *Palpitations or irregular heartbeats                *Cool and/or numb extremities    Stroke Awareness    Common Risk Factors for Stroke  include:    Age  Atrial Fibrillation  Carotid Artery Stenosis  Diabetes Mellitus  Excessive alcohol consumption  High blood pressure  Overweight   Physical inactivity  Smoking    Warning signs and symptoms of a stroke include:    *Sudden numbness or weakness of the face, arm or leg (especially on one side of the body).  *Sudden confusion, trouble speaking or understanding.  *Sudden trouble seeing in one or both eyes.  *Sudden trouble walking, dizziness, loss of balance or coordination.Sudden severe headache with no known cause.    It is very important to get treatment quickly when a stroke occurs. If you experience any of the above warning signs, call 911 immediately.                   Disclaimer         Quit Smoking / Tobacco Use:    I understand the use of any tobacco products increases my chance of suffering from future heart disease or stroke and could cause other illnesses which may shorten my life. Quitting the use of tobacco products is the single most important thing I can do to improve my health. For further information on smoking / tobacco cessation call a Toll Free Quit Line at 1-901.336.2411 (*National [employer] [place]) or 1-299.153.8235 (American Lung Association) or you can access the web based program at www.lungusa.org.    [place] Tobacco Users Help Line:  (878) 763-6802       Toll Free: 1-915.918.6235  Quit Tobacco Program [facility] (065)897-8234    Crisis Hotline:    [place] Crisis Hotline:  1-413-KZJMJSX or 1-321.849.4441    [place] Crisis Hotline:    1-798.991.7534 or 153-696-7507    Discharge Survey:   Thank you for choosing [facility]. We hope we did everything we could to make your hospital stay a pleasant one. You may be receiving a phone survey and we would appreciate your time and participation in answering the questions. Your input is very valuable to us in our efforts to improve our service to our patients and their families.        My signature on this form  indicates that:    1. I have reviewed and understand the above information.  2. My questions regarding this information have been answered to my satisfaction.  3. I have formulated a plan with my discharge nurse to obtain my prescribed medications for home.                  Disclaimer         __________________________________                     __________       ________                       Patient Signature                                                 Date                    Time

## 2017-05-05 NOTE — OR NURSING
Report given to Kimmy GSU RN. POC reviewed and all questions answered. Pt transported with CNA and RN to unit. Polar care pad in place. CMS intact. Dressing CDI. Pt arouses to light stimulation. Reporting 6/10 pain level with minimal nausea when awake. Remains on 3L NC. All belongings with patient.

## 2017-05-05 NOTE — OR NURSING
1300 Assumed care of patient; pt arouses to voice and reports R knee pain but eyes close quickly without stimulation and mild snoring noted. Pt denies using CPAP at home. Denies nausea. CMS intact to RLE, dressing CDI to R knee with polar ice placed as ordered. +2 R pedal pulse and pink/warm toes.   1315 Xray here to perform films as ordered. Pt c/o nausea and dry heaves noted with position changes. Plan to give IV Haldol for comfort. Pain rated as tolerable.   1330 Pain rated as 5/10 and not tolerable. Nausea improved but not resolved. Resting quietly on bed.  1340 Report to HAROLDO Monroy

## 2017-05-06 VITALS
HEART RATE: 65 BPM | SYSTOLIC BLOOD PRESSURE: 104 MMHG | WEIGHT: 222 LBS | TEMPERATURE: 97.8 F | HEIGHT: 66 IN | RESPIRATION RATE: 18 BRPM | OXYGEN SATURATION: 92 % | DIASTOLIC BLOOD PRESSURE: 57 MMHG | BODY MASS INDEX: 35.68 KG/M2

## 2017-05-06 PROCEDURE — G8980 MOBILITY D/C STATUS: HCPCS | Mod: CI

## 2017-05-06 PROCEDURE — 700102 HCHG RX REV CODE 250 W/ 637 OVERRIDE(OP): Performed by: ORTHOPAEDIC SURGERY

## 2017-05-06 PROCEDURE — G8987 SELF CARE CURRENT STATUS: HCPCS | Mod: CI

## 2017-05-06 PROCEDURE — A9270 NON-COVERED ITEM OR SERVICE: HCPCS | Performed by: ORTHOPAEDIC SURGERY

## 2017-05-06 PROCEDURE — G8988 SELF CARE GOAL STATUS: HCPCS | Mod: CI

## 2017-05-06 PROCEDURE — 97165 OT EVAL LOW COMPLEX 30 MIN: CPT

## 2017-05-06 PROCEDURE — 97161 PT EVAL LOW COMPLEX 20 MIN: CPT

## 2017-05-06 PROCEDURE — 97535 SELF CARE MNGMENT TRAINING: CPT

## 2017-05-06 PROCEDURE — 700111 HCHG RX REV CODE 636 W/ 250 OVERRIDE (IP): Performed by: ORTHOPAEDIC SURGERY

## 2017-05-06 PROCEDURE — G8978 MOBILITY CURRENT STATUS: HCPCS | Mod: CI

## 2017-05-06 PROCEDURE — G8979 MOBILITY GOAL STATUS: HCPCS | Mod: CI

## 2017-05-06 PROCEDURE — G8989 SELF CARE D/C STATUS: HCPCS | Mod: CI

## 2017-05-06 RX ADMIN — OXYCODONE HYDROCHLORIDE 5 MG: 5 TABLET ORAL at 10:01

## 2017-05-06 RX ADMIN — ACETAMINOPHEN 1000 MG: 500 TABLET ORAL at 05:52

## 2017-05-06 RX ADMIN — KETOROLAC TROMETHAMINE 15 MG: 30 INJECTION, SOLUTION INTRAMUSCULAR at 06:32

## 2017-05-06 RX ADMIN — ACETAMINOPHEN 1000 MG: 500 TABLET ORAL at 00:49

## 2017-05-06 RX ADMIN — KETOROLAC TROMETHAMINE 15 MG: 30 INJECTION, SOLUTION INTRAMUSCULAR at 00:49

## 2017-05-06 RX ADMIN — ENOXAPARIN SODIUM 40 MG: 100 INJECTION SUBCUTANEOUS at 06:32

## 2017-05-06 ASSESSMENT — GAIT ASSESSMENTS
DEVIATION: DECREASED TOE OFF
ASSISTIVE DEVICE: CRUTCHES;FRONT WHEEL WALKER
GAIT LEVEL OF ASSIST: SUPERVISED
DISTANCE (FEET): 150

## 2017-05-06 ASSESSMENT — PAIN SCALES - GENERAL
PAINLEVEL_OUTOF10: 1
PAINLEVEL_OUTOF10: 2

## 2017-05-06 ASSESSMENT — ACTIVITIES OF DAILY LIVING (ADL): TOILETING: INDEPENDENT

## 2017-05-06 NOTE — PROGRESS NOTES
MD in to see pt; d/c orders received. IV dc'd. Pt changed into clothing with assistance. Discharge instructions given; pt and family verbalized understanding and questions answered. Prescriptions with pt. Pt dc'd in w/c with hospital escort at 1140.

## 2017-05-06 NOTE — CARE PLAN
Problem: Venous Thromboembolism (VTW)/Deep Vein Thrombosis (DVT) Prevention:  Goal: Patient will participate in Venous Thrombosis (VTE)/Deep Vein Thrombosis (DVT)Prevention Measures    05/05/17 1600   OTHER   Risk Assessment Score 2   VTE RISK Moderate   Mechanical Prophylaxis SCDs, Sequentials (Intermittent Pneumatic Compression Devices)   Pharmacologic Prophylaxis Used LMWH: Enoxaparin(Lovenox)

## 2017-05-06 NOTE — CARE PLAN
Problem: Infection  Goal: Will remain free from infection  Outcome: PROGRESSING AS EXPECTED  Pt is afebrile at this moment.  Surgical dressing to L knee CDI, no purulent drainage noted.  No signs of infections noted.  Continue to monitor     Problem: Pain Management  Goal: Pain level will decrease to patient’s comfort goal  Outcome: PROGRESSING AS EXPECTED  Pt had local block, currently had no pain.   Encouraged pt to take prn Oxy 5mg along with scheduled Toradol 15mg & Tylenol 1000mg to keep pain under control         Problem: Mobility  Goal: Risk for activity intolerance will decrease  Outcome: PROGRESSING AS EXPECTED  Pt using FWW for ambulation with assist 1x.  Pt called for assistance & tolerated well

## 2017-05-06 NOTE — PROGRESS NOTES
Patient doing well, no complaints. VSS  Dsg dci, ice machine on  D/C home today  Lovenox x 2 weeks  WBAT, crutches PRN  PT arranged next week

## 2017-05-06 NOTE — PROGRESS NOTES
Crutches were delivered and adjusted for patient. If there are any questions or adjustments needed please contact traction at [phone].

## 2017-05-06 NOTE — THERAPY
"Occupational Therapy Evaluation completed.   Functional Status: A&Ox4. WBAT LLE. Performs self-cares and functional mobility using FWW (crutch training in progress with PT at this time) safely and with Mod Indep/Sup.   Plan of Care: Patient with no further skilled OT needs in the acute care setting at this time.  Discharge Recommendations:  Equipment: Crutches. Post-acute therapy Discharge to home with outpatient additional skilled therapy services; first OP PT appt is on Tues..  D/C ready from OT.    See \"Rehab Therapy-Acute\" Patient Summary Report for complete documentation.    "

## 2017-05-06 NOTE — THERAPY
"Physical Therapy Evaluation completed.   Bed Mobility:  Supine to Sit: Modified Independent  Transfers: Sit to Stand: Modified Independent  Gait: Level Of Assist: Supervised with Crutches. CGA with stairs. Stable L knee at all times.      Plan of Care: Patient with no further skilled PT needs in the acute care setting at this time  Discharge Recommendations: Equipment: Crutches. Post-acute therapy Discharge to home with outpatient or home health for additional skilled therapy services.    72 yo male s/p L TKA (WBAT). PT completed all post op education and mobility training with crutches this session. Pt has good ROM 0-90 degrees and good pain control. His spouse will be with him at home and will assist him with stairs to ensure he sequence correctly upon return to home. He will f/u with OP PT beginning Tuesday and cont his HEP until then. No further questions/concerns for acute PT from patient or spouse. RN notified. Pt will need crutches - ortho tech notified.     See \"Rehab Therapy-Acute\" Patient Summary Report for complete documentation.     "

## 2017-05-06 NOTE — DISCHARGE INSTRUCTIONS
Weight bearing as tolerated with crutches. Leave dressing on for 5 days. Lovenox for 2 weeks.    Discharge Instructions    Discharged to home by car with relative. Discharged via wheelchair, hospital escort: Yes.  Special equipment needed: Crutches    Be sure to schedule a follow-up appointment with your primary care doctor or any specialists as instructed.     Discharge Plan:   Influenza Vaccine Indication: Patient Refuses    I understand that a diet low in cholesterol, fat, and sodium is recommended for good health. Unless I have been given specific instructions below for another diet, I accept this instruction as my diet prescription.   Other diet: n/a    Special Instructions: Discharge instructions for the Orthopedic Patient    Follow up with Primary Care Physician within 2 weeks of discharge to home, regarding:  Review of medications and diagnostic testing.  Surveillance for medical complications.  Workup and treatment of osteoporosis, if appropriate.     -Is this a Joint Replacement patient? Yes Total Joint Knee Replacement Discharge Instructions    Pain  - The goal is to slowly wean off the prescription pain medicine.  - Ice can be used for pain control.  20 minutes at a time is recommended, and never directly against your skin or incision.  - Most patients are off the pain pills by 3 weeks; others may require a low level of pain medications for many months.  If your pain continues to be severe, follow up with your physician.  Infection    Knee joint infections; occur in fewer than 2% of patients. The most common causes of infection following total knee replacement surgery are from bacteria that enter the bloodstream during dental procedures, urinary tract infections, or skin infections. These bacteria can lodge around your knee replacement and cause an infection.  - Keep the incision as clean and dry as possible.  - Always wash your hands before touching your incision.  - Skin infections tend to develop  around 7-10 days after surgery; most can be treated with oral antibiotics.  - Dental Care should be delayed for 3 months after surgery, your surgeon recommends taking a dose of antibiotics 1 hour prior to any dental procedure. After 2 years, most surgeons recommend antibiotics only before an extensive procedure.  Ask your surgeon what he recommends.  - Signs and symptoms of infection can include:  low grade fever, redness, pain, swelling and drainage from your incision.  Notify your surgeon immediately if you develop any of these symptoms.  Other instructions  - Bowel habits - constipation is extremely common and is caused by a combination of anesthesia, lack of mobility and pain medicine.  Use stool softeners or laxatives if necessary. It is important not to ignore this problem, as bowel obstructions can be a serious complication after joint replacement surgery.  - Mood/Energy Level - Many patients experience a lack of energy and endurance for up to 2-3 months after surgery.  Some may also feel down and can even become depressed.  This is likely due to the postoperative anemia, change in activity level, lack of sleep, pain medicine and just the emotional reaction to the surgery itself that is a big disruption in a person’s life.  This usually passes.  If symptoms persist, follow up with your primary physician.  - Returning to work - Your surgeon will give you more specific instructions. Depending on the type of activities you perform, it may be 6 to 8 weeks before you return to work.   Generally, if you work a sedentary job requiring little standing or walking, most patients may return within 2-6 weeks.  Manual labor jobs involving walking, lifting and standing may take longer. Your surgeon’s office can provide a release to part-time or light duty work early on in your recovery and progress you to full duty as able.    - Driving - If your left knee was replaced and you have an automatic transmission, you may be  able to begin driving in a week or so, provided you are no longer taking narcotic pain medication. If your right knee was replaced, avoid driving for 6 to 8 weeks. Remember that your reflexes may not be as sharp as before your surgery. Ask your surgeon for specific instructions.   - Avoiding falls - A fall during the first few weeks after surgery can damage your new knee and may result in a need for further surgery.   throw rugs and tack down loose carpeting.  Be aware of floor hazards such as pets, small objects or uneven surfaces.    - [employer] Metal Detectors - The sensitivity of metal detectors varies and it is likely that your prosthesis will cause an alarm.  Inform the  of your artificial joint.  Diet  - Resume your normal diet as tolerated.  - It is important to achieve a healthy nutritional status by eating a well balanced diet on a regular basis.  - Your physician may recommend that you take iron and vitamin supplements.   - Continue to drink plenty of fluids.  Shower/Bathing  - You may shower as soon as you get home from the hospital unless otherwise instructed.  - Keep your incision out of water.  To keep the incision dry when showering, cover it with a plastic bag or plastic wrap.  - Pat incision dry if it gets wet.  Don’t rub.  - Do not submerge in a bath until staples are out and the incision is completely healed. (Approximately 6-8 weeks)  Dressing Change:  Procedure (if recommended by your physician)  - Wash hands.  - Open all new dressing change materials.  - Remove old dressing and discard.  - Inspect incision for redness, increase in clear drainage, yellow/green drainage, odor and surrounding skin hot to touch.  -  ABD (large gauze) pad or “island dressing” by one corner and lay over the incision.  Be careful not to touch the inside of the dressing that will lay over the incision.  - Secure in place as instructed (Ace wrap or tape).    Swelling/Bruising    - Swelling  can last from 3-6 months.  - Elevate your leg higher than your heart while reclining.   The first week you are home you should elevate your leg an equal amount of time, as you are active.    - Anti-inflammatory pills can be taken once you have stopped the blood thinners.  - The swelling is usually worse after you go home since you are upright for longer periods of time.  - Bruising is common and can involve the entire leg including the thigh, calf and even foot. Bruising often does not appear until after you arrive home and it can be quite dramatic- purple, black, and green.  The bruising you can see is not usually concerning and will subside without any treatment.      Blood Clot Prevention  Blood clots in the legs and the less common, but frightening, clots that travel to the lungs are a real focus of our preventative. Most patients are at standard risk for them, but those patients who are at higher risk include people who have had previous clots, a family history of clotting, smoking, diabetes, obesity, advanced age, use of estrogen and a sedentary lifestyle.    - Signs of blood clots in legs - Swelling in thigh, calf or ankle that does not go down with elevation.  Pain, heat and tenderness in calf, back of calf or groin area.  NOTE: blood clots can occur in either leg.  - You have been receiving anticoagulant therapy (blood thinners) in the hospital and you may be instructed to continue at home depending on your risk factors.  - Your risk for developing a clot continues for up to 2-3 months after surgery.  You should avoid prolonged sitting and dehydration during that time (long air trips and car trips).  If you do take a trip during this time, please get up and move around every 1- 1.5 hours.  - If you are prescribed blood-thinning medication for home, follow instructions as directed. (Handouts provided if applicable).      Activity  Once home, you should continue to stay active. The key is to remember not to  overdo it! While you can expect some good days and some bad days, you should notice a gradual improvement and a gradual increase in your endurance over the next 6 to 12 months. Exercise is a critical component of home care, particularly during the first few weeks after surgery.     - Normal activities of daily living You should be able to resume most within 3 to 6 weeks following surgery. Some pain with activity and at night is common for several weeks after surgery  -  Physical Therapy Exercises - Continue to do the exercises prescribed for at least two months after surgery. Riding a stationary bicycle can help maintain muscle tone and keep your knee flexible. Try to achieve the maximum degree of bending and extension possible. (handout provided by Therapist).  - Sexual Activity -. Your surgeon can tell you when it safe to resume sexual activity.    - Sleeping Positions - You can safely sleep on your back, on either side, or on your stomach.   - Other Activities - Walk as much as you like, but remember that walking is no substitute for the exercises your doctor and physical therapist will prescribe. Lower impact activities are preferred.  If you have specific questions, consult your Surgeon.    When to Call the Doctor   Call the physician if:   - Fever over 100.5? F  - Increased pain, drainage, redness, odor or heat around the incision area  - Shaking chills  - Increased knee pain with activity and rest  - Increased pain in calf, tenderness or redness above or below the knee  - Increased swelling of calf, ankle, foot  - Sudden increased shortness of breath, sudden onset of chest pain, localized chest pain with coughing  - Incision opening  Or, if there are any questions or concerns about medications or care.       -Is this patient being discharged with medication to prevent blood clots?  Yes, Lovenox Enoxaparin injection  What is this medicine?  ENOXAPARIN (ee nox a PA rin) is used after knee, hip, or abdominal  surgeries to prevent blood clotting. It is also used to treat existing blood clots in the lungs or in the veins.  This medicine may be used for other purposes; ask your health care provider or pharmacist if you have questions.  COMMON BRAND NAME(S): Lovenox  What should I tell my health care provider before I take this medicine?  They need to know if you have any of these conditions:  -bleeding disorders, hemorrhage, or hemophilia  -infection of the heart or heart valves  -kidney or liver disease  -previous stroke  -prosthetic heart valve  -recent surgery or delivery of a baby  -ulcer in the stomach or intestine, diverticulitis, or other bowel disease  -an unusual or allergic reaction to enoxaparin, heparin, pork or pork products, other medicines, foods, dyes, or preservatives  -pregnant or trying to get pregnant  -breast-feeding  How should I use this medicine?  This medicine is for injection under the skin. It is usually given by a health-care professional. You or a family member may be trained on how to give the injections. If you are to give yourself injections, make sure you understand how to use the syringe, measure the dose if necessary, and give the injection. To avoid bruising, do not rub the site where this medicine has been injected. Do not take your medicine more often than directed. Do not stop taking except on the advice of your doctor or health care professional.  Make sure you receive a puncture-resistant container to dispose of the needles and syringes once you have finished with them. Do not reuse these items. Return the container to your doctor or health care professional for proper disposal.  Talk to your pediatrician regarding the use of this medicine in children. Special care may be needed.  Overdosage: If you think you have taken too much of this medicine contact a poison control center or emergency room at once.  NOTE: This medicine is only for you. Do not share this medicine with  others.  What if I miss a dose?  If you miss a dose, take it as soon as you can. If it is almost time for your next dose, take only that dose. Do not take double or extra doses.  What may interact with this medicine?  Do not take this medicine with any of the following medications:  -aspirin and aspirin-like medicines  -heparin  -mifepristone  -palifermin  -warfarin   This medicine may also interact with the following medications:  -cilostazol  -clopidogrel  -dipyridamole  -NSAIDs, medicines for pain and inflammation, like ibuprofen or naproxen  -sulfinpyrazone  -ticlopidine  This list may not describe all possible interactions. Give your health care provider a list of all the medicines, herbs, non-prescription drugs, or dietary supplements you use. Also tell them if you smoke, drink alcohol, or use illegal drugs. Some items may interact with your medicine.  What should I watch for while using this medicine?  Visit your doctor or health care professional for regular checks on your progress. Your condition will be monitored carefully while you are receiving this medicine.  If you are going to have surgery, tell your doctor or health care professional that you are taking this medicine.  Do not stop taking this medicine without first talking to your doctor. Be sure to refill your prescription before you run out of medicine.  Avoid sports and activities that might cause injury while you are using this medicine. Severe falls or injuries can cause unseen bleeding. Be careful when using sharp tools or knives. Consider using an electric razor. Take special care brushing or flossing your teeth. Report any injuries, bruising, or red spots on the skin to your doctor or health care professional.  What side effects may I notice from receiving this medicine?  Side effects that you should report to your doctor or health care professional as soon as possible:  -allergic reactions like skin rash, itching or hives, swelling of the  face, lips, or tongue  -dark urine  -feeling faint or lightheaded, falls  -fever  -heavy menstrual bleeding  -signs and symptoms of bleeding such as bloody or black, tarry stools; red or dark-brown urine; spitting up blood or brown material that looks like coffee grounds; red spots on the skin; unusual bruising or bleeding from the eye, gums, or nose  -signs and symptoms of a blood clot such as breathing problems; changes in vision; chest pain; severe, sudden headache; pain, swelling, warmth in the leg; trouble speaking; sudden numbness or weakness of the face, arm or leg  Side effects that usually do not require medical attention (report to your doctor or health care professional if they continue or are bothersome):  -pain or irritation at the injection site  This list may not describe all possible side effects. Call your doctor for medical advice about side effects. You may report side effects to FDA at 3-530-FDA-3809.  Where should I keep my medicine?  Keep out of the reach of children.  Store at room temperature between 15 and 30 degrees C (59 and 86 degrees F). Do not freeze. If your injections have been specially prepared, you may need to store them in the refrigerator. Ask your pharmacist. Throw away any unused medicine after the expiration date.  NOTE: This sheet is a summary. It may not cover all possible information. If you have questions about this medicine, talk to your doctor, pharmacist, or health care provider.  © 2014, [employer]. (3/31/2014 10:04:27 AM)      · Is patient discharged on Warfarin / Coumadin?   No     · Is patient Post Blood Transfusion?  No    Depression / Suicide Risk    As you are discharged from this [facility], it is important to learn how to keep safe from harming yourself.    Recognize the warning signs:  · Abrupt changes in personality, positive or negative- including increase in energy   · Giving away possessions  · Change in eating patterns- significant  weight changes-  positive or negative  · Change in sleeping patterns- unable to sleep or sleeping all the time   · Unwillingness or inability to communicate  · Depression  · Unusual sadness, discouragement and loneliness  · Talk of wanting to die  · Neglect of personal appearance   · Rebelliousness- reckless behavior  · Withdrawal from people/activities they love  · Confusion- inability to concentrate     If you or a loved one observes any of these behaviors or has concerns about self-harm, here's what you can do:  · Talk about it- your feelings and reasons for harming yourself  · Remove any means that you might use to hurt yourself (examples: pills, rope, extension cords, firearm)  · Get professional help from the community (Mental Health, Substance Abuse, psychological counseling)  · Do not be alone:Call your Safe Contact- someone whom you trust who will be there for you.  · Call your local CRISIS HOTLINE 040-2146 or 016-484-8670  · Call your local Children's Mobile Crisis Response Team [place] (361) 931-2493 or www.[facility]  · Call the toll free National Suicide Prevention Hotlines   · National Suicide Prevention Lifeline 128-954-DJMM (1476)  · National Hope Line Network 800-SUICIDE (038-6037)

## 2017-05-06 NOTE — PROGRESS NOTES
Received report from day shift nurse. Assumed pt care at 1915. Pt is A&Ox4, resting comfortably in bed. Pt on 1.5L of oxygen via nasal cannula. No signs of SOB/respiratory distress. Assessment completed, Labs noted, VSS. Pt c/o no pain at this moment, but encouraged pt to take Oxy 5mg per MAR to keep pain under control. Night time meds given per MAR. Surgical dressing & ace wrap to L knee CDI, +CMS, +pulse. Polar ice & SCDs on. Fall precautions in place. Bed at lowest position. Bed locked. Treaded socks on. Call light and personal belongings within reach. Continue to monitor

## 2017-05-06 NOTE — CARE PLAN
Problem: Safety  Goal: Will remain free from injury  Fall precautions remain in place: treaded socks on pt, appropriate signs on doorway, IV pole on side of bathroom, lowest bed rails down, bed in lowest position and locked, call light and phone within reach, bed alarm on.

## 2017-05-08 ENCOUNTER — OFFICE VISIT (OUTPATIENT)
Dept: URGENT CARE | Facility: PHYSICIAN GROUP | Age: 72
End: 2017-05-08
Payer: MEDICARE

## 2017-05-08 VITALS
HEIGHT: 66 IN | DIASTOLIC BLOOD PRESSURE: 70 MMHG | WEIGHT: 225 LBS | BODY MASS INDEX: 36.16 KG/M2 | SYSTOLIC BLOOD PRESSURE: 132 MMHG | TEMPERATURE: 100.6 F | RESPIRATION RATE: 18 BRPM | HEART RATE: 94 BPM | OXYGEN SATURATION: 93 %

## 2017-05-08 DIAGNOSIS — S05.02XA CORNEAL ABRASION, BILATERAL, INITIAL ENCOUNTER: ICD-10-CM

## 2017-05-08 DIAGNOSIS — S05.01XA CORNEAL ABRASION, BILATERAL, INITIAL ENCOUNTER: ICD-10-CM

## 2017-05-08 DIAGNOSIS — H10.33 ACUTE CONJUNCTIVITIS OF BOTH EYES, UNSPECIFIED ACUTE CONJUNCTIVITIS TYPE: ICD-10-CM

## 2017-05-08 DIAGNOSIS — R50.9 FEVER, UNSPECIFIED FEVER CAUSE: ICD-10-CM

## 2017-05-08 PROCEDURE — G8598 ASA/ANTIPLAT THER USED: HCPCS | Performed by: PHYSICIAN ASSISTANT

## 2017-05-08 PROCEDURE — 4040F PNEUMOC VAC/ADMIN/RCVD: CPT | Mod: 8P | Performed by: PHYSICIAN ASSISTANT

## 2017-05-08 PROCEDURE — 1111F DSCHRG MED/CURRENT MED MERGE: CPT | Performed by: PHYSICIAN ASSISTANT

## 2017-05-08 PROCEDURE — 1101F PT FALLS ASSESS-DOCD LE1/YR: CPT | Performed by: PHYSICIAN ASSISTANT

## 2017-05-08 PROCEDURE — G8432 DEP SCR NOT DOC, RNG: HCPCS | Performed by: PHYSICIAN ASSISTANT

## 2017-05-08 PROCEDURE — 1036F TOBACCO NON-USER: CPT | Performed by: PHYSICIAN ASSISTANT

## 2017-05-08 PROCEDURE — 3017F COLORECTAL CA SCREEN DOC REV: CPT | Performed by: PHYSICIAN ASSISTANT

## 2017-05-08 PROCEDURE — G8419 CALC BMI OUT NRM PARAM NOF/U: HCPCS | Performed by: PHYSICIAN ASSISTANT

## 2017-05-08 PROCEDURE — 99214 OFFICE O/P EST MOD 30 MIN: CPT | Performed by: PHYSICIAN ASSISTANT

## 2017-05-08 NOTE — PROGRESS NOTES
Chief Complaint   Patient presents with   • Eye Problem     itchy, drainage x 3 days       HISTORY OF PRESENT ILLNESS: Patient is a 71 y.o. male who presents today with his wife for evaluation of possible pinkeye. Patient states he had a total left knee replacement on 5/5/17. He states his symptoms started when he was told hospital. He feels that there is grit in both eyes and woke up this morning with exudate in both eyes. He denies blurry vision but states his eyes are itching and burning. He has not used any over-the-counter medication. He continues to have a gritty feeling in both eyes.     Patient Active Problem List    Diagnosis Date Noted   • Degenerative arthritis of left knee 05/05/2017   • Pain in lateral portion of left knee 04/28/2017   • Marital problems 12/08/2016   • Obesity (BMI 30-39.9) 12/08/2016   • Obesity (BMI 35.0-39.9 without comorbidity) ([facility]) 12/08/2016   • Chronic right shoulder pain 12/03/2015   • Left hip pain 02/26/2015   • Functional constipation 02/26/2015   • Personal history of skin cancer 08/22/2014   • Elevated fasting glucose 01/07/2014   • COPD (chronic obstructive pulmonary disease) (CMS-[facility]) 01/07/2014   • Essential hypertension, benign 06/28/2013   • CAD (coronary artery disease), native coronary artery 05/19/2011   • Vitamin D deficiency 05/14/2010   • Mixed hyperlipidemia    • Personal history of malignant neoplasm of bronchus and lung    • Personal history of malignant neoplasm of prostate    • Osteoarthritis of multiple joints        Allergies:Ampicillin; Clindamycin; and Demerol    Current Outpatient Prescriptions Ordered in [facility]   Medication Sig Dispense Refill   • gentamicin (GARAMYCIN) 0.3 % Ointment Place 0.5 Inches in both eyes 3 times a day for 7 days. 1 Tube 0   • aspirin (ASA) 325 MG Tab      • Probiotic Product (PROBIOTIC DAILY PO) Take  by mouth every day.     • Simethicone (GAS RELIEF EXTRA STRENGTH PO) Take  by mouth as needed.     • Cholecalciferol (VITAMIN  D3) 5000 UNITS Cap Take 1 Cap by mouth every day.     • atorvastatin (LIPITOR) 40 MG Tab Take 1 Tab by mouth every bedtime. (Patient taking differently: Take 20 mg by mouth every bedtime.) 90 Tab 3   • triamterene/hctz (MAXZIDE-25/DYAZIDE) 37.5-25 MG Cap Take 2 Caps by mouth 2 times a day. (Patient taking differently: Take 2 Caps by mouth every day.) 360 Cap 3   • ipratropium (ATROVENT) 17 MCG/ACT Aero Soln Inhale 2 Puffs by mouth every 6 hours. (Patient taking differently: Inhale 2 Puffs by mouth every 6 hours as needed.) 1 Inhaler 3   • etodolac (LODINE) 500 MG tablet Take 1 Tab by mouth 2 times a day. 180 Tab 3   • OCUVITE PO Take  by mouth every day. WITH ZINC      • albuterol 108 (90 BASE) MCG/ACT Aero Soln inhalation aerosol Inhale 2 Puffs by mouth every 6 hours as needed for Shortness of Breath. 1 Inhaler 3   • ipratropium (ATROVENT) 0.02 % Solution 1 mL by Nebulization route 2 times a day. (Patient taking differently: 0.2 mg by Nebulization route 2 times a day as needed.) 60 Vial 0   • albuterol (PROVENTIL) 2.5mg/3ml NEBU solution for nebulization 3 mL by Nebulization route every four hours as needed for Shortness of Breath. 75 mL 3     No current [facility]-ordered facility-administered medications on file.       Past Medical History   Diagnosis Date   • Mixed hyperlipidemia      CONTROLLED   • Coronary atherosclerosis      CONTROLLED   • Personal history of malignant neoplasm of bronchus and lung      STABLE   • Personal history of malignant neoplasm of prostate      STABLE   • Exposure      ORGANIC SOLVENT EXPOSURE IN THE 1980'S BUT HEAVY DOSES OF TYLENE AND XYLENE   • EKG abnormal 2000     ABN EKG W/OLD INFERIOR MI   • Colon polyps      NONCANCEROUS COLONIC POLYS 2004,MOST RECENT COLONOSCOPY 2005 WAS NEGATIVE   • CAD (coronary artery disease)      cad with an inconclusive thallium in 2004,  treated with medical management   • Spinal fusion      C4-6, SPINAL FUSION PER DR JAY   • Osteoarthrosis involving, or  "with mention of more than one site, but not specified as generalized, multiple sites      CONTROLLED   • Essential hypertension, malignant      CONTROLLED   • Diverticulitis      TREATED WITH HEMICOLECTOMY   • Lung cancer (CMS-HCC)      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 NO FURTHER PROBLEMS WITH FOLLOWING SURGERIES.    • Nephrolithiasis 1967, 1968   • Cancer (CMS-HCC) 2002, 2006     Lung (2002)-surgery and radiation. Prostate (2006)-brachytherapy   • Carcinoma in situ of respiratory system 1998     Upper lung lobectomy and radiation therapy   • Breath shortness 4/21/17     States only with exess exertion. No changes to status.   • Cataract 2007     Bilateral phaco with IOL   • Pneumonia 2015     Also had previously   • Hand pain, right 5/14/2010   • Arm pain, left 1/7/2014   • Pain 4/21/17     \"all over\"       Social History   Substance Use Topics   • Smoking status: Former Smoker -- 3.00 packs/day for 38 years     Types: Cigarettes     Quit date: 01/20/2001   • Smokeless tobacco: Never Used   • Alcohol Use: 0.0 oz/week     0 Standard drinks or equivalent per week      Comment: 2 per week       Family Status   Relation Status Death Age   • Mother Other      adopted no history   • Father Other      adopted and no history     Family History   Problem Relation Age of Onset   • Adopted: Yes   • Diabetes Neg Hx        ROS:    Review of Systems   Constitutional: Negative for fever, chills, weight loss and malaise/fatigue.   HENT: Negative for ear pain, nosebleeds, congestion, " "sore throat and neck pain.    Eyes: Negative for blurred vision.   Respiratory: Negative for cough, sputum production, shortness of breath and wheezing.    Cardiovascular: Negative for chest pain, palpitations, orthopnea and leg swelling.   Gastrointestinal: Negative for heartburn, nausea, vomiting and abdominal pain.   Genitourinary: Negative for dysuria, urgency and frequency.       Exam:  Blood pressure 132/70, pulse 94, temperature 38.1 °C (100.6 °F), resp. rate 18, height 1.676 m (5' 5.98\"), weight 102.059 kg (225 lb), SpO2 93 %.  General: Normal appearing. No distress.  HEENT: Conjunctiva is injected in the left eye only. Exudate is noted bilaterally. 1 mm area of fluorescein uptake noted in the 3:00 position over the edge of the iris. Pinpoint areas of fluorescein uptake noted in the left eye in the 12:00 and 2:00 position over the edge of the iris. 2-3 mm pupils, PERRL.  Pulmonary: No respiratory distress noted.  Cardiovascular: Regular rate and rhythm without murmur.   Neurologic: Grossly nonfocal.  Skin: No obvious lesions.  Psych: Normal mood. Alert and oriented x3. Judgment and insight is normal.    Assessment/Plan  1. Corneal abrasion, bilateral, initial encounter  gentamicin (GARAMYCIN) 0.3 % Ointment   2. Acute conjunctivitis of both eyes, unspecified acute conjunctivitis type   Usual medication as directed.  gentamicin (GARAMYCIN) 0.3 % Ointment   3. Fever, unspecified fever cause   Spoke with Miguelina from Dr. Griffith's office, the patient's knee surgeon, and she indicated they become concerned about a fever if it is higher than 101 or if there is any unusual drainage or increasing redness of the knee. Relayed this information to Inés, the patient's wife, and she verbalized understanding.           "

## 2017-05-08 NOTE — MR AVS SNAPSHOT
"        Jamie Pacheco   2017 3:10 PM   Office Visit   MRN: [id number]    Department:  [facility]   Dept Phone:  [phone]    Description:  Male : 1945   Provider:  Lu Peterson PA-C           Reason for Visit     Eye Problem itchy, drainage x 3 days      Allergies as of 2017     Allergen Noted Reactions    Ampicillin 2009       CAUSED A CHILDHOOD REACTION    Clindamycin 2009   Diarrhea    Demerol 2009       CAUSES ECCHYMOSIS AND IS INEFFECTIVE      You were diagnosed with     Corneal abrasion, bilateral, initial encounter   [[id number]]       Acute conjunctivitis of both eyes, unspecified acute conjunctivitis type   [[id number]]       Fever, unspecified fever cause   [[id number]]         Vital Signs     Blood Pressure Pulse Temperature Respirations Height Weight    132/70 mmHg 94 38.1 °C (100.6 °F) 18 1.676 m (5' 5.98\") 102.059 kg (225 lb)    Body Mass Index Oxygen Saturation Smoking Status             36.33 kg/m2 93% Former Smoker         Basic Information     Date Of Birth Sex Race Ethnicity Preferred Language    1945 Male White Non- English      Your appointments     2017  8:00 AM   Established Patient with DONNA Jarquin   [facility] [address] [address] [phone]           You will be receiving a confirmation call a few days before your appointment from our automated call confirmation system.              Problem List              ICD-10-CM Priority Class Noted - Resolved    Mixed hyperlipidemia E78.2   Unknown - Present    Personal history of malignant neoplasm of bronchus and lung Z85.118   Unknown - Present    Personal history of malignant neoplasm of prostate Z85.46   Unknown - Present    Osteoarthritis of multiple joints M15.9   Unknown - Present    Vitamin D deficiency (Chronic) E55.9   2010 - Present    CAD (coronary artery disease), native coronary artery I25.10   " 5/19/2011 - Present    Essential hypertension, benign I10   6/28/2013 - Present    Elevated fasting glucose R73.01   1/7/2014 - Present    COPD (chronic obstructive pulmonary disease) (CMS-[facility]) J44.9   1/7/2014 - Present    Personal history of skin cancer Z85.828   8/22/2014 - Present    Left hip pain M25.552   2/26/2015 - Present    Functional constipation K59.04   2/26/2015 - Present    Chronic right shoulder pain M25.511, G89.29   12/3/2015 - Present    Marital problems Z63.0   12/8/2016 - Present    Obesity (BMI 30-39.9) E66.9   12/8/2016 - Present    Obesity (BMI 35.0-39.9 without comorbidity) ([facility]) E66.9   12/8/2016 - Present    Pain in lateral portion of left knee M25.562   4/28/2017 - Present    Degenerative arthritis of left knee M17.12   5/5/2017 - Present      Health Maintenance        Date Due Completion Dates    IMM ZOSTER VACCINE 12/10/2007 ---    IMM PNEUMOCOCCAL 65+ (ADULT) LOW/MEDIUM RISK SERIES (1 of 2 - PCV13) 11/3/2017 (Originally 12/10/2012) ---    IMM DTaP/Tdap/Td Vaccine (1 - Tdap) 11/3/2017 (Originally 12/10/1966) ---    COLONOSCOPY 4/15/2021 4/15/2016            Current Immunizations     Pneumococcal Vaccine (UF)Historical Data 1/1/2004      Below and/or attached are the medications your provider expects you to take. Review all of your home medications and newly ordered medications with your provider and/or pharmacist. Follow medication instructions as directed by your provider and/or pharmacist. Please keep your medication list with you and share with your provider. Update the information when medications are discontinued, doses are changed, or new medications (including over-the-counter products) are added; and carry medication information at all times in the event of emergency situations     Allergies:  AMPICILLIN - (reactions not documented)     CLINDAMYCIN - Diarrhea     DEMEROL - (reactions not documented)               Medications  Valid as of: May 08, 2017 -  4:36 PM    Generic Name  Brand Name Tablet Size Instructions for use    Albuterol Sulfate (Nebu Soln) PROVENTIL 2.5mg/3ml 3 mL by Nebulization route every four hours as needed for Shortness of Breath.        Albuterol Sulfate (Aero Soln) albuterol 108 (90 BASE) MCG/ACT Inhale 2 Puffs by mouth every 6 hours as needed for Shortness of Breath.        Aspirin (Tab)  MG         Atorvastatin Calcium (Tab) LIPITOR 40 MG Take 1 Tab by mouth every bedtime.        Cholecalciferol (Cap) vitamin D3 5000 UNITS Take 1 Cap by mouth every day.        Etodolac (Tab) LODINE 500 MG Take 1 Tab by mouth 2 times a day.        Gentamicin Sulfate (Ointment) GARAMYCIN 0.3 % Place 0.5 Inches in both eyes 3 times a day for 7 days.        Ipratropium Bromide (Solution) ATROVENT 0.02 % 1 mL by Nebulization route 2 times a day.        Ipratropium Bromide HFA (Aero Soln) ATROVENT 17 MCG/ACT Inhale 2 Puffs by mouth every 6 hours.        Multiple Vitamins-Minerals   Take  by mouth every day. WITH ZINC         Probiotic Product   Take  by mouth every day.        Simethicone   Take  by mouth as needed.        Triamterene-HCTZ (Cap) MAXZIDE-25/DYAZIDE 37.5-25 MG Take 2 Caps by mouth 2 times a day.        .                 Medicines prescribed today were sent to:     [facility] [address] [place] [address]    Phone: [phone] Fax: [phone]    Open 24 Hours?: No      Medication refill instructions:       If your prescription bottle indicates you have medication refills left, it is not necessary to call your provider’s office. Please contact your pharmacy and they will refill your medication.    If your prescription bottle indicates you do not have any refills left, you may request refills at any time through one of the following ways: The online [employer] system (except Urgent Care), by calling your provider’s office, or by asking your pharmacy to contact your provider’s office with a refill  request. Medication refills are processed only during regular business hours and may not be available until the next business day. Your provider may request additional information or to have a follow-up visit with you prior to refilling your medication.   *Please Note: Medication refills are assigned a new Rx number when refilled electronically. Your pharmacy may indicate that no refills were authorized even though a new prescription for the same medication is available at the pharmacy. Please request the medicine by name with the pharmacy before contacting your provider for a refill.           [employer] Access Code: Activation code not generated  Current [employer] Status: Active

## 2017-07-31 ENCOUNTER — PATIENT OUTREACH (OUTPATIENT)
Dept: HEALTH INFORMATION MANAGEMENT | Facility: OTHER | Age: 72
End: 2017-07-31

## 2017-08-09 DIAGNOSIS — M15.9 PRIMARY OSTEOARTHRITIS INVOLVING MULTIPLE JOINTS: ICD-10-CM

## 2017-08-09 RX ORDER — ETODOLAC 500 MG/1
500 TABLET, FILM COATED ORAL 2 TIMES DAILY
Qty: 180 TAB | Refills: 0 | Status: SHIPPED | OUTPATIENT
Start: 2017-08-09 | End: 2017-12-12 | Stop reason: SDUPTHER

## 2017-08-09 NOTE — TELEPHONE ENCOUNTER
Was the patient seen in the last year in this department? Yes     Does patient have an active prescription for medications requested? No     Received Request Via: Pharmacy      Pt met protocol?: Yes    LAST OV 04/28/2017

## 2017-10-31 ENCOUNTER — HOSPITAL ENCOUNTER (OUTPATIENT)
Dept: LAB | Facility: MEDICAL CENTER | Age: 72
End: 2017-10-31
Attending: NURSE PRACTITIONER
Payer: MEDICARE

## 2017-10-31 DIAGNOSIS — I10 ESSENTIAL HYPERTENSION, BENIGN: ICD-10-CM

## 2017-10-31 DIAGNOSIS — R73.01 ELEVATED FASTING GLUCOSE: ICD-10-CM

## 2017-10-31 DIAGNOSIS — E78.2 MIXED HYPERLIPIDEMIA: ICD-10-CM

## 2017-10-31 LAB
ALBUMIN SERPL BCP-MCNC: 4.1 G/DL (ref 3.2–4.9)
ALBUMIN/GLOB SERPL: 1.4 G/DL
ALP SERPL-CCNC: 50 U/L (ref 30–99)
ALT SERPL-CCNC: 20 U/L (ref 2–50)
ANION GAP SERPL CALC-SCNC: 8 MMOL/L (ref 0–11.9)
AST SERPL-CCNC: 19 U/L (ref 12–45)
BILIRUB SERPL-MCNC: 0.7 MG/DL (ref 0.1–1.5)
BUN SERPL-MCNC: 19 MG/DL (ref 8–22)
CALCIUM SERPL-MCNC: 9.7 MG/DL (ref 8.5–10.5)
CHLORIDE SERPL-SCNC: 105 MMOL/L (ref 96–112)
CHOLEST SERPL-MCNC: 101 MG/DL (ref 100–199)
CO2 SERPL-SCNC: 28 MMOL/L (ref 20–33)
CREAT SERPL-MCNC: 1.11 MG/DL (ref 0.5–1.4)
EST. AVERAGE GLUCOSE BLD GHB EST-MCNC: 126 MG/DL
GFR SERPL CREATININE-BSD FRML MDRD: >60 ML/MIN/1.73 M 2
GLOBULIN SER CALC-MCNC: 3 G/DL (ref 1.9–3.5)
GLUCOSE SERPL-MCNC: 95 MG/DL (ref 65–99)
HBA1C MFR BLD: 6 % (ref 0–5.6)
HDLC SERPL-MCNC: 34 MG/DL
LDLC SERPL CALC-MCNC: 34 MG/DL
POTASSIUM SERPL-SCNC: 3.9 MMOL/L (ref 3.6–5.5)
PROT SERPL-MCNC: 7.1 G/DL (ref 6–8.2)
SODIUM SERPL-SCNC: 141 MMOL/L (ref 135–145)
TRIGL SERPL-MCNC: 166 MG/DL (ref 0–149)

## 2017-10-31 PROCEDURE — 36415 COLL VENOUS BLD VENIPUNCTURE: CPT

## 2017-10-31 PROCEDURE — 83036 HEMOGLOBIN GLYCOSYLATED A1C: CPT | Mod: GA

## 2017-10-31 PROCEDURE — 80053 COMPREHEN METABOLIC PANEL: CPT

## 2017-10-31 PROCEDURE — 80061 LIPID PANEL: CPT

## 2017-12-12 ENCOUNTER — OFFICE VISIT (OUTPATIENT)
Dept: MEDICAL GROUP | Facility: PHYSICIAN GROUP | Age: 72
End: 2017-12-12
Payer: MEDICARE

## 2017-12-12 VITALS
OXYGEN SATURATION: 96 % | SYSTOLIC BLOOD PRESSURE: 122 MMHG | RESPIRATION RATE: 16 BRPM | BODY MASS INDEX: 34.23 KG/M2 | WEIGHT: 213 LBS | HEIGHT: 66 IN | TEMPERATURE: 99.2 F | DIASTOLIC BLOOD PRESSURE: 80 MMHG | HEART RATE: 76 BPM

## 2017-12-12 DIAGNOSIS — M15.9 PRIMARY OSTEOARTHRITIS INVOLVING MULTIPLE JOINTS: ICD-10-CM

## 2017-12-12 DIAGNOSIS — E66.9 OBESITY (BMI 30-39.9): ICD-10-CM

## 2017-12-12 DIAGNOSIS — J43.9 PULMONARY EMPHYSEMA, UNSPECIFIED EMPHYSEMA TYPE (HCC): ICD-10-CM

## 2017-12-12 DIAGNOSIS — E78.2 MIXED HYPERLIPIDEMIA: ICD-10-CM

## 2017-12-12 DIAGNOSIS — E55.9 VITAMIN D DEFICIENCY: Chronic | ICD-10-CM

## 2017-12-12 DIAGNOSIS — I10 ESSENTIAL HYPERTENSION, BENIGN: ICD-10-CM

## 2017-12-12 DIAGNOSIS — R73.01 ELEVATED FASTING GLUCOSE: ICD-10-CM

## 2017-12-12 DIAGNOSIS — I25.119 CORONARY ARTERY DISEASE INVOLVING NATIVE CORONARY ARTERY OF NATIVE HEART WITH ANGINA PECTORIS (HCC): ICD-10-CM

## 2017-12-12 PROCEDURE — 99214 OFFICE O/P EST MOD 30 MIN: CPT | Performed by: NURSE PRACTITIONER

## 2017-12-12 RX ORDER — TRIAMTERENE AND HYDROCHLOROTHIAZIDE 37.5; 25 MG/1; MG/1
2 CAPSULE ORAL DAILY
Qty: 180 CAP | Refills: 3 | Status: SHIPPED | OUTPATIENT
Start: 2017-12-12 | End: 2018-12-12 | Stop reason: SDUPTHER

## 2017-12-12 RX ORDER — ALBUTEROL SULFATE 90 UG/1
2 AEROSOL, METERED RESPIRATORY (INHALATION) EVERY 6 HOURS PRN
Qty: 1 INHALER | Refills: 3 | Status: SHIPPED | OUTPATIENT
Start: 2017-12-12 | End: 2021-03-12 | Stop reason: SDUPTHER

## 2017-12-12 RX ORDER — ETODOLAC 500 MG/1
500 TABLET, FILM COATED ORAL 2 TIMES DAILY
Qty: 180 TAB | Refills: 3 | Status: SHIPPED | OUTPATIENT
Start: 2017-12-12 | End: 2018-12-12 | Stop reason: SDUPTHER

## 2017-12-12 RX ORDER — ATORVASTATIN CALCIUM 40 MG/1
20 TABLET, FILM COATED ORAL
Qty: 45 TAB | Refills: 3 | Status: SHIPPED | OUTPATIENT
Start: 2017-12-12 | End: 2018-12-12 | Stop reason: SDUPTHER

## 2017-12-12 NOTE — ASSESSMENT & PLAN NOTE
This is a chronic condition, stable and very well controlled on current medications including albuterol and Atrovent as needed. He tolerated these medications well so significant bothersome side effects. He does need refills, these were called in for him. He will follow-up with me in 6 months.

## 2017-12-12 NOTE — ASSESSMENT & PLAN NOTE
This is a chronic condition, stable and fairly well controlled on atorvastatin 20 mg daily. His lipid profile is as follows:  Component      Latest Ref Rng & Units 10/31/2017           6:13 AM   Cholesterol,Tot      100 - 199 mg/dL 101   Triglycerides      0 - 149 mg/dL 166 (H)   HDL      >=40 mg/dL 34 (A)   LDL      <100 mg/dL 34   We discussed the importance of increasing physical activity to help improve the HDL. He is to continue with healthy low-fat, low-cholesterol diet, regular physical activity and continued efforts towards weight loss. He does need refills, these were called in for him. He tolerates them well with no significant bothersome side effects.

## 2017-12-12 NOTE — ASSESSMENT & PLAN NOTE
This is a chronic condition, uncontrolled. His weight is 213 pounds, BMI is 34.38. However he is down 12 pounds since his last visit with us in May 2017. He continues to ride his stationary bike on a regular basis and he tries to watch his diet.

## 2017-12-12 NOTE — PROGRESS NOTES
Chief Complaint   Patient presents with   • Hypertension     fv labs         This is a 72 y.o.male patient that presents today with the following:Follow-up visit, review, discuss acute and chronic conditions, medication refills    CAD (coronary artery disease), native coronary artery  This is a chronic condition, stable on current medications. He has been followed by cardiology in past but he no longer sees cardiology. He recently saw a cardiologist in early 2017 to be cleared for knee surgery. He tolerates all of his medications well, he is on statin and triamterene hydrochlorothiazide. He has previously been on aspirin but stopped taking this, he does not get clear reason why he does not want to take this. I did encourage him to at least take a low dose 81 mg aspirin daily.    COPD (chronic obstructive pulmonary disease)  This is a chronic condition, stable and very well controlled on current medications including albuterol and Atrovent as needed. He tolerated these medications well so significant bothersome side effects. He does need refills, these were called in for him. He will follow-up with me in 6 months.    Elevated fasting glucose  Patient has history of elevated fasting glucose, his most recent hemoglobin A1c was 6.0%. He continues to work on lifestyle modifications including healthy diet, regular physical activity and continued efforts towards weight loss. He is down 12 pounds since May 2017, he was congratulated for this.    Essential hypertension, benign  This is a chronic condition, stable and well controlled on current medications including triamterene hydrochlorothiazide. Blood pressure today is 122/80 and he denies symptoms of hypertension. He is up-to-date with labs, he will be due for repeat labs in 6 months, these have been ordered. He tolerates medications well with no significant bothersome side effects, he does need refills, these were called in for him.    Mixed hyperlipidemia  This is a  chronic condition, stable and fairly well controlled on atorvastatin 20 mg daily. His lipid profile is as follows:  Component      Latest Ref Rng & Units 10/31/2017           6:13 AM   Cholesterol,Tot      100 - 199 mg/dL 101   Triglycerides      0 - 149 mg/dL 166 (H)   HDL      >=40 mg/dL 34 (A)   LDL      <100 mg/dL 34   We discussed the importance of increasing physical activity to help improve the HDL. He is to continue with healthy low-fat, low-cholesterol diet, regular physical activity and continued efforts towards weight loss. He does need refills, these were called in for him. He tolerates them well with no significant bothersome side effects.    Obesity (BMI 30-39.9)  This is a chronic condition, uncontrolled. His weight is 213 pounds, BMI is 34.38. However he is down 12 pounds since his last visit with us in May 2017. He continues to ride his stationary bike on a regular basis and he tries to watch his diet.    Osteoarthritis of multiple joints  This is a chronic condition, stable and fairly well controlled with Lodine 500 mg twice a day. He tolerates this medication well with no significant bothersome side effects. He does need refills, these were called in for him.    Vitamin D deficiency  This is a chronic condition, stable and well-controlled with over-the-counter vitamin D supplementation. His last vitamin D level was within normal limits, he will be due again for labs in 6 months, these have been ordered.      No visits with results within 1 Month(s) from this visit.   Latest known visit with results is:   Hospital Outpatient Visit on 10/31/2017   Component Date Value   • Sodium 10/31/2017 141    • Potassium 10/31/2017 3.9    • Chloride 10/31/2017 105    • Co2 10/31/2017 28    • Anion Gap 10/31/2017 8.0    • Glucose 10/31/2017 95    • Bun 10/31/2017 19    • Creatinine 10/31/2017 1.11    • Calcium 10/31/2017 9.7    • AST(SGOT) 10/31/2017 19    • ALT(SGPT) 10/31/2017 20    • Alkaline Phosphatase  10/31/2017 50    • Total Bilirubin 10/31/2017 0.7    • Albumin 10/31/2017 4.1    • Total Protein 10/31/2017 7.1    • Globulin 10/31/2017 3.0    • A-G Ratio 10/31/2017 1.4    • Cholesterol,Tot 10/31/2017 101    • Triglycerides 10/31/2017 166*   • HDL 10/31/2017 34*   • LDL 10/31/2017 34    • Glycohemoglobin 10/31/2017 6.0*   • Est Avg Glucose 10/31/2017 126    • GFR If  10/31/2017 >60    • GFR If Non  Ameri* 10/31/2017 >60          clinical course has been stable    Past Medical History:   Diagnosis Date   • Anesthesia     2002-Wife reported pt flatlined after bronch. NO FURTHER PROBLEMS WITH FOLLOWING SURGERIES.    • Arm pain, left 1/7/2014   • Arthritis     general   • Asthma     Inhalers as needed   • Breath shortness 4/21/17    States only with exess exertion. No changes to status.   • CAD (coronary artery disease)     cad with an inconclusive thallium in 2004,  treated with medical management   • Cancer (CMS-HCC) 2002, 2006    Lung (2002)-surgery and radiation. Prostate (2006)-brachytherapy   • Carcinoma in situ of respiratory system 1998    Upper lung lobectomy and radiation therapy   • Cataract 2007    Bilateral phaco with IOL   • Colon polyps     NONCANCEROUS COLONIC POLYS 2004,MOST RECENT COLONOSCOPY 2005 WAS NEGATIVE   • Coronary atherosclerosis     CONTROLLED   • Diverticulitis     TREATED WITH HEMICOLECTOMY   • EKG abnormal 2000    ABN EKG W/OLD INFERIOR MI   • Emphysema of lung (CMS-HCC)     COPD   • Essential hypertension, benign 6/28/2013   • Essential hypertension, malignant     CONTROLLED   • Exposure     ORGANIC SOLVENT EXPOSURE IN THE 1980'S BUT HEAVY DOSES OF TYLENE AND XYLENE   • Hand pain, right 5/14/2010   • High cholesterol    • Lung cancer (CMS-HCC)     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, LEFT UPPER LOBE LOBECTOMY AND 6 WEEKS OF RADIATION IN 2001   • Mixed hyperlipidemia     CONTROLLED   • Nephrolithiasis 1967, 1968   • Osteoarthrosis involving, or with  "mention of more than one site, but not specified as generalized, multiple sites     CONTROLLED   • Pain 4/21/17    \"all over\"   • Personal history of malignant neoplasm of bronchus and lung     STABLE   • Personal history of malignant neoplasm of prostate     STABLE   • Pneumonia 2015    Also had previously   • Prediabetes 1/7/2014   • Prostate cancer (CMS-HCC) 2006    TREATED WITH BRACHY THERAPY   • Spinal fusion     C4-6, SPINAL FUSION PER DR JAY   • Unspecified vitamin D deficiency 5/14/2010       Past Surgical History:   Procedure Laterality Date   • KNEE ARTHROPLASTY TOTAL Left 5/5/2017    Procedure: KNEE ARTHROPLASTY TOTAL;  Surgeon: Ermias Griffith M.D.;  Location: SURGERY [facility];  Service:    • KNEE ARTHROSCOPY Right 2009    meniscus tear   • TRIGGER FINGER RELEASE Right 2009    index finger   • BICEPS TENDON REPAIR Right 2008   • CERVICAL DISK AND FUSION ANTERIOR  2008    C4-C5   • CATARACT PHACO WITH IOL Bilateral 2007   • BRACHY THERAPY  2006    Prostate CA   • KNEE ARTHROSCOPY Left 2003   • BRONCHOSCOPY  2002    Cancer   • LOBECTOMY Left 2002    Upper lung bronchial tube for CA   • SIGMOID COLECTOMY  2001    secondary to ruptured diverticulitis   • DENTAL EXTRACTION(S)  1969    wisdom teeth   • COLONOSCOPY  2001-present    every 3 years   • HERNIA REPAIR  2002, 2003    Abdominal incisional hernia, 2003 with mesh   • TONSILLECTOMY  as child       Family History   Problem Relation Age of Onset   • Adopted: Yes   • Diabetes Neg Hx        Ampicillin; Clindamycin; and Demerol    Current Outpatient Prescriptions Ordered in [facility]   Medication Sig Dispense Refill   • triamterene/hctz (MAXZIDE-25/DYAZIDE) 37.5-25 MG Cap Take 2 Caps by mouth every day. 180 Cap 3   • albuterol 108 (90 Base) MCG/ACT Aero Soln inhalation aerosol Inhale 2 Puffs by mouth every 6 hours as needed for Shortness of Breath. 1 Inhaler 3   • ipratropium (ATROVENT) 17 MCG/ACT Aero Soln Inhale 2 Puffs by mouth every 6 hours. 1 " "Inhaler 3   • atorvastatin (LIPITOR) 40 MG Tab Take 0.5 Tabs by mouth every bedtime. 45 Tab 3   • etodolac (LODINE) 500 MG tablet Take 1 Tab by mouth 2 times a day. 180 Tab 3   • Probiotic Product (PROBIOTIC DAILY PO) Take  by mouth every day.     • Simethicone (GAS RELIEF EXTRA STRENGTH PO) Take  by mouth as needed.     • Cholecalciferol (VITAMIN D3) 5000 UNITS Cap Take 1 Cap by mouth every day.     • ipratropium (ATROVENT) 0.02 % Solution 1 mL by Nebulization route 2 times a day. (Patient taking differently: 0.2 mg by Nebulization route 2 times a day as needed.) 60 Vial 0   • albuterol (PROVENTIL) 2.5mg/3ml NEBU solution for nebulization 3 mL by Nebulization route every four hours as needed for Shortness of Breath. 75 mL 3   • OCUVITE PO Take  by mouth every day. WITH ZINC        No current Epic-ordered facility-administered medications on file.        Constitutional ROS: No unexpected change in weight, No weakness, No unexplained fevers, sweats, or chills  Pulmonary ROS: Positive for COPD, per history of present illness  Cardiovascular ROS: No chest pain, No edema, No palpitations, Positive for hypertension, hyperlipidemia and coronary artery disease, per history of present illness  Gastrointestinal ROS: No abdominal pain, No nausea, vomiting, diarrhea, or constipation, no blood in stool  Musculoskeletal/Extremities ROS: Positive for osteoarthritis, on Lodine  Neurologic ROS: Normal development, No seizures, No weakness  Endocrine ROS: Positive per history of present illness    Physical exam:  /80   Pulse 76   Temp 37.3 °C (99.2 °F)   Resp 16   Ht 1.676 m (5' 6\")   Wt 96.6 kg (213 lb)   SpO2 96%   BMI 34.38 kg/m²   General Appearance: Elderly male, alert, no distress, obese, well-groomed  Skin: Skin color, texture, turgor normal. No rashes or lesions.  Lungs: negative findings: normal respiratory rate and rhythm, lungs clear to auscultation  Heart: negative. RRR without murmur, gallop, or rubs.  No " ectopy.  Abdomen: Abdomen soft, non-tender. BS normal. No masses,  No organomegaly  Musculoskeletal: negative findings: no evidence of joint instability, no evidence of muscle atrophy, no deformities present  Neurologic: intact, oriented, mood appropriate, judgment intact. Cranial nerves II-12 grossly intact    Medical decision making/discussion: Patient to follow-up with me in 6 months with labs before visit. All of his medications have been refilled, he is to take these as prescribed. He is continue with healthy diet, regular physical activity and continued efforts towards weight loss. He has declined all immunizations including flu despite discussed benefits versus risks of not having done.    Jamie was seen today for hypertension.    Diagnoses and all orders for this visit:    Coronary artery disease involving native coronary artery of native heart with angina pectoris (CMS-HCC)  -     LIPID PROFILE; Future  -     COMP METABOLIC PANEL; Future    Elevated fasting glucose  -     HEMOGLOBIN A1C; Future    Obesity (BMI 30-39.9)  -     Patient identified as having weight management issue.  Appropriate orders and counseling given.    Vitamin D deficiency  -     VITAMIN D,25 HYDROXY; Future    Mixed hyperlipidemia  -     LIPID PROFILE; Future  -     COMP METABOLIC PANEL; Future  -     ipratropium (ATROVENT) 17 MCG/ACT Aero Soln; Inhale 2 Puffs by mouth every 6 hours.  -     atorvastatin (LIPITOR) 40 MG Tab; Take 0.5 Tabs by mouth every bedtime.    Essential hypertension, benign  Comments:  Control, on medication  Orders:  -     triamterene/hctz (MAXZIDE-25/DYAZIDE) 37.5-25 MG Cap; Take 2 Caps by mouth every day.  -     ipratropium (ATROVENT) 17 MCG/ACT Aero Soln; Inhale 2 Puffs by mouth every 6 hours.    Essential hypertension, benign  Comments:  Controlled, continue with current medications and recheck in 6 months  Orders:  -     triamterene/hctz (MAXZIDE-25/DYAZIDE) 37.5-25 MG Cap; Take 2 Caps by mouth every day.  -      ipratropium (ATROVENT) 17 MCG/ACT Aero Soln; Inhale 2 Puffs by mouth every 6 hours.    Mixed hyperlipidemia  Comments:  Controlled, continues on statin  Orders:  -     LIPID PROFILE; Future  -     COMP METABOLIC PANEL; Future  -     ipratropium (ATROVENT) 17 MCG/ACT Aero Soln; Inhale 2 Puffs by mouth every 6 hours.  -     atorvastatin (LIPITOR) 40 MG Tab; Take 0.5 Tabs by mouth every bedtime.    Mixed hyperlipidemia  Comments:  Controlled, on statin  Orders:  -     LIPID PROFILE; Future  -     COMP METABOLIC PANEL; Future  -     ipratropium (ATROVENT) 17 MCG/ACT Aero Soln; Inhale 2 Puffs by mouth every 6 hours.  -     atorvastatin (LIPITOR) 40 MG Tab; Take 0.5 Tabs by mouth every bedtime.    Primary osteoarthritis involving multiple joints  Comments:  Uncontrolled, continues on anti-inflammatory  Orders:  -     etodolac (LODINE) 500 MG tablet; Take 1 Tab by mouth 2 times a day.    Pulmonary emphysema, unspecified emphysema type (CMS-[facility])  -     albuterol 108 (90 Base) MCG/ACT Aero Soln inhalation aerosol; Inhale 2 Puffs by mouth every 6 hours as needed for Shortness of Breath.          Please note that this dictation was created using voice recognition software. I have made every reasonable attempt to correct obvious errors, but I expect that there are errors of grammar and possibly content that I did not discover before finalizing the note.

## 2017-12-12 NOTE — ASSESSMENT & PLAN NOTE
This is a chronic condition, stable and well controlled on current medications including triamterene hydrochlorothiazide. Blood pressure today is 122/80 and he denies symptoms of hypertension. He is up-to-date with labs, he will be due for repeat labs in 6 months, these have been ordered. He tolerates medications well with no significant bothersome side effects, he does need refills, these were called in for him.

## 2017-12-12 NOTE — ASSESSMENT & PLAN NOTE
This is a chronic condition, stable on current medications. He has been followed by cardiology in past but he no longer sees cardiology. He recently saw a cardiologist in early 2017 to be cleared for knee surgery. He tolerates all of his medications well, he is on statin and triamterene hydrochlorothiazide. He has previously been on aspirin but stopped taking this, he does not get clear reason why he does not want to take this. I did encourage him to at least take a low dose 81 mg aspirin daily.

## 2017-12-12 NOTE — ASSESSMENT & PLAN NOTE
This is a chronic condition, stable and fairly well controlled with Lodine 500 mg twice a day. He tolerates this medication well with no significant bothersome side effects. He does need refills, these were called in for him.

## 2017-12-12 NOTE — ASSESSMENT & PLAN NOTE
This is a chronic condition, stable and well-controlled with over-the-counter vitamin D supplementation. His last vitamin D level was within normal limits, he will be due again for labs in 6 months, these have been ordered.

## 2017-12-12 NOTE — ASSESSMENT & PLAN NOTE
Patient has history of elevated fasting glucose, his most recent hemoglobin A1c was 6.0%. He continues to work on lifestyle modifications including healthy diet, regular physical activity and continued efforts towards weight loss. He is down 12 pounds since May 2017, he was congratulated for this.

## 2018-06-05 ENCOUNTER — HOSPITAL ENCOUNTER (OUTPATIENT)
Dept: LAB | Facility: MEDICAL CENTER | Age: 73
End: 2018-06-05
Attending: NURSE PRACTITIONER
Payer: MEDICARE

## 2018-06-05 DIAGNOSIS — E55.9 VITAMIN D DEFICIENCY: Chronic | ICD-10-CM

## 2018-06-05 DIAGNOSIS — I25.119 CORONARY ARTERY DISEASE INVOLVING NATIVE CORONARY ARTERY OF NATIVE HEART WITH ANGINA PECTORIS (HCC): ICD-10-CM

## 2018-06-05 DIAGNOSIS — E78.2 MIXED HYPERLIPIDEMIA: ICD-10-CM

## 2018-06-05 DIAGNOSIS — R73.01 ELEVATED FASTING GLUCOSE: ICD-10-CM

## 2018-06-05 LAB
25(OH)D3 SERPL-MCNC: 52 NG/ML (ref 30–100)
ALBUMIN SERPL BCP-MCNC: 4.3 G/DL (ref 3.2–4.9)
ALBUMIN/GLOB SERPL: 1.4 G/DL
ALP SERPL-CCNC: 53 U/L (ref 30–99)
ALT SERPL-CCNC: 22 U/L (ref 2–50)
ANION GAP SERPL CALC-SCNC: 11 MMOL/L (ref 0–11.9)
AST SERPL-CCNC: 21 U/L (ref 12–45)
BILIRUB SERPL-MCNC: 0.8 MG/DL (ref 0.1–1.5)
BUN SERPL-MCNC: 20 MG/DL (ref 8–22)
CALCIUM SERPL-MCNC: 9.8 MG/DL (ref 8.5–10.5)
CHLORIDE SERPL-SCNC: 104 MMOL/L (ref 96–112)
CHOLEST SERPL-MCNC: 112 MG/DL (ref 100–199)
CO2 SERPL-SCNC: 25 MMOL/L (ref 20–33)
CREAT SERPL-MCNC: 1.08 MG/DL (ref 0.5–1.4)
EST. AVERAGE GLUCOSE BLD GHB EST-MCNC: 128 MG/DL
GLOBULIN SER CALC-MCNC: 3 G/DL (ref 1.9–3.5)
GLUCOSE SERPL-MCNC: 112 MG/DL (ref 65–99)
HBA1C MFR BLD: 6.1 % (ref 0–5.6)
HDLC SERPL-MCNC: 36 MG/DL
LDLC SERPL CALC-MCNC: 47 MG/DL
POTASSIUM SERPL-SCNC: 3.8 MMOL/L (ref 3.6–5.5)
PROT SERPL-MCNC: 7.3 G/DL (ref 6–8.2)
SODIUM SERPL-SCNC: 140 MMOL/L (ref 135–145)
TRIGL SERPL-MCNC: 145 MG/DL (ref 0–149)

## 2018-06-05 PROCEDURE — 80053 COMPREHEN METABOLIC PANEL: CPT

## 2018-06-05 PROCEDURE — 36415 COLL VENOUS BLD VENIPUNCTURE: CPT

## 2018-06-05 PROCEDURE — 83036 HEMOGLOBIN GLYCOSYLATED A1C: CPT | Mod: GA

## 2018-06-05 PROCEDURE — 82306 VITAMIN D 25 HYDROXY: CPT

## 2018-06-05 PROCEDURE — 80061 LIPID PANEL: CPT

## 2018-06-12 ENCOUNTER — OFFICE VISIT (OUTPATIENT)
Dept: MEDICAL GROUP | Facility: PHYSICIAN GROUP | Age: 73
End: 2018-06-12
Payer: MEDICARE

## 2018-06-12 VITALS
TEMPERATURE: 98.4 F | RESPIRATION RATE: 18 BRPM | OXYGEN SATURATION: 95 % | HEART RATE: 92 BPM | DIASTOLIC BLOOD PRESSURE: 84 MMHG | SYSTOLIC BLOOD PRESSURE: 122 MMHG | BODY MASS INDEX: 36.29 KG/M2 | HEIGHT: 65 IN | WEIGHT: 217.8 LBS

## 2018-06-12 DIAGNOSIS — J43.9 PULMONARY EMPHYSEMA, UNSPECIFIED EMPHYSEMA TYPE (HCC): ICD-10-CM

## 2018-06-12 DIAGNOSIS — I25.119 CORONARY ARTERY DISEASE INVOLVING NATIVE CORONARY ARTERY OF NATIVE HEART WITH ANGINA PECTORIS (HCC): ICD-10-CM

## 2018-06-12 DIAGNOSIS — F32.1 CURRENT MODERATE EPISODE OF MAJOR DEPRESSIVE DISORDER WITHOUT PRIOR EPISODE (HCC): ICD-10-CM

## 2018-06-12 DIAGNOSIS — I10 ESSENTIAL HYPERTENSION, BENIGN: ICD-10-CM

## 2018-06-12 DIAGNOSIS — E66.9 OBESITY (BMI 35.0-39.9 WITHOUT COMORBIDITY): ICD-10-CM

## 2018-06-12 DIAGNOSIS — E55.9 VITAMIN D DEFICIENCY: Chronic | ICD-10-CM

## 2018-06-12 DIAGNOSIS — E78.2 MIXED HYPERLIPIDEMIA: ICD-10-CM

## 2018-06-12 DIAGNOSIS — R73.01 ELEVATED FASTING GLUCOSE: ICD-10-CM

## 2018-06-12 PROBLEM — F32.9 MAJOR DEPRESSIVE DISORDER: Status: ACTIVE | Noted: 2018-06-12

## 2018-06-12 PROCEDURE — 99214 OFFICE O/P EST MOD 30 MIN: CPT | Performed by: NURSE PRACTITIONER

## 2018-06-12 ASSESSMENT — PATIENT HEALTH QUESTIONNAIRE - PHQ9
SUM OF ALL RESPONSES TO PHQ QUESTIONS 1-9: 11
CLINICAL INTERPRETATION OF PHQ2 SCORE: 3
5. POOR APPETITE OR OVEREATING: 0 - NOT AT ALL

## 2018-06-12 NOTE — PATIENT INSTRUCTIONS
Follow up with me in 6 months, but need to bring wife in for office visit    Labs before your visit

## 2018-06-12 NOTE — PROGRESS NOTES
Chief Complaint   Patient presents with   • Hypertension     Fv labs         This is a 72 y.o.male patient that presents today with the following: Follow-up visit, review labs    COPD (chronic obstructive pulmonary disease)  This is a chronic condition, stable medications.  He is on multiple nebulizers and inhalers.  He has been followed by pulmonology in the past, he no longer sees them.  He has not had any recent COPD exacerbations.  He does not need refills at this time.    Elevated fasting glucose  This is a chronic condition, stable.  Hemoglobin A1c is 6.1%, mildly elevated from 6.0% back in December.  Discussed the importance of eating healthy, exercising regularly and continued efforts towards weight loss.  He does understand that he is at risk for developing type 2 diabetes.  We will recheck this again in 6 months.    Essential hypertension, benign  This is chronic, stable.  Blood pressure is 122/84 and he denies symptoms of hypertension.  He does not need refills on his medications at this time, but can call as needed.  He is up-to-date with labs.  He is to follow-up with me in 6 months.    Major depressive disorder  Patient scores an 11 on the PHQ-9 questionnaire today.  He is undergoing a lot of stress and depression surrounding his wife's decline, he believes her Alzheimer's is advancing.  He gets very little sleep due to her symptoms mostly occurring through the night.  He does not feel pharmacotherapy is needed at this time nor does he feel he needs therapy, thus he declines a referral to behavioral health.  He feels he can handle this on his own for now, he denies suicidal and homicidal ideations, hallucinations, racing thoughts and flights of ideas.    Mixed hyperlipidemia   this is a chronic condition, stable on medications.  Lipid profile is as follows:  Component      Latest Ref Rng & Units 6/5/2018           6:11 AM   Cholesterol,Tot      100 - 199 mg/dL 112   Triglycerides      0 - 149 mg/dL 145    HDL      >=40 mg/dL 36 (A)   LDL      <100 mg/dL 47   He is on atorvastatin 2, does not need refills at this time.  we will recheck labs again in 1 year    Obesity (BMI 35.0-39.9 without comorbidity) (HCC)  This is chronic, uncontrolled.  Patient's weight is 217 pounds, BMI is 36.24.  He does understand the risks associated with his weight and continues to work on this.  He states that not it is warmer he will be out riding his bike more often.  He does admit to eating very unhealthy over the past several months and he is going to make dietary changes.    Vitamin D deficiency  This is a chronic condition, stable and controlled with over-the-counter vitamin D supplements.  His last vitamin D level was within normal limits.    CAD (coronary artery disease), native coronary artery  Patient has history of CAD, stable on current medications.  He denies symptoms at this time.  He has been seen by cardiology in the past but no longer is followed by cardiologist.  He did however have a examination by cardiologist in 2017 to be cleared for knee surgery.       Hospital Outpatient Visit on 06/05/2018   Component Date Value   • Cholesterol,Tot 06/05/2018 112    • Triglycerides 06/05/2018 145    • HDL 06/05/2018 36*   • LDL 06/05/2018 47    • 25-Hydroxy   Vitamin D 25 06/05/2018 52    • Glycohemoglobin 06/05/2018 6.1*   • Est Avg Glucose 06/05/2018 128    • Sodium 06/05/2018 140    • Potassium 06/05/2018 3.8    • Chloride 06/05/2018 104    • Co2 06/05/2018 25    • Anion Gap 06/05/2018 11.0    • Glucose 06/05/2018 112*   • Bun 06/05/2018 20    • Creatinine 06/05/2018 1.08    • Calcium 06/05/2018 9.8    • AST(SGOT) 06/05/2018 21    • ALT(SGPT) 06/05/2018 22    • Alkaline Phosphatase 06/05/2018 53    • Total Bilirubin 06/05/2018 0.8    • Albumin 06/05/2018 4.3    • Total Protein 06/05/2018 7.3    • Globulin 06/05/2018 3.0    • A-G Ratio 06/05/2018 1.4    • GFR If  06/05/2018 >60    • GFR If Non  [place]*  "06/05/2018 >60          clinical course has been stable    Past Medical History:   Diagnosis Date   • Anesthesia     2002-Wife reported pt flatlined after bronch. NO FURTHER PROBLEMS WITH FOLLOWING SURGERIES.    • Arm pain, left 1/7/2014   • Arthritis     general   • Asthma     Inhalers as needed   • Breath shortness 4/21/17    States only with exess exertion. No changes to status.   • CAD (coronary artery disease)     cad with an inconclusive thallium in 2004,  treated with medical management   • Cancer (HCC) 2002, 2006    Lung (2002)-surgery and radiation. Prostate (2006)-brachytherapy   • Carcinoma in situ of respiratory system 1998    Upper lung lobectomy and radiation therapy   • Cataract 2007    Bilateral phaco with IOL   • Colon polyps     NONCANCEROUS COLONIC POLYS 2004,MOST RECENT COLONOSCOPY 2005 WAS NEGATIVE   • Coronary atherosclerosis     CONTROLLED   • Diverticulitis     TREATED WITH HEMICOLECTOMY   • EKG abnormal 2000    ABN EKG W/OLD INFERIOR MI   • Emphysema of lung (HCC)     COPD   • Essential hypertension, benign 6/28/2013   • Essential hypertension, malignant     CONTROLLED   • Exposure     ORGANIC SOLVENT EXPOSURE IN THE 1980'S BUT HEAVY DOSES OF TYLENE AND XYLENE   • Hand pain, right 5/14/2010   • High cholesterol    • Lung cancer (HCC)     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, LEFT UPPER LOBE LOBECTOMY AND 6 WEEKS OF RADIATION IN 2001   • Mixed hyperlipidemia     CONTROLLED   • Nephrolithiasis 1967, 1968   • Osteoarthrosis involving, or with mention of more than one site, but not specified as generalized, multiple sites     CONTROLLED   • Pain 4/21/17    \"all over\"   • Personal history of malignant neoplasm of bronchus and lung     STABLE   • Personal history of malignant neoplasm of prostate     STABLE   • Pneumonia 2015    Also had previously   • Prediabetes 1/7/2014   • Prostate cancer (HCC) 2006    TREATED WITH BRACHY THERAPY   • Spinal fusion     C4-6, SPINAL FUSION PER " PIERRE   • Unspecified vitamin D deficiency 5/14/2010       Past Surgical History:   Procedure Laterality Date   • KNEE ARTHROPLASTY TOTAL Left 5/5/2017    Procedure: KNEE ARTHROPLASTY TOTAL;  Surgeon: Ermias Griffith M.D.;  Location: SURGERY [facility];  Service:    • KNEE ARTHROSCOPY Right 2009    meniscus tear   • TRIGGER FINGER RELEASE Right 2009    index finger   • BICEPS TENDON REPAIR Right 2008   • CERVICAL DISK AND FUSION ANTERIOR  2008    C4-C5   • CATARACT PHACO WITH IOL Bilateral 2007   • BRACHY THERAPY  2006    Prostate CA   • KNEE ARTHROSCOPY Left 2003   • BRONCHOSCOPY  2002    Cancer   • LOBECTOMY Left 2002    Upper lung bronchial tube for CA   • SIGMOID COLECTOMY  2001    secondary to ruptured diverticulitis   • DENTAL EXTRACTION(S)  1969    wisdom teeth   • COLONOSCOPY  2001-present    every 3 years   • HERNIA REPAIR  2002, 2003    Abdominal incisional hernia, 2003 with mesh   • TONSILLECTOMY  as child       Family History   Problem Relation Age of Onset   • Adopted: Yes   • Diabetes Neg Hx        Ampicillin; Clindamycin; and Demerol    Current Outpatient Prescriptions Ordered in [facility]   Medication Sig Dispense Refill   • triamterene/hctz (MAXZIDE-25/DYAZIDE) 37.5-25 MG Cap Take 2 Caps by mouth every day. 180 Cap 3   • albuterol 108 (90 Base) MCG/ACT Aero Soln inhalation aerosol Inhale 2 Puffs by mouth every 6 hours as needed for Shortness of Breath. 1 Inhaler 3   • ipratropium (ATROVENT) 17 MCG/ACT Aero Soln Inhale 2 Puffs by mouth every 6 hours. 1 Inhaler 3   • atorvastatin (LIPITOR) 40 MG Tab Take 0.5 Tabs by mouth every bedtime. 45 Tab 3   • etodolac (LODINE) 500 MG tablet Take 1 Tab by mouth 2 times a day. 180 Tab 3   • Simethicone (GAS RELIEF EXTRA STRENGTH PO) Take  by mouth as needed.     • Cholecalciferol (VITAMIN D3) 5000 UNITS Cap Take 1 Cap by mouth every day.     • ipratropium (ATROVENT) 0.02 % Solution 1 mL by Nebulization route 2 times a day. (Patient taking differently: 0.2 mg  "by Nebulization route 2 times a day as needed.) 60 Vial 0   • albuterol (PROVENTIL) 2.5mg/3ml NEBU solution for nebulization 3 mL by Nebulization route every four hours as needed for Shortness of Breath. 75 mL 3   • OCUVITE PO Take  by mouth every day. WITH ZINC        No current Epic-ordered facility-administered medications on file.        Constitutional ROS: No unexpected change in weight, No fatigue, No unexplained fevers, sweats, or chills  Pulmonary ROS: Positive for COPD  Cardiovascular ROS: No chest pain, No edema, No palpitations, Positive for CAD, hypertension, hyperlipidemia  Gastrointestinal ROS: No abdominal pain, No nausea, vomiting, diarrhea, or constipation  Musculoskeletal/Extremities ROS: No clubbing, No peripheral edema, No pain, redness or swelling on the joints  Neurologic ROS: Normal development, No seizures, No weakness  Endocrine ROS: Positive per HPI    Physical exam:  /84   Pulse 92   Temp 36.9 °C (98.4 °F)   Resp 18   Ht 1.651 m (5' 5\")   Wt 98.8 kg (217 lb 12.8 oz)   SpO2 95%   BMI 36.24 kg/m²   General Appearance: Elderly male, alert, no distress, obese, well-groomed  Skin: Skin color, texture, turgor normal. No rashes or lesions.  Lungs: negative findings: normal respiratory rate and rhythm, lungs clear to auscultation  Heart: negative. RRR without murmur, gallop, or rubs.  No ectopy.  Abdomen: Abdomen soft, non-tender. BS normal. No masses,  No organomegaly  Musculoskeletal: negative findings: no evidence of joint instability, strength normal, no deformities present  Neurologic: intact, CN II through XII grossly intact    Medical decision making/discussion: Patient is to work on healthy diet, regular physical activity and continued efforts towards weight loss.  He is to have labs done before he follows up with me in 6 months.  He is to continue care per any specialist cc.    Jamie was seen today for hypertension.    Diagnoses and all orders for this visit:    Obesity (BMI " 35.0-39.9 without comorbidity)  -     Patient identified as having weight management issue.  Appropriate orders and counseling given.    Essential hypertension, benign    Elevated fasting glucose  -     HEMOGLOBIN A1C; Future    Vitamin D deficiency    Mixed hyperlipidemia    Pulmonary emphysema, unspecified emphysema type (HCC)    Current moderate episode of major depressive disorder without prior episode (HCC)  -     Patient has been identified as being depressed and appropriate orders and counseling have been given    Coronary artery disease involving native coronary artery of native heart with angina pectoris (HCC)          Please note that this dictation was created using voice recognition software. I have made every reasonable attempt to correct obvious errors, but I expect that there are errors of grammar and possibly content that I did not discover before finalizing the note.

## 2018-06-13 NOTE — ASSESSMENT & PLAN NOTE
this is a chronic condition, stable on medications.  Lipid profile is as follows:  Component      Latest Ref Rng & Units 6/5/2018           6:11 AM   Cholesterol,Tot      100 - 199 mg/dL 112   Triglycerides      0 - 149 mg/dL 145   HDL      >=40 mg/dL 36 (A)   LDL      <100 mg/dL 47   He is on atorvastatin 2, does not need refills at this time.  we will recheck labs again in 1 year

## 2018-06-13 NOTE — ASSESSMENT & PLAN NOTE
This is chronic, uncontrolled.  Patient's weight is 217 pounds, BMI is 36.24.  He does understand the risks associated with his weight and continues to work on this.  He states that not it is warmer he will be out riding his bike more often.  He does admit to eating very unhealthy over the past several months and he is going to make dietary changes.

## 2018-06-13 NOTE — ASSESSMENT & PLAN NOTE
This is a chronic condition, stable.  Hemoglobin A1c is 6.1%, mildly elevated from 6.0% back in December.  Discussed the importance of eating healthy, exercising regularly and continued efforts towards weight loss.  He does understand that he is at risk for developing type 2 diabetes.  We will recheck this again in 6 months.

## 2018-06-13 NOTE — ASSESSMENT & PLAN NOTE
This is chronic, stable.  Blood pressure is 122/84 and he denies symptoms of hypertension.  He does not need refills on his medications at this time, but can call as needed.  He is up-to-date with labs.  He is to follow-up with me in 6 months.

## 2018-06-13 NOTE — ASSESSMENT & PLAN NOTE
This is a chronic condition, stable and controlled with over-the-counter vitamin D supplements.  His last vitamin D level was within normal limits.

## 2018-06-13 NOTE — ASSESSMENT & PLAN NOTE
Patient scores an 11 on the PHQ-9 questionnaire today.  He is undergoing a lot of stress and depression surrounding his wife's decline, he believes her Alzheimer's is advancing.  He gets very little sleep due to her symptoms mostly occurring through the night.  He does not feel pharmacotherapy is needed at this time nor does he feel he needs therapy, thus he declines a referral to behavioral health.  He feels he can handle this on his own for now, he denies suicidal and homicidal ideations, hallucinations, racing thoughts and flights of ideas.

## 2018-06-13 NOTE — ASSESSMENT & PLAN NOTE
Patient has history of CAD, stable on current medications.  He denies symptoms at this time.  He has been seen by cardiology in the past but no longer is followed by cardiologist.  He did however have a examination by cardiologist in 2017 to be cleared for knee surgery.

## 2018-06-13 NOTE — ASSESSMENT & PLAN NOTE
This is a chronic condition, stable medications.  He is on multiple nebulizers and inhalers.  He has been followed by pulmonology in the past, he no longer sees them.  He has not had any recent COPD exacerbations.  He does not need refills at this time.

## 2018-11-20 ENCOUNTER — HOSPITAL ENCOUNTER (OUTPATIENT)
Dept: LAB | Facility: MEDICAL CENTER | Age: 73
End: 2018-11-20
Attending: NURSE PRACTITIONER
Payer: MEDICARE

## 2018-11-20 DIAGNOSIS — R73.01 ELEVATED FASTING GLUCOSE: ICD-10-CM

## 2018-11-20 LAB
EST. AVERAGE GLUCOSE BLD GHB EST-MCNC: 148 MG/DL
HBA1C MFR BLD: 6.8 % (ref 0–5.6)

## 2018-11-20 PROCEDURE — 36415 COLL VENOUS BLD VENIPUNCTURE: CPT | Mod: GA

## 2018-11-20 PROCEDURE — 83036 HEMOGLOBIN GLYCOSYLATED A1C: CPT | Mod: GA

## 2018-12-12 ENCOUNTER — OFFICE VISIT (OUTPATIENT)
Dept: MEDICAL GROUP | Facility: PHYSICIAN GROUP | Age: 73
End: 2018-12-12
Payer: MEDICARE

## 2018-12-12 VITALS
TEMPERATURE: 97.8 F | SYSTOLIC BLOOD PRESSURE: 130 MMHG | HEART RATE: 74 BPM | BODY MASS INDEX: 37.92 KG/M2 | OXYGEN SATURATION: 97 % | WEIGHT: 227.6 LBS | HEIGHT: 65 IN | DIASTOLIC BLOOD PRESSURE: 76 MMHG | RESPIRATION RATE: 16 BRPM

## 2018-12-12 DIAGNOSIS — M65.322 TRIGGER INDEX FINGER OF LEFT HAND: ICD-10-CM

## 2018-12-12 DIAGNOSIS — E11.8 TYPE 2 DIABETES MELLITUS WITH COMPLICATION, WITHOUT LONG-TERM CURRENT USE OF INSULIN (HCC): ICD-10-CM

## 2018-12-12 DIAGNOSIS — E66.9 OBESITY (BMI 30-39.9): ICD-10-CM

## 2018-12-12 DIAGNOSIS — I10 ESSENTIAL HYPERTENSION, BENIGN: ICD-10-CM

## 2018-12-12 DIAGNOSIS — E55.9 VITAMIN D DEFICIENCY: Chronic | ICD-10-CM

## 2018-12-12 DIAGNOSIS — E78.2 MIXED HYPERLIPIDEMIA: ICD-10-CM

## 2018-12-12 DIAGNOSIS — M15.9 PRIMARY OSTEOARTHRITIS INVOLVING MULTIPLE JOINTS: ICD-10-CM

## 2018-12-12 DIAGNOSIS — I25.119 CORONARY ARTERY DISEASE INVOLVING NATIVE CORONARY ARTERY OF NATIVE HEART WITH ANGINA PECTORIS (HCC): ICD-10-CM

## 2018-12-12 DIAGNOSIS — M25.532 LEFT WRIST PAIN: ICD-10-CM

## 2018-12-12 DIAGNOSIS — J43.9 PULMONARY EMPHYSEMA, UNSPECIFIED EMPHYSEMA TYPE (HCC): ICD-10-CM

## 2018-12-12 PROCEDURE — 99214 OFFICE O/P EST MOD 30 MIN: CPT | Performed by: NURSE PRACTITIONER

## 2018-12-12 RX ORDER — METFORMIN HYDROCHLORIDE 500 MG/1
500 TABLET, EXTENDED RELEASE ORAL 2 TIMES DAILY
Qty: 180 TAB | Refills: 3 | Status: SHIPPED | OUTPATIENT
Start: 2018-12-12 | End: 2020-04-07 | Stop reason: SDUPTHER

## 2018-12-12 RX ORDER — ATORVASTATIN CALCIUM 40 MG/1
20 TABLET, FILM COATED ORAL
Qty: 45 TAB | Refills: 3 | Status: SHIPPED | OUTPATIENT
Start: 2018-12-12 | End: 2019-12-30 | Stop reason: SDUPTHER

## 2018-12-12 RX ORDER — TRIAMTERENE AND HYDROCHLOROTHIAZIDE 37.5; 25 MG/1; MG/1
2 CAPSULE ORAL DAILY
Qty: 180 CAP | Refills: 3 | Status: SHIPPED | OUTPATIENT
Start: 2018-12-12 | End: 2019-02-04 | Stop reason: SDUPTHER

## 2018-12-12 RX ORDER — ETODOLAC 500 MG/1
500 TABLET, FILM COATED ORAL 2 TIMES DAILY
Qty: 180 TAB | Refills: 3 | Status: SHIPPED | OUTPATIENT
Start: 2018-12-12 | End: 2019-12-30 | Stop reason: SDUPTHER

## 2018-12-12 NOTE — ASSESSMENT & PLAN NOTE
Patient has history of vitamin D deficiency, he will be due for labs again in June 2019, these have been ordered.  He does continue to take an over-the-counter vitamin D supplement.

## 2018-12-12 NOTE — ASSESSMENT & PLAN NOTE
This is a chronic condition, stable and usually well controlled with inhalers including albuterol and ipratropium, he does not currently need refills.  He has been followed by pulmonology in the past but no longer sees a specialist and declines referral at this time.  I did discuss with him that if he finds he is using his inhalers more frequently we will need to consider follow-up with pulmonology.

## 2018-12-12 NOTE — ASSESSMENT & PLAN NOTE
The ASCVD Risk score (Herbiesoha SNOW Jr., et al., 2013) failed to calculate for the following reasons:    The valid total cholesterol range is 130 to 320 mg/dL  Component      Latest Ref Rng & Units 6/5/2018           6:11 AM   Cholesterol,Tot      100 - 199 mg/dL 112   Triglycerides      0 - 149 mg/dL 145   HDL      >=40 mg/dL 36 (A)   LDL      <100 mg/dL 47     Component      Latest Ref Rng & Units 6/5/2018           6:11 AM   Cholesterol,Tot      100 - 199 mg/dL 112   Triglycerides      0 - 149 mg/dL 145   HDL      >=40 mg/dL 36 (A)   LDL      <100 mg/dL 47     Component      Latest Ref Rng & Units 6/5/2018           6:11 AM   Cholesterol,Tot      100 - 199 mg/dL 112   Triglycerides      0 - 149 mg/dL 145   HDL      >=40 mg/dL 36 (A)   LDL      <100 mg/dL 47   Patient is on a atorvastatin 20 mg daily and will be due for labs again in June 2019, this has been ordered.  He does need refills, this was called in for him.

## 2018-12-12 NOTE — ASSESSMENT & PLAN NOTE
Patient has history of trigger finger of the left index finger that has previously been surgically treated.  He continues to have a bit of pain in this area but now has pain mostly in his left wrist and is requesting referral to hand specialist for further evaluation and possible treatment.

## 2018-12-12 NOTE — ASSESSMENT & PLAN NOTE
This is a chronic condition, stable and fairly well controlled on current medications including triamterene hydrochlorothiazide.  Blood pressure today is 130/76 and he denies symptoms of hypertension.  He does need refills, this was called in for him.  He will be due for labs again in June 2019.

## 2018-12-12 NOTE — ASSESSMENT & PLAN NOTE
Patient has lab results consistent with type 2 diabetes, hemoglobin A1c is now 6.8%, it was previously 6.1% about 6 months ago.  We discussed the importance of treating this to protect his end organs as well as decrease his cardiovascular risk.  He is agreeable to starting metformin extended release 500 mg twice daily.  He is appropriately on ACE inhibitor as well as statin.  We will recheck labs again in 3 months.

## 2018-12-12 NOTE — ASSESSMENT & PLAN NOTE
Patient has osteoarthritis of multiple joints for which he takes etodolac twice daily, he does need refills, this was called in for him.  He was advised to take this with food, stay well-hydrated, avoid other use of anti-inflammatories and avoid excessive salt in his diet.

## 2018-12-12 NOTE — PATIENT INSTRUCTIONS
Will have you start Metformin  mg twice a day--but take one a day for 1 week, then go to twice daily    Referral to hand surgeon    See me in 3 months with labs before visit

## 2018-12-12 NOTE — PROGRESS NOTES
Chief Complaint   Patient presents with   • Follow-Up     Labs          This is a 73 y.o.male patient that presents today with the following: Follow-up visit, review labs    Vitamin D deficiency  Patient has history of vitamin D deficiency, he will be due for labs again in June 2019, these have been ordered.  He does continue to take an over-the-counter vitamin D supplement.    Mixed hyperlipidemia  The ASCVD Risk score (Herbie SNOW Jr., et al., 2013) failed to calculate for the following reasons:    The valid total cholesterol range is 130 to 320 mg/dL  Component      Latest Ref Rng & Units 6/5/2018           6:11 AM   Cholesterol,Tot      100 - 199 mg/dL 112   Triglycerides      0 - 149 mg/dL 145   HDL      >=40 mg/dL 36 (A)   LDL      <100 mg/dL 47     Component      Latest Ref Rng & Units 6/5/2018           6:11 AM   Cholesterol,Tot      100 - 199 mg/dL 112   Triglycerides      0 - 149 mg/dL 145   HDL      >=40 mg/dL 36 (A)   LDL      <100 mg/dL 47     Component      Latest Ref Rng & Units 6/5/2018           6:11 AM   Cholesterol,Tot      100 - 199 mg/dL 112   Triglycerides      0 - 149 mg/dL 145   HDL      >=40 mg/dL 36 (A)   LDL      <100 mg/dL 47   Patient is on a atorvastatin 20 mg daily and will be due for labs again in June 2019, this has been ordered.  He does need refills, this was called in for him.    Osteoarthritis of multiple joints  Patient has osteoarthritis of multiple joints for which he takes etodolac twice daily, he does need refills, this was called in for him.  He was advised to take this with food, stay well-hydrated, avoid other use of anti-inflammatories and avoid excessive salt in his diet.    CAD (coronary artery disease), native coronary artery  Patient has history of coronary artery disease, stable on medications.  He has been followed by cardiology in the past but no longer sees specialist.  He declines referral at this time.    Essential hypertension, benign  This is a chronic condition,  stable and fairly well controlled on current medications including triamterene hydrochlorothiazide.  Blood pressure today is 130/76 and he denies symptoms of hypertension.  He does need refills, this was called in for him.  He will be due for labs again in June 2019.    COPD (chronic obstructive pulmonary disease)  This is a chronic condition, stable and usually well controlled with inhalers including albuterol and ipratropium, he does not currently need refills.  He has been followed by pulmonology in the past but no longer sees a specialist and declines referral at this time.  I did discuss with him that if he finds he is using his inhalers more frequently we will need to consider follow-up with pulmonology.    Obesity (BMI 30-39.9)  This is chronic, uncontrolled.  He continues to work on this by increasing physical activity and eating healthier diet.    Type 2 diabetes mellitus with complication, without long-term current use of insulin (HCC)  Patient has lab results consistent with type 2 diabetes, hemoglobin A1c is now 6.8%, it was previously 6.1% about 6 months ago.  We discussed the importance of treating this to protect his end organs as well as decrease his cardiovascular risk.  He is agreeable to starting metformin extended release 500 mg twice daily.  He is appropriately on ACE inhibitor as well as statin.  We will recheck labs again in 3 months.    Trigger index finger of left hand  Patient has history of trigger finger of the left index finger that has previously been surgically treated.  He continues to have a bit of pain in this area but now has pain mostly in his left wrist and is requesting referral to hand specialist for further evaluation and possible treatment.      Hospital Outpatient Visit on 11/20/2018   Component Date Value   • Glycohemoglobin 11/20/2018 6.8*   • Est Avg Glucose 11/20/2018 148          clinical course has been stable    Past Medical History:   Diagnosis Date   • Anesthesia   "   2002-Wife reported pt flatlined after bronch. NO FURTHER PROBLEMS WITH FOLLOWING SURGERIES.    • Arm pain, left 1/7/2014   • Arthritis     general   • Asthma     Inhalers as needed   • Breath shortness 4/21/17    States only with exess exertion. No changes to status.   • CAD (coronary artery disease)     cad with an inconclusive thallium in 2004,  treated with medical management   • Cancer (HCC) 2002, 2006    Lung (2002)-surgery and radiation. Prostate (2006)-brachytherapy   • Carcinoma in situ of respiratory system 1998    Upper lung lobectomy and radiation therapy   • Cataract 2007    Bilateral phaco with IOL   • Colon polyps     NONCANCEROUS COLONIC POLYS 2004,MOST RECENT COLONOSCOPY 2005 WAS NEGATIVE   • Coronary atherosclerosis     CONTROLLED   • Diverticulitis     TREATED WITH HEMICOLECTOMY   • EKG abnormal 2000    ABN EKG W/OLD INFERIOR MI   • Emphysema of lung (HCC)     COPD   • Essential hypertension, benign 6/28/2013   • Essential hypertension, malignant     CONTROLLED   • Exposure     ORGANIC SOLVENT EXPOSURE IN THE 1980'S BUT HEAVY DOSES OF TYLENE AND XYLENE   • Hand pain, right 5/14/2010   • High cholesterol    • Lung cancer (HCC)     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, LEFT UPPER LOBE LOBECTOMY AND 6 WEEKS OF RADIATION IN 2001   • Mixed hyperlipidemia     CONTROLLED   • Nephrolithiasis 1967, 1968   • Osteoarthrosis involving, or with mention of more than one site, but not specified as generalized, multiple sites     CONTROLLED   • Pain 4/21/17    \"all over\"   • Personal history of malignant neoplasm of bronchus and lung     STABLE   • Personal history of malignant neoplasm of prostate     STABLE   • Pneumonia 2015    Also had previously   • Prediabetes 1/7/2014   • Prostate cancer (HCC) 2006    TREATED WITH BRACHY THERAPY   • Spinal fusion     C4-6, SPINAL FUSION PER DR JAY   • Unspecified vitamin D deficiency 5/14/2010       Past Surgical History:   Procedure Laterality Date   • " KNEE ARTHROPLASTY TOTAL Left 5/5/2017    Procedure: KNEE ARTHROPLASTY TOTAL;  Surgeon: Ermias Griffith M.D.;  Location: SURGERY [facility];  Service:    • KNEE ARTHROSCOPY Right 2009    meniscus tear   • TRIGGER FINGER RELEASE Right 2009    index finger   • BICEPS TENDON REPAIR Right 2008   • CERVICAL DISK AND FUSION ANTERIOR  2008    C4-C5   • CATARACT PHACO WITH IOL Bilateral 2007   • BRACHY THERAPY  2006    Prostate CA   • KNEE ARTHROSCOPY Left 2003   • BRONCHOSCOPY  2002    Cancer   • LOBECTOMY Left 2002    Upper lung bronchial tube for CA   • SIGMOID COLECTOMY  2001    secondary to ruptured diverticulitis   • DENTAL EXTRACTION(S)  1969    wisdom teeth   • COLONOSCOPY  2001-present    every 3 years   • HERNIA REPAIR  2002, 2003    Abdominal incisional hernia, 2003 with mesh   • TONSILLECTOMY  as child       Family History   Problem Relation Age of Onset   • Adopted: Yes   • Diabetes Neg Hx        Ampicillin; Clindamycin; and Demerol    Current Outpatient Prescriptions Ordered in [facility]   Medication Sig Dispense Refill   • metFORMIN ER (GLUCOPHAGE XR) 500 MG TABLET SR 24 HR Take 1 Tab by mouth 2 times a day. 180 Tab 3   • triamterene/hctz (MAXZIDE-25/DYAZIDE) 37.5-25 MG Cap Take 2 Caps by mouth every day. 180 Cap 3   • atorvastatin (LIPITOR) 40 MG Tab Take 0.5 Tabs by mouth every bedtime. 45 Tab 3   • etodolac (LODINE) 500 MG tablet Take 1 Tab by mouth 2 times a day. 180 Tab 3   • Simethicone (GAS RELIEF EXTRA STRENGTH PO) Take  by mouth as needed.     • Cholecalciferol (VITAMIN D3) 5000 UNITS Cap Take 1 Cap by mouth every day.     • OCUVITE PO Take  by mouth every day. WITH ZINC      • albuterol 108 (90 Base) MCG/ACT Aero Soln inhalation aerosol Inhale 2 Puffs by mouth every 6 hours as needed for Shortness of Breath. 1 Inhaler 3   • ipratropium (ATROVENT) 17 MCG/ACT Aero Soln Inhale 2 Puffs by mouth every 6 hours. 1 Inhaler 3   • ipratropium (ATROVENT) 0.02 % Solution 1 mL by Nebulization route 2 times a  "day. (Patient taking differently: 0.2 mg by Nebulization route 2 times a day as needed.) 60 Vial 0   • albuterol (PROVENTIL) 2.5mg/3ml NEBU solution for nebulization 3 mL by Nebulization route every four hours as needed for Shortness of Breath. 75 mL 3     No current [facility]-ordered facility-administered medications on file.        Constitutional ROS: No unexpected change in weight, No weakness, No unexplained fevers, sweats, or chills  Pulmonary ROS: Positive per HPI  Cardiovascular ROS: No chest pain, No edema, No palpitations, Positive for CAD, hypertension, hyperlipidemia  Gastrointestinal ROS: No abdominal pain, No nausea, vomiting, diarrhea, or constipation  Musculoskeletal/Extremities ROS: Positive per HPI  Neurologic ROS: Normal development, No seizures, No weakness  Endocrine ROS: Positive per HPI    Physical exam:  /76   Pulse 74   Temp 36.6 °C (97.8 °F) (Temporal)   Resp 16   Ht 1.651 m (5' 5\")   Wt 103.2 kg (227 lb 9.6 oz)   SpO2 97%   BMI 37.87 kg/m²   General Appearance: Elderly male, alert, no distress, obese, well-groomed  Skin: Skin color, texture, turgor normal. No rashes or lesions.  Lungs: negative findings: normal respiratory rate and rhythm, lungs clear to auscultation  Heart: negative. RRR without murmur, gallop, or rubs.  No ectopy.  Abdomen: Abdomen soft, non-tender. BS normal. No masses,  No organomegaly  Musculoskeletal: positive findings: Limited range of motion to left wrist  Neurologic: intact, CN II through XII grossly intact    Medical decision making/discussion: Patient will start metformin extended release 500 mg twice daily, he was advised to take 1 a day for a week and then he can go to twice daily.  He has been referred to hand surgeon for further evaluation of chronic left wrist and hand pain.  He is to follow-up with me in 3 months with labs done before visit.  Other medications have been refilled.    Jamie was seen today for follow-up.    Diagnoses and all orders " for this visit:    Pulmonary emphysema, unspecified emphysema type ([facility])    Obesity (BMI 30-39.9)    Coronary artery disease involving native coronary artery of native heart with angina pectoris ([facility])    Vitamin D deficiency    Mixed hyperlipidemia  -     atorvastatin (LIPITOR) 40 MG Tab; Take 0.5 Tabs by mouth every bedtime.    Type 2 diabetes mellitus with complication, without long-term current use of insulin ([facility])  -     metFORMIN ER (GLUCOPHAGE XR) 500 MG TABLET SR 24 HR; Take 1 Tab by mouth 2 times a day.  -     HEMOGLOBIN A1C; Future  -     MICROALBUMIN CREAT RATIO URINE; Future    Trigger index finger of left hand  -     REFERRAL TO HAND SURGERY    Left wrist pain  -     REFERRAL TO HAND SURGERY    Essential hypertension, benign  Comments:  Control, on medication  Orders:  -     triamterene/hctz (MAXZIDE-25/DYAZIDE) 37.5-25 MG Cap; Take 2 Caps by mouth every day.    Primary osteoarthritis involving multiple joints  Comments:  Uncontrolled, continues on anti-inflammatory  Orders:  -     etodolac (LODINE) 500 MG tablet; Take 1 Tab by mouth 2 times a day.          Please note that this dictation was created using voice recognition software. I have made every reasonable attempt to correct obvious errors, but I expect that there are errors of grammar and possibly content that I did not discover before finalizing the note.

## 2018-12-12 NOTE — ASSESSMENT & PLAN NOTE
This is chronic, uncontrolled.  He continues to work on this by increasing physical activity and eating healthier diet.

## 2018-12-12 NOTE — ASSESSMENT & PLAN NOTE
Patient has history of coronary artery disease, stable on medications.  He has been followed by cardiology in the past but no longer sees specialist.  He declines referral at this time.

## 2019-01-13 ENCOUNTER — OFFICE VISIT (OUTPATIENT)
Dept: URGENT CARE | Facility: PHYSICIAN GROUP | Age: 74
End: 2019-01-13
Payer: MEDICARE

## 2019-01-13 ENCOUNTER — APPOINTMENT (OUTPATIENT)
Dept: RADIOLOGY | Facility: IMAGING CENTER | Age: 74
End: 2019-01-13
Attending: PHYSICIAN ASSISTANT
Payer: MEDICARE

## 2019-01-13 VITALS
HEART RATE: 90 BPM | SYSTOLIC BLOOD PRESSURE: 122 MMHG | WEIGHT: 216 LBS | OXYGEN SATURATION: 94 % | RESPIRATION RATE: 14 BRPM | BODY MASS INDEX: 35.94 KG/M2 | TEMPERATURE: 98.5 F | DIASTOLIC BLOOD PRESSURE: 80 MMHG

## 2019-01-13 DIAGNOSIS — R06.02 SOB (SHORTNESS OF BREATH): ICD-10-CM

## 2019-01-13 DIAGNOSIS — J22 LOWER RESP. TRACT INFECTION: ICD-10-CM

## 2019-01-13 DIAGNOSIS — R09.89 RHONCHI: ICD-10-CM

## 2019-01-13 DIAGNOSIS — J44.9 CHRONIC OBSTRUCTIVE PULMONARY DISEASE, UNSPECIFIED COPD TYPE (HCC): ICD-10-CM

## 2019-01-13 DIAGNOSIS — R06.2 WHEEZING: ICD-10-CM

## 2019-01-13 PROCEDURE — 71046 X-RAY EXAM CHEST 2 VIEWS: CPT | Mod: 26 | Performed by: PHYSICIAN ASSISTANT

## 2019-01-13 PROCEDURE — 99214 OFFICE O/P EST MOD 30 MIN: CPT | Performed by: PHYSICIAN ASSISTANT

## 2019-01-13 RX ORDER — ALBUTEROL SULFATE 2.5 MG/3ML
2.5 SOLUTION RESPIRATORY (INHALATION) EVERY 4 HOURS PRN
Qty: 75 ML | Refills: 3 | Status: SHIPPED | OUTPATIENT
Start: 2019-01-13

## 2019-01-13 RX ORDER — DOXYCYCLINE 100 MG/1
100 TABLET ORAL 2 TIMES DAILY
Qty: 20 TAB | Refills: 0 | Status: SHIPPED | OUTPATIENT
Start: 2019-01-13 | End: 2019-09-12

## 2019-01-13 RX ORDER — IPRATROPIUM BROMIDE AND ALBUTEROL SULFATE 2.5; .5 MG/3ML; MG/3ML
3 SOLUTION RESPIRATORY (INHALATION) ONCE
Status: COMPLETED | OUTPATIENT
Start: 2019-01-13 | End: 2019-01-13

## 2019-01-13 RX ORDER — CODEINE PHOSPHATE AND GUAIFENESIN 10; 100 MG/5ML; MG/5ML
5 SOLUTION ORAL EVERY 12 HOURS PRN
Qty: 100 ML | Refills: 0 | Status: SHIPPED | OUTPATIENT
Start: 2019-01-13 | End: 2019-01-23

## 2019-01-13 RX ORDER — PREDNISONE 10 MG/1
TABLET ORAL
Qty: 1 QUANTITY SUFFICIENT | Refills: 0 | Status: SHIPPED | OUTPATIENT
Start: 2019-01-13 | End: 2019-09-12

## 2019-01-13 RX ADMIN — IPRATROPIUM BROMIDE AND ALBUTEROL SULFATE 3 ML: 2.5; .5 SOLUTION RESPIRATORY (INHALATION) at 16:31

## 2019-01-14 NOTE — PROGRESS NOTES
Chief Complaint   Patient presents with   • Cough     x3d with difficulty breathing/ pt thinks poss Pneumonia       HISTORY OF PRESENT ILLNESS: Patient is a 73 y.o. male who presents today for the following:    Patient comes in for evaluation of a cough over the last few days.  He reports shortness of breath that is worse with exertion.  He does have chronic lung disease, history of lung cancer with history of lobectomy.  Patient is out of his nebulizer medication at home.  He denies fever, night sweats, chills, body aches, and any significant nasal congestion.  He is concerned about pneumonia.    Patient Active Problem List    Diagnosis Date Noted   • Type 2 diabetes mellitus with complication, without long-term current use of insulin ([facility]) 12/12/2018   • Trigger index finger of left hand 12/12/2018   • Left wrist pain 12/12/2018   • Major depressive disorder 06/12/2018   • Degenerative arthritis of left knee 05/05/2017   • Pain in lateral portion of left knee 04/28/2017   • Marital problems 12/08/2016   • Obesity (BMI 30-39.9) 12/08/2016   • Chronic right shoulder pain 12/03/2015   • Left hip pain 02/26/2015   • Functional constipation 02/26/2015   • Personal history of skin cancer 08/22/2014   • Elevated fasting glucose 01/07/2014   • COPD (chronic obstructive pulmonary disease) ([facility]) 01/07/2014   • Essential hypertension, benign 06/28/2013   • CAD (coronary artery disease), native coronary artery 05/19/2011   • Vitamin D deficiency 05/14/2010   • Mixed hyperlipidemia    • Personal history of malignant neoplasm of bronchus and lung    • Personal history of malignant neoplasm of prostate    • Osteoarthritis of multiple joints        Allergies:Ampicillin; Clindamycin; and Demerol    Current Outpatient Prescriptions Ordered in [facility]   Medication Sig Dispense Refill   • albuterol (PROVENTIL) 2.5mg/3ml Nebu Soln solution for nebulization 3 mL by Nebulization route every four hours as needed for Shortness of Breath. 75  mL 3   • ipratropium (ATROVENT) 0.02 % Solution 1 vial via nebulizer up to 4 times daily as needed for shortness of breath 75 Bullet 3   • predniSONE (DELTASONE) 10 MG Tab 50 mg x 1 day; 40 mg x 1 day; 30 mg x 1 day; 20 mg x 1 day; 10 mg x 1 day 1 Quantity Sufficient 0   • doxycycline monohydrate (ADOXA) 100 MG tablet Take 1 Tab by mouth 2 times a day. 20 Tab 0   • guaifenesin-codeine (ROBITUSSIN AC) Solution oral solution Take 5 mL by mouth every 12 hours as needed for Cough for up to 10 days. 100 mL 0   • metFORMIN ER (GLUCOPHAGE XR) 500 MG TABLET SR 24 HR Take 1 Tab by mouth 2 times a day. 180 Tab 3   • triamterene/hctz (MAXZIDE-25/DYAZIDE) 37.5-25 MG Cap Take 2 Caps by mouth every day. 180 Cap 3   • atorvastatin (LIPITOR) 40 MG Tab Take 0.5 Tabs by mouth every bedtime. 45 Tab 3   • etodolac (LODINE) 500 MG tablet Take 1 Tab by mouth 2 times a day. 180 Tab 3   • albuterol 108 (90 Base) MCG/ACT Aero Soln inhalation aerosol Inhale 2 Puffs by mouth every 6 hours as needed for Shortness of Breath. 1 Inhaler 3   • ipratropium (ATROVENT) 17 MCG/ACT Aero Soln Inhale 2 Puffs by mouth every 6 hours. 1 Inhaler 3   • Simethicone (GAS RELIEF EXTRA STRENGTH PO) Take  by mouth as needed.     • Cholecalciferol (VITAMIN D3) 5000 UNITS Cap Take 1 Cap by mouth every day.     • ipratropium (ATROVENT) 0.02 % Solution 1 mL by Nebulization route 2 times a day. (Patient taking differently: 0.2 mg by Nebulization route 2 times a day as needed.) 60 Vial 0   • OCUVITE PO Take  by mouth every day. WITH ZINC        No current [facility]-ordered facility-administered medications on file.        Past Medical History:   Diagnosis Date   • Anesthesia     2002-Wife reported pt flatlined after bronch. NO FURTHER PROBLEMS WITH FOLLOWING SURGERIES.    • Arm pain, left 1/7/2014   • Arthritis     general   • Asthma     Inhalers as needed   • Breath shortness 4/21/17    States only with exess exertion. No changes to status.   • CAD (coronary artery disease)  "    cad with an inconclusive thallium in 2004,  treated with medical management   • Cancer (HCC) 2002, 2006    Lung (2002)-surgery and radiation. Prostate (2006)-brachytherapy   • Carcinoma in situ of respiratory system 1998    Upper lung lobectomy and radiation therapy   • Cataract 2007    Bilateral phaco with IOL   • Colon polyps     NONCANCEROUS COLONIC POLYS 2004,MOST RECENT COLONOSCOPY 2005 WAS NEGATIVE   • Coronary atherosclerosis     CONTROLLED   • Diverticulitis     TREATED WITH HEMICOLECTOMY   • EKG abnormal 2000    ABN EKG W/OLD INFERIOR MI   • Emphysema of lung (HCC)     COPD   • Essential hypertension, benign 6/28/2013   • Essential hypertension, malignant     CONTROLLED   • Exposure     ORGANIC SOLVENT EXPOSURE IN THE 1980'S BUT HEAVY DOSES OF TYLENE AND XYLENE   • Hand pain, right 5/14/2010   • High cholesterol    • Lung cancer (HCC)     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, LEFT UPPER LOBE LOBECTOMY AND 6 WEEKS OF RADIATION IN 2001   • Mixed hyperlipidemia     CONTROLLED   • Nephrolithiasis 1967, 1968   • Osteoarthrosis involving, or with mention of more than one site, but not specified as generalized, multiple sites     CONTROLLED   • Pain 4/21/17    \"all over\"   • Personal history of malignant neoplasm of bronchus and lung     STABLE   • Personal history of malignant neoplasm of prostate     STABLE   • Pneumonia 2015    Also had previously   • Prediabetes 1/7/2014   • Prostate cancer (HCC) 2006    TREATED WITH BRACHY THERAPY   • Spinal fusion     C4-6, SPINAL FUSION PER DR JAY   • Unspecified vitamin D deficiency 5/14/2010       Social History   Substance Use Topics   • Smoking status: Former Smoker     Packs/day: 3.00     Years: 38.00     Types: Cigarettes     Quit date: 1/20/2001   • Smokeless tobacco: Never Used   • Alcohol use 0.0 oz/week      Comment: 2 per week       Family Status   Relation Status   • Mo Other        adopted no history   • Fa Other        adopted and no " history   • Neg Hx (Not Specified)     Family History   Problem Relation Age of Onset   • Adopted: Yes   • Diabetes Neg Hx        Review of Systems:   Constitutional ROS: No unexpected change in weight, No weakness, No fatigue  Eye ROS: No recent significant change in vision, No eye pain, redness, discharge  Ear ROS: No drainage, No tinnitus or vertigo, No recent change in hearing  Mouth/Throat ROS: No teeth or gum problems, No bleeding gums, No tongue complaints  Neck ROS: No swollen glands, No significant pain in neck  Cardiovascular ROS: No diaphoresis, No edema, No palpitations  Gastrointestinal ROS: No change in bowel habits, No significant change in appetite, No nausea, vomiting, diarrhea, or constipation  Musculoskeletal/Extremities ROS: No peripheral edema, No pain, redness or swelling on the joints  Hematologic/Lymphatic ROS: No chills, No night sweats, No weight loss  Skin/Integumentary ROS: No edema, No evidence of rash, No itching      Exam:  Blood pressure 122/80, pulse 90, temperature 36.9 °C (98.5 °F), temperature source Temporal, resp. rate 14, weight 98 kg (216 lb), SpO2 94 %.  General: Well developed, well nourished. No distress.  Eye: PERRL/EOMI; conjunctivae clear, lids normal.  ENMT: Lips without lesions, MMM. Oropharynx is clear. Bilateral TMs are within normal limits.  Neck: Trachea midline, no masses. No thyromegaly.  Pulmonary: Unlabored respiratory effort.  Diffuse inspiratory and expiratory coarse wheezes and rhonchi.  Cardiovascular: Regular rate and rhythm without murmur.    Neurologic: Grossly nonfocal. No facial asymmetry noted.  Lymph: No cervical lymphadenopathy noted.  Skin: Warm, dry, good turgor. No rashes in visible areas.   Psych: Normal mood. Alert and oriented x3. Judgment and insight is normal.    DuoNeb x1: patient is feeling a little bit better after the nebulizer treatment.  No significant change in breath sounds.    Chest x-ray, per radiology:  Impression       No active  disease     Assessment/Plan:  Discussed likely viral etiology versus COPD exacerbation.  Recommend starting with a steroid.  Start antibiotic if symptoms worsen after starting steroid.  Discussed appropriate over-the-counter symptomatic medication, and when to return to clinic. Follow up for worsening or persistent symptoms.  1. Lower resp. tract infection  doxycycline monohydrate (ADOXA) 100 MG tablet    guaifenesin-codeine (ROBITUSSIN AC) Solution oral solution   2. SOB (shortness of breath)  DX-CHEST-2 VIEWS    ipratropium (ATROVENT) 0.02 % Solution    ipratropium-albuterol (DUONEB) nebulizer solution    predniSONE (DELTASONE) 10 MG Tab   3. Chronic obstructive pulmonary disease, unspecified COPD type (HCC)  albuterol (PROVENTIL) 2.5mg/3ml Nebu Soln solution for nebulization    ipratropium (ATROVENT) 0.02 % Solution    ipratropium-albuterol (DUONEB) nebulizer solution   4. Wheezing  predniSONE (DELTASONE) 10 MG Tab   5. Rhonchi

## 2019-02-04 DIAGNOSIS — I10 ESSENTIAL HYPERTENSION, BENIGN: ICD-10-CM

## 2019-02-04 RX ORDER — TRIAMTERENE AND HYDROCHLOROTHIAZIDE 37.5; 25 MG/1; MG/1
2 CAPSULE ORAL DAILY
Qty: 180 CAP | Refills: 0 | Status: SHIPPED | OUTPATIENT
Start: 2019-02-04 | End: 2019-05-21 | Stop reason: SDUPTHER

## 2019-02-20 ENCOUNTER — HOSPITAL ENCOUNTER (OUTPATIENT)
Dept: LAB | Facility: MEDICAL CENTER | Age: 74
End: 2019-02-20
Attending: NURSE PRACTITIONER
Payer: MEDICARE

## 2019-02-20 DIAGNOSIS — E11.8 TYPE 2 DIABETES MELLITUS WITH COMPLICATION, WITHOUT LONG-TERM CURRENT USE OF INSULIN (HCC): ICD-10-CM

## 2019-02-20 LAB
CREAT UR-MCNC: 121.4 MG/DL
EST. AVERAGE GLUCOSE BLD GHB EST-MCNC: 126 MG/DL
HBA1C MFR BLD: 6 % (ref 0–5.6)
MICROALBUMIN UR-MCNC: <0.7 MG/DL
MICROALBUMIN/CREAT UR: NORMAL MG/G (ref 0–30)

## 2019-02-20 PROCEDURE — 36415 COLL VENOUS BLD VENIPUNCTURE: CPT | Mod: GA

## 2019-02-20 PROCEDURE — 82570 ASSAY OF URINE CREATININE: CPT

## 2019-02-20 PROCEDURE — 83036 HEMOGLOBIN GLYCOSYLATED A1C: CPT | Mod: GA

## 2019-02-20 PROCEDURE — 82043 UR ALBUMIN QUANTITATIVE: CPT

## 2019-02-27 ENCOUNTER — OFFICE VISIT (OUTPATIENT)
Dept: MEDICAL GROUP | Facility: PHYSICIAN GROUP | Age: 74
End: 2019-02-27
Payer: MEDICARE

## 2019-02-27 VITALS
DIASTOLIC BLOOD PRESSURE: 78 MMHG | SYSTOLIC BLOOD PRESSURE: 118 MMHG | RESPIRATION RATE: 16 BRPM | TEMPERATURE: 98.9 F | BODY MASS INDEX: 36.15 KG/M2 | WEIGHT: 217 LBS | OXYGEN SATURATION: 98 % | HEART RATE: 76 BPM | HEIGHT: 65 IN

## 2019-02-27 DIAGNOSIS — I25.119 CORONARY ARTERY DISEASE INVOLVING NATIVE CORONARY ARTERY OF NATIVE HEART WITH ANGINA PECTORIS (HCC): ICD-10-CM

## 2019-02-27 DIAGNOSIS — J43.9 PULMONARY EMPHYSEMA, UNSPECIFIED EMPHYSEMA TYPE (HCC): ICD-10-CM

## 2019-02-27 DIAGNOSIS — E78.2 MIXED HYPERLIPIDEMIA: ICD-10-CM

## 2019-02-27 DIAGNOSIS — E11.8 TYPE 2 DIABETES MELLITUS WITH COMPLICATION, WITHOUT LONG-TERM CURRENT USE OF INSULIN (HCC): ICD-10-CM

## 2019-02-27 DIAGNOSIS — I10 ESSENTIAL HYPERTENSION, BENIGN: ICD-10-CM

## 2019-02-27 DIAGNOSIS — F32.1 CURRENT MODERATE EPISODE OF MAJOR DEPRESSIVE DISORDER WITHOUT PRIOR EPISODE (HCC): ICD-10-CM

## 2019-02-27 PROCEDURE — 99214 OFFICE O/P EST MOD 30 MIN: CPT | Performed by: NURSE PRACTITIONER

## 2019-02-27 RX ORDER — ASPIRIN 81 MG/1
81 TABLET ORAL DAILY
Qty: 90 TAB | Refills: 3 | Status: SHIPPED | OUTPATIENT
Start: 2019-02-27 | End: 2020-02-26 | Stop reason: SDUPTHER

## 2019-02-27 NOTE — ASSESSMENT & PLAN NOTE
This is chronic and stable, well controlled on medications.  Blood pressure today is 118/78 and he denies symptoms of hypertension.  He will be due for repeat labs again in the summer, these have been ordered.  He does not need refills at this time.

## 2019-02-27 NOTE — ASSESSMENT & PLAN NOTE
Is a chronic condition, stable.  He does deny symptoms of chest pain, shortness of breath, near syncope, peripheral edema or diaphoresis.  He has been followed by cardiologist in the past but no longer see specialist.  He does continue to decline referral at this time.

## 2019-02-27 NOTE — ASSESSMENT & PLAN NOTE
The ASCVD Risk score (Herbie SNOW Jr., et al., 2013) failed to calculate for the following reasons:    The valid total cholesterol range is 130 to 320 mg/dL  Patient is on atorvastatin 40 mg daily.  He is due for labs again in the summer, these have been ordered.

## 2019-02-27 NOTE — PROGRESS NOTES
Chief Complaint   Patient presents with   • Diabetes     fv labs         This is a 73 y.o.male patient that presents today with the following: follow up, review labs    Mixed hyperlipidemia  The ASCVD Risk score (Knifley GWENDOLYN Jr., et al., 2013) failed to calculate for the following reasons:    The valid total cholesterol range is 130 to 320 mg/dL  Patient is on atorvastatin 40 mg daily.  He is due for labs again in the summer, these have been ordered.    CAD (coronary artery disease), native coronary artery  Is a chronic condition, stable.  He does deny symptoms of chest pain, shortness of breath, near syncope, peripheral edema or diaphoresis.  He has been followed by cardiologist in the past but no longer see specialist.  He does continue to decline referral at this time.    Essential hypertension, benign  This is chronic and stable, well controlled on medications.  Blood pressure today is 118/78 and he denies symptoms of hypertension.  He will be due for repeat labs again in the summer, these have been ordered.  He does not need refills at this time.    Type 2 diabetes mellitus with complication, without long-term current use of insulin (HCC)  This is chronic, stable and very well controlled on metformin 500 mg twice daily.  A1c back in November was 6.8%, it is now down to 6.0%.  He is appropriately on statin. He is due to monofilament exam and will get copy of his last retinal exam.     COPD (chronic obstructive pulmonary disease)  Chronic and stable, controlled with current medications. Has not has any recent exacerbations.      Hospital Outpatient Visit on 02/20/2019   Component Date Value   • Glycohemoglobin 02/20/2019 6.0*   • Est Avg Glucose 02/20/2019 126    • Creatinine, Urine 02/20/2019 121.40    • Microalbumin, Urine Rand* 02/20/2019 <0.7    • Micro Alb Creat Ratio 02/20/2019 see below          clinical course has been stable    Past Medical History:   Diagnosis Date   • Anesthesia     2002-Wife reported pt  "flatlined after bronch. NO FURTHER PROBLEMS WITH FOLLOWING SURGERIES.    • Arm pain, left 1/7/2014   • Arthritis     general   • Asthma     Inhalers as needed   • Breath shortness 4/21/17    States only with exess exertion. No changes to status.   • CAD (coronary artery disease)     cad with an inconclusive thallium in 2004,  treated with medical management   • Cancer (HCC) 2002, 2006    Lung (2002)-surgery and radiation. Prostate (2006)-brachytherapy   • Carcinoma in situ of respiratory system 1998    Upper lung lobectomy and radiation therapy   • Cataract 2007    Bilateral phaco with IOL   • Colon polyps     NONCANCEROUS COLONIC POLYS 2004,MOST RECENT COLONOSCOPY 2005 WAS NEGATIVE   • Coronary atherosclerosis     CONTROLLED   • Diverticulitis     TREATED WITH HEMICOLECTOMY   • EKG abnormal 2000    ABN EKG W/OLD INFERIOR MI   • Emphysema of lung (HCC)     COPD   • Essential hypertension, benign 6/28/2013   • Essential hypertension, malignant     CONTROLLED   • Exposure     ORGANIC SOLVENT EXPOSURE IN THE 1980'S BUT HEAVY DOSES OF TYLENE AND XYLENE   • Hand pain, right 5/14/2010   • High cholesterol    • Lung cancer (HCC)     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, LEFT UPPER LOBE LOBECTOMY AND 6 WEEKS OF RADIATION IN 2001   • Mixed hyperlipidemia     CONTROLLED   • Nephrolithiasis 1967, 1968   • Osteoarthrosis involving, or with mention of more than one site, but not specified as generalized, multiple sites     CONTROLLED   • Pain 4/21/17    \"all over\"   • Personal history of malignant neoplasm of bronchus and lung     STABLE   • Personal history of malignant neoplasm of prostate     STABLE   • Pneumonia 2015    Also had previously   • Prediabetes 1/7/2014   • Prostate cancer (HCC) 2006    TREATED WITH BRACHY THERAPY   • Spinal fusion     C4-6, SPINAL FUSION PER DR JAY   • Unspecified vitamin D deficiency 5/14/2010       Past Surgical History:   Procedure Laterality Date   • KNEE ARTHROPLASTY TOTAL " Left 5/5/2017    Procedure: KNEE ARTHROPLASTY TOTAL;  Surgeon: Ermias Griffith M.D.;  Location: SURGERY [facility];  Service:    • KNEE ARTHROSCOPY Right 2009    meniscus tear   • TRIGGER FINGER RELEASE Right 2009    index finger   • BICEPS TENDON REPAIR Right 2008   • CERVICAL DISK AND FUSION ANTERIOR  2008    C4-C5   • CATARACT PHACO WITH IOL Bilateral 2007   • BRACHY THERAPY  2006    Prostate CA   • KNEE ARTHROSCOPY Left 2003   • BRONCHOSCOPY  2002    Cancer   • LOBECTOMY Left 2002    Upper lung bronchial tube for CA   • SIGMOID COLECTOMY  2001    secondary to ruptured diverticulitis   • DENTAL EXTRACTION(S)  1969    wisdom teeth   • COLONOSCOPY  2001-present    every 3 years   • HERNIA REPAIR  2002, 2003    Abdominal incisional hernia, 2003 with mesh   • TONSILLECTOMY  as child       Family History   Problem Relation Age of Onset   • Adopted: Yes   • Diabetes Neg Hx        Ampicillin; Clindamycin; and Demerol    Current Outpatient Prescriptions Ordered in [facility]   Medication Sig Dispense Refill   • aspirin 81 MG EC tablet Take 1 Tab by mouth every day. 90 Tab 3   • triamterene/hctz (MAXZIDE-25/DYAZIDE) 37.5-25 MG Cap Take 2 Caps by mouth every day. 180 Cap 0   • albuterol (PROVENTIL) 2.5mg/3ml Nebu Soln solution for nebulization 3 mL by Nebulization route every four hours as needed for Shortness of Breath. 75 mL 3   • metFORMIN ER (GLUCOPHAGE XR) 500 MG TABLET SR 24 HR Take 1 Tab by mouth 2 times a day. 180 Tab 3   • atorvastatin (LIPITOR) 40 MG Tab Take 0.5 Tabs by mouth every bedtime. 45 Tab 3   • etodolac (LODINE) 500 MG tablet Take 1 Tab by mouth 2 times a day. 180 Tab 3   • albuterol 108 (90 Base) MCG/ACT Aero Soln inhalation aerosol Inhale 2 Puffs by mouth every 6 hours as needed for Shortness of Breath. 1 Inhaler 3   • ipratropium (ATROVENT) 17 MCG/ACT Aero Soln Inhale 2 Puffs by mouth every 6 hours. 1 Inhaler 3   • Simethicone (GAS RELIEF EXTRA STRENGTH PO) Take  by mouth as needed.     •  "Cholecalciferol (VITAMIN D3) 5000 UNITS Cap Take 1 Cap by mouth every day.     • OCUVITE PO Take  by mouth every day. WITH ZINC      • predniSONE (DELTASONE) 10 MG Tab 50 mg x 1 day; 40 mg x 1 day; 30 mg x 1 day; 20 mg x 1 day; 10 mg x 1 day 1 Quantity Sufficient 0   • doxycycline monohydrate (ADOXA) 100 MG tablet Take 1 Tab by mouth 2 times a day. 20 Tab 0     No current Epic-ordered facility-administered medications on file.        Constitutional ROS: No unexpected change in weight, No weakness, No unexplained fevers, sweats, or chills  Pulmonary ROS: No recent change in breathing, Positive for COPD, hx lung cancer  Cardiovascular ROS: No chest pain, No edema, No palpitations, Positive for hypertension, hyperlipidemia, CAD  Gastrointestinal ROS: No abdominal pain, No nausea, vomiting, diarrhea, or constipation  Musculoskeletal/Extremities ROS: No clubbing, No peripheral edema, No pain, redness or swelling on the joints  Neurologic ROS: Normal development, No seizures, No weakness  Endocrine ROS: positive per HPI    Physical exam:  /78 (BP Location: Right arm, Patient Position: Sitting, BP Cuff Size: Adult long)   Pulse 76   Temp 37.2 °C (98.9 °F) (Temporal)   Resp 16   Ht 1.651 m (5' 5\")   Wt 98.4 kg (217 lb)   SpO2 98%   BMI 36.11 kg/m²   General Appearance: pleasant elderly male, alert, no distress, obese, well groomed  Skin: Skin color, texture, turgor normal. No rashes or lesions.  Lungs: negative findings: normal respiratory rate and rhythm, lungs clear to auscultation  Heart: negative. RRR without murmur, gallop, or rubs.  No ectopy.  Abdomen: Abdomen soft, non-tender. BS normal. No masses,  No organomegaly  Musculoskeletal: negative findings: no evidence of joint instability, no evidence of muscle atrophy, no deformities present  Neurologic: intact  Diabetic Foot Exam: No ulcers, erythema or skin lesions present, patient tested with monofilament (10g) and tuning fork found to be sensitive " bilaterally throughout the ball of the foot, great toe and heel.      Medical decision making/discussion: pt to follow up with me in 6 months with labs before visit. Will get outside records containing his last retinal exam. He agrees to starting low dose ASA due to his hx of CAD.     Jamie was seen today for diabetes.    Diagnoses and all orders for this visit:    Type 2 diabetes mellitus with complication, without long-term current use of insulin (HCC)  -     Comp Metabolic Panel; Future  -     Lipid Profile; Future  -     HEMOGLOBIN A1C; Future  -     Diabetic Monofilament Lower Extremity Exam    Essential hypertension, benign  -     Comp Metabolic Panel; Future  -     Lipid Profile; Future    Mixed hyperlipidemia  -     Comp Metabolic Panel; Future  -     Lipid Profile; Future    Coronary artery disease involving native coronary artery of native heart with angina pectoris (HCC)  -     aspirin 81 MG EC tablet; Take 1 Tab by mouth every day.    Pulmonary emphysema, unspecified emphysema type (HCC)    Other orders  -     Obtain Results: Other (see comment), Retinal screening; Obtain Results From: Other (see comment)          Please note that this dictation was created using voice recognition software. I have made every reasonable attempt to correct obvious errors, but I expect that there are errors of grammar and possibly content that I did not discover before finalizing the note.

## 2019-02-27 NOTE — ASSESSMENT & PLAN NOTE
This is chronic and stable, much of his s/sx are related to his wife's declining health. Continues to decline referral to  and pharmacotherapy

## 2019-02-28 ENCOUNTER — PATIENT MESSAGE (OUTPATIENT)
Dept: MEDICAL GROUP | Facility: PHYSICIAN GROUP | Age: 74
End: 2019-02-28

## 2019-02-28 NOTE — TELEPHONE ENCOUNTER
From: Jamie Pacheco  To: DONNA Jarquin  Sent: 2/28/2019 2:58 AM PST  Subject: Prescription Question    Seriously - A baby aspirin. What is that for?

## 2019-05-21 DIAGNOSIS — I10 ESSENTIAL HYPERTENSION, BENIGN: ICD-10-CM

## 2019-05-22 RX ORDER — TRIAMTERENE AND HYDROCHLOROTHIAZIDE 37.5; 25 MG/1; MG/1
2 CAPSULE ORAL DAILY
Qty: 180 CAP | Refills: 1 | Status: SHIPPED
Start: 2019-05-22 | End: 2020-01-16

## 2019-05-22 NOTE — TELEPHONE ENCOUNTER
Refill X 6 months, sent to pharmacy.Pt. Seen in the last 6 months per protocol.   Lab Results   Component Value Date/Time    SODIUM 140 06/05/2018 06:11 AM    POTASSIUM 3.8 06/05/2018 06:11 AM    CHLORIDE 104 06/05/2018 06:11 AM    CO2 25 06/05/2018 06:11 AM    GLUCOSE 112 (H) 06/05/2018 06:11 AM    BUN 20 06/05/2018 06:11 AM    CREATININE 1.08 06/05/2018 06:11 AM    CREATININE 1.2 04/28/2009 08:45 AM

## 2019-08-28 ENCOUNTER — PATIENT MESSAGE (OUTPATIENT)
Dept: MEDICAL GROUP | Facility: PHYSICIAN GROUP | Age: 74
End: 2019-08-28

## 2019-08-28 NOTE — TELEPHONE ENCOUNTER
----- Message from DONNA Jarquin sent at 8/28/2019  9:16 AM PDT -----  Regarding: FW: Prescription Question  Contact: [phone]  Can we  Call pharmacy and find out what is going on with the triamterene?  Mya  ----- Message -----  From: Ray Dotson Ass't  Sent: 8/28/2019   8:57 AM PDT  To: DONNA Jarquin  Subject: FW: Prescription Question                            ----- Message -----  From: Jamie Pacheco  Sent: 8/28/2019   6:44 AM PDT  To: Rigo Toth  Subject: Prescription Question                            I have no idea what is going on with my meds. For some reason I cannot get a refrill for my Triam even though it shows that I have a refill.  I don't know what Inés has done with our meds., she has hers all goofed up and I guess it screws up mine.  I have 4 Triam pills left.  [facility] will not refill.  O Well What The Hell.  Don't care anyway.    pato

## 2019-08-28 NOTE — PATIENT COMMUNICATION
Phone Number Called: [phone]    Call outcome: Spoke to [facility].    Message: [facility] states that there is no issue with patient's medication at this time. Patient has picked up his 3 medications today 8/28/19.  triamterene/hctz (MAXZIDE-25/DYAZIDE) 37.5-25 MG Cap   aspirin 81 MG EC tablet   etodolac (LODINE) 500 MG tablet

## 2019-08-28 NOTE — TELEPHONE ENCOUNTER
----- Message from DONNA Jarquin sent at 8/28/2019  9:16 AM PDT -----  Regarding: FW: Prescription Question  Contact: [phone]  Can we  Call pharmacy and find out what is going on with the triamterene?  Mya  ----- Message -----  From: Ray Dotson Ass't  Sent: 8/28/2019   8:57 AM PDT  To: DONNA Jarquin  Subject: FW: Prescription Question                            ----- Message -----  From: Jamie Pacheco  Sent: 8/28/2019   6:44 AM PDT  To: Rigo Toth  Subject: Prescription Question                            I have no idea what is going on with my meds. For some reason I cannot get a refrill for my Triam even though it shows that I have a refill.  I don't know what Inés has done with our meds., she has hers all goofed up and I guess it screws up mine.  I have 4 Triam pills left.  [facility] will not refill.  O Well What The Hell.  Don't care anyway.    pato

## 2019-09-09 ENCOUNTER — HOSPITAL ENCOUNTER (OUTPATIENT)
Dept: LAB | Facility: MEDICAL CENTER | Age: 74
End: 2019-09-09
Attending: NURSE PRACTITIONER
Payer: MEDICARE

## 2019-09-09 DIAGNOSIS — E78.2 MIXED HYPERLIPIDEMIA: ICD-10-CM

## 2019-09-09 DIAGNOSIS — E11.8 TYPE 2 DIABETES MELLITUS WITH COMPLICATION, WITHOUT LONG-TERM CURRENT USE OF INSULIN (HCC): ICD-10-CM

## 2019-09-09 DIAGNOSIS — I10 ESSENTIAL HYPERTENSION, BENIGN: ICD-10-CM

## 2019-09-09 LAB
ALBUMIN SERPL BCP-MCNC: 4.6 G/DL (ref 3.2–4.9)
ALBUMIN/GLOB SERPL: 1.5 G/DL
ALP SERPL-CCNC: 48 U/L (ref 30–99)
ALT SERPL-CCNC: 30 U/L (ref 2–50)
ANION GAP SERPL CALC-SCNC: 11 MMOL/L (ref 0–11.9)
AST SERPL-CCNC: 22 U/L (ref 12–45)
BILIRUB SERPL-MCNC: 0.7 MG/DL (ref 0.1–1.5)
BUN SERPL-MCNC: 27 MG/DL (ref 8–22)
CALCIUM SERPL-MCNC: 9.7 MG/DL (ref 8.5–10.5)
CHLORIDE SERPL-SCNC: 103 MMOL/L (ref 96–112)
CHOLEST SERPL-MCNC: 116 MG/DL (ref 100–199)
CO2 SERPL-SCNC: 28 MMOL/L (ref 20–33)
CREAT SERPL-MCNC: 1.72 MG/DL (ref 0.5–1.4)
EST. AVERAGE GLUCOSE BLD GHB EST-MCNC: 114 MG/DL
FASTING STATUS PATIENT QL REPORTED: NORMAL
GLOBULIN SER CALC-MCNC: 3 G/DL (ref 1.9–3.5)
GLUCOSE SERPL-MCNC: 96 MG/DL (ref 65–99)
HBA1C MFR BLD: 5.6 % (ref 0–5.6)
HDLC SERPL-MCNC: 49 MG/DL
LDLC SERPL CALC-MCNC: 47 MG/DL
POTASSIUM SERPL-SCNC: 3.5 MMOL/L (ref 3.6–5.5)
PROT SERPL-MCNC: 7.6 G/DL (ref 6–8.2)
SODIUM SERPL-SCNC: 142 MMOL/L (ref 135–145)
TRIGL SERPL-MCNC: 102 MG/DL (ref 0–149)

## 2019-09-09 PROCEDURE — 36415 COLL VENOUS BLD VENIPUNCTURE: CPT | Mod: GA

## 2019-09-09 PROCEDURE — 83036 HEMOGLOBIN GLYCOSYLATED A1C: CPT | Mod: GA

## 2019-09-09 PROCEDURE — 80061 LIPID PANEL: CPT

## 2019-09-09 PROCEDURE — 80053 COMPREHEN METABOLIC PANEL: CPT

## 2019-09-12 ENCOUNTER — OFFICE VISIT (OUTPATIENT)
Dept: MEDICAL GROUP | Facility: PHYSICIAN GROUP | Age: 74
End: 2019-09-12
Payer: MEDICARE

## 2019-09-12 VITALS
HEIGHT: 65 IN | HEART RATE: 68 BPM | DIASTOLIC BLOOD PRESSURE: 82 MMHG | RESPIRATION RATE: 16 BRPM | OXYGEN SATURATION: 97 % | WEIGHT: 187 LBS | SYSTOLIC BLOOD PRESSURE: 124 MMHG | BODY MASS INDEX: 31.16 KG/M2 | TEMPERATURE: 98.6 F

## 2019-09-12 DIAGNOSIS — M54.9 UPPER BACK PAIN ON RIGHT SIDE: ICD-10-CM

## 2019-09-12 DIAGNOSIS — I10 ESSENTIAL HYPERTENSION, BENIGN: ICD-10-CM

## 2019-09-12 DIAGNOSIS — E11.8 TYPE 2 DIABETES MELLITUS WITH COMPLICATION, WITHOUT LONG-TERM CURRENT USE OF INSULIN (HCC): ICD-10-CM

## 2019-09-12 DIAGNOSIS — E78.2 MIXED HYPERLIPIDEMIA: ICD-10-CM

## 2019-09-12 DIAGNOSIS — L98.9 SKIN LESION: ICD-10-CM

## 2019-09-12 PROCEDURE — 99214 OFFICE O/P EST MOD 30 MIN: CPT | Performed by: NURSE PRACTITIONER

## 2019-09-12 SDOH — HEALTH STABILITY: MENTAL HEALTH: HOW MANY STANDARD DRINKS CONTAINING ALCOHOL DO YOU HAVE ON A TYPICAL DAY?: 1 OR 2

## 2019-09-12 SDOH — HEALTH STABILITY: MENTAL HEALTH: HOW OFTEN DO YOU HAVE 6 OR MORE DRINKS ON ONE OCCASION?: NEVER

## 2019-09-12 SDOH — HEALTH STABILITY: MENTAL HEALTH: HOW OFTEN DO YOU HAVE A DRINK CONTAINING ALCOHOL?: MONTHLY OR LESS

## 2019-09-12 ASSESSMENT — PATIENT HEALTH QUESTIONNAIRE - PHQ9
5. POOR APPETITE OR OVEREATING: NOT AT ALL
4. FEELING TIRED OR HAVING LITTLE ENERGY: NOT AT ALL
1. LITTLE INTEREST OR PLEASURE IN DOING THINGS: NOT AT ALL
9. THOUGHTS THAT YOU WOULD BE BETTER OFF DEAD, OR OF HURTING YOURSELF: NOT AT ALL
8. MOVING OR SPEAKING SO SLOWLY THAT OTHER PEOPLE COULD HAVE NOTICED. OR THE OPPOSITE, BEING SO FIGETY OR RESTLESS THAT YOU HAVE BEEN MOVING AROUND A LOT MORE THAN USUAL: NOT AT ALL
7. TROUBLE CONCENTRATING ON THINGS, SUCH AS READING THE NEWSPAPER OR WATCHING TELEVISION: NOT AT ALL
2. FEELING DOWN, DEPRESSED, IRRITABLE, OR HOPELESS: NOT AT ALL
SUM OF ALL RESPONSES TO PHQ QUESTIONS 1-9: 0
6. FEELING BAD ABOUT YOURSELF - OR THAT YOU ARE A FAILURE OR HAVE LET YOURSELF OR YOUR FAMILY DOWN: NOT AL ALL
3. TROUBLE FALLING OR STAYING ASLEEP OR SLEEPING TOO MUCH: NOT AT ALL
SUM OF ALL RESPONSES TO PHQ9 QUESTIONS 1 AND 2: 0

## 2019-09-12 ASSESSMENT — PAIN SCALES - GENERAL: PAINLEVEL: NO PAIN

## 2019-09-12 NOTE — PROGRESS NOTES
Chief Complaint   Patient presents with   • Diabetes   • Other     declines immunizations         This is a 73 y.o.male patient that presents today with the following: follow up, review labs    Type 2 diabetes mellitus with complication, without long-term current use of insulin (HCC)  This is a chronic condition, stable and very well controlled on metformin extended release 500 mg twice daily.  A1c is 5.6%.  He is appropriately on statin but not on ACE or arm.  He is interested in decreasing dose of metformin, we will decrease this down to once a day and recheck labs again in 6 months.    Mixed hyperlipidemia  The ASCVD Risk score (Herbie GWENDOLYN Jr, et al., 2013) failed to calculate.  Patient is on statin, he takes atorvastatin 40 mg daily, up-to-date with labs.    Essential hypertension, benign  This is a chronic condition, stable and very well controlled on current medications.  He did have mildly decreased kidney function, discussed with him various causes diuretics being 1 of them.  His blood pressure has been very well controlled and he is interested in decreasing dose of blood pressure medication.  We will have him cut down to 1 pill/day and Tenormin in a couple weeks for blood pressure check as well as repeat lab.    Skin lesion  Patient has skin lesions previously monitored by dermatology, they are located on his face.  He would like referral to new dermatologist for ongoing evaluation.    Upper back pain on right side  Patient is interested in referral to physical therapy for chronic upper back pain on the right side that he believes muscle spasms.  Referral to physical therapy has been placed.      Hospital Outpatient Visit on 09/09/2019   Component Date Value   • Glycohemoglobin 09/09/2019 5.6    • Est Avg Glucose 09/09/2019 114    • Cholesterol,Tot 09/09/2019 116    • Triglycerides 09/09/2019 102    • HDL 09/09/2019 49    • LDL 09/09/2019 47    • Sodium 09/09/2019 142    • Potassium 09/09/2019 3.5*   •  Chloride 09/09/2019 103    • Co2 09/09/2019 28    • Anion Gap 09/09/2019 11.0    • Glucose 09/09/2019 96    • Bun 09/09/2019 27*   • Creatinine 09/09/2019 1.72*   • Calcium 09/09/2019 9.7    • AST(SGOT) 09/09/2019 22    • ALT(SGPT) 09/09/2019 30    • Alkaline Phosphatase 09/09/2019 48    • Total Bilirubin 09/09/2019 0.7    • Albumin 09/09/2019 4.6    • Total Protein 09/09/2019 7.6    • Globulin 09/09/2019 3.0    • A-G Ratio 09/09/2019 1.5    • Fasting Status 09/09/2019 Fasting    • GFR If  09/09/2019 48*   • GFR If Non  Ameri* 09/09/2019 39*         clinical course has been stable    Past Medical History:   Diagnosis Date   • Anesthesia     2002-Wife reported pt flatlined after bronch. NO FURTHER PROBLEMS WITH FOLLOWING SURGERIES.    • Arm pain, left 1/7/2014   • Arthritis     general   • Asthma     Inhalers as needed   • Breath shortness 4/21/17    States only with exess exertion. No changes to status.   • CAD (coronary artery disease)     cad with an inconclusive thallium in 2004,  treated with medical management   • Cancer (HCC) 2002, 2006    Lung (2002)-surgery and radiation. Prostate (2006)-brachytherapy   • Carcinoma in situ of respiratory system 1998    Upper lung lobectomy and radiation therapy   • Cataract 2007    Bilateral phaco with IOL   • Colon polyps     NONCANCEROUS COLONIC POLYS 2004,MOST RECENT COLONOSCOPY 2005 WAS NEGATIVE   • Coronary atherosclerosis     CONTROLLED   • Diverticulitis     TREATED WITH HEMICOLECTOMY   • EKG abnormal 2000    ABN EKG W/OLD INFERIOR MI   • Emphysema of lung (HCC)     COPD   • Essential hypertension, benign 6/28/2013   • Essential hypertension, malignant     CONTROLLED   • Exposure     ORGANIC SOLVENT EXPOSURE IN THE 1980'S BUT HEAVY DOSES OF TYLENE AND XYLENE   • Hand pain, right 5/14/2010   • High cholesterol    • Lung cancer (HCC)     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, LEFT UPPER LOBE LOBECTOMY AND 6 WEEKS OF RADIATION IN 2001  "  • Mixed hyperlipidemia     CONTROLLED   • Nephrolithiasis 1967, 1968   • Osteoarthrosis involving, or with mention of more than one site, but not specified as generalized, multiple sites     CONTROLLED   • Pain 4/21/17    \"all over\"   • Personal history of malignant neoplasm of bronchus and lung     STABLE   • Personal history of malignant neoplasm of prostate     STABLE   • Pneumonia 2015    Also had previously   • Prediabetes 1/7/2014   • Prostate cancer (HCC) 2006    TREATED WITH BRACHY THERAPY   • Spinal fusion     C4-6, SPINAL FUSION PER DR JAY   • Unspecified vitamin D deficiency 5/14/2010       Past Surgical History:   Procedure Laterality Date   • KNEE ARTHROPLASTY TOTAL Left 5/5/2017    Procedure: KNEE ARTHROPLASTY TOTAL;  Surgeon: Ermias Griffith M.D.;  Location: SURGERY [facility];  Service:    • KNEE ARTHROSCOPY Right 2009    meniscus tear   • TRIGGER FINGER RELEASE Right 2009    index finger   • BICEPS TENDON REPAIR Right 2008   • CERVICAL DISK AND FUSION ANTERIOR  2008    C4-C5   • CATARACT PHACO WITH IOL Bilateral 2007   • BRACHY THERAPY  2006    Prostate CA   • KNEE ARTHROSCOPY Left 2003   • BRONCHOSCOPY  2002    Cancer   • LOBECTOMY Left 2002    Upper lung bronchial tube for CA   • SIGMOID COLECTOMY  2001    secondary to ruptured diverticulitis   • DENTAL EXTRACTION(S)  1969    wisdom teeth   • COLONOSCOPY  2001-present    every 3 years   • HERNIA REPAIR  2002, 2003    Abdominal incisional hernia, 2003 with mesh   • TONSILLECTOMY  as child       Family History   Adopted: Yes   Problem Relation Age of Onset   • Diabetes Neg Hx        Ampicillin; Clindamycin; and Demerol    Current Outpatient Medications Ordered in Epic   Medication Sig Dispense Refill   • triamterene/hctz (MAXZIDE-25/DYAZIDE) 37.5-25 MG Cap Take 2 Caps by mouth every day. (Patient taking differently: Take 1 Cap by mouth every day.) 180 Cap 1   • aspirin 81 MG EC tablet Take 1 Tab by mouth every day. 90 Tab 3   • " "albuterol (PROVENTIL) 2.5mg/3ml Nebu Soln solution for nebulization 3 mL by Nebulization route every four hours as needed for Shortness of Breath. 75 mL 3   • metFORMIN ER (GLUCOPHAGE XR) 500 MG TABLET SR 24 HR Take 1 Tab by mouth 2 times a day. (Patient taking differently: Take 500 mg by mouth every day.) 180 Tab 3   • atorvastatin (LIPITOR) 40 MG Tab Take 0.5 Tabs by mouth every bedtime. 45 Tab 3   • etodolac (LODINE) 500 MG tablet Take 1 Tab by mouth 2 times a day. 180 Tab 3   • albuterol 108 (90 Base) MCG/ACT Aero Soln inhalation aerosol Inhale 2 Puffs by mouth every 6 hours as needed for Shortness of Breath. 1 Inhaler 3   • ipratropium (ATROVENT) 17 MCG/ACT Aero Soln Inhale 2 Puffs by mouth every 6 hours. 1 Inhaler 3   • Simethicone (GAS RELIEF EXTRA STRENGTH PO) Take  by mouth as needed.     • Cholecalciferol (VITAMIN D3) 5000 UNITS Cap Take 1 Cap by mouth every day.     • OCUVITE PO Take  by mouth every day. WITH ZINC        No current Epic-ordered facility-administered medications on file.        Constitutional ROS: No unexpected change in weight, No weakness, No unexplained fevers, sweats, or chills  Pulmonary ROS: positive for COPD  Cardiovascular ROS: No chest pain, No edema, No palpitations. Positive for HTN and hyperlipidemia  Gastrointestinal ROS: No abdominal pain, No nausea, vomiting, diarrhea, or constipation  Musculoskeletal/Extremities ROS: positive per HPI  Neurologic ROS: Normal development, No seizures, No weakness  Endocrine ROS: positive per HPI    Physical exam:  /82 (BP Location: Right arm, Patient Position: Sitting, BP Cuff Size: Adult)   Pulse 68   Temp 37 °C (98.6 °F) (Temporal)   Resp 16   Ht 1.651 m (5' 5\")   Wt 84.8 kg (187 lb)   SpO2 97%   BMI 31.12 kg/m²   General Appearance: pleasant elderly male, alert, no distress, obese, well groomed  Skin: Skin color, texture, turgor normal. No rashes or lesions.  Lungs: negative findings: normal respiratory rate and rhythm, lungs " clear to auscultation  Heart: negative. RRR without murmur, gallop, or rubs.  No ectopy.  Abdomen: Abdomen soft, non-tender. BS normal. No masses,  No organomegaly  Musculoskeletal: negative findings: no evidence of joint instability, no evidence of muscle atrophy, no deformities present  Neurologic: intact, C 2-12 grossly intact    Medical decision making/discussion: referral placed to dermatology per pt's request.  He is also requesting referral to physical therapy for his chronic right upper back pain, referral placed.  He is going to cut down on his metformin to 500 mg once a day as well as his blood pressure medication, he will take this once a day.  He is going to have a repeat lab in 2 weeks as well as a blood pressure check.  He is otherwise follow-up with me in 3-4 months    Jamie was seen today for diabetes and other.    Diagnoses and all orders for this visit:    Essential hypertension, benign  -     Basic Metabolic Panel; Future    Mixed hyperlipidemia    Type 2 diabetes mellitus with complication, without long-term current use of insulin (HCC)  -     HEMOGLOBIN A1C; Future    Skin lesion  -     REFERRAL TO DERMATOLOGY    Upper back pain on right side  -     REFERRAL TO PHYSICAL THERAPY Reason for Therapy: Eval/Treat/Report        Return in about 4 months (around 1/12/2020) for Follow-up, Discuss Labs.        Please note that this dictation was created using voice recognition software. I have made every reasonable attempt to correct obvious errors, but I expect that there are errors of grammar and possibly content that I did not discover before finalizing the note.

## 2019-09-12 NOTE — PATIENT INSTRUCTIONS
Drop to metformin to once a day    Cut the BP med (triamterene/HCTZ) to one pill a day    Repeat the lab in 2 weeks, when you come in for that lab, BP check with MA    See me in 3-4 months, sooner if needed

## 2019-09-14 PROBLEM — M54.9 UPPER BACK PAIN ON RIGHT SIDE: Status: ACTIVE | Noted: 2019-09-14

## 2019-09-14 PROBLEM — L98.9 SKIN LESION: Status: ACTIVE | Noted: 2019-09-14

## 2019-09-14 NOTE — ASSESSMENT & PLAN NOTE
Patient is interested in referral to physical therapy for chronic upper back pain on the right side that he believes muscle spasms.  Referral to physical therapy has been placed.

## 2019-09-14 NOTE — ASSESSMENT & PLAN NOTE
The ASCVD Risk score (Herbiesoha SNOW Jr, et al., 2013) failed to calculate.  Patient is on statin, he takes atorvastatin 40 mg daily, up-to-date with labs.

## 2019-09-14 NOTE — ASSESSMENT & PLAN NOTE
Patient has skin lesions previously monitored by dermatology, they are located on his face.  He would like referral to new dermatologist for ongoing evaluation.

## 2019-09-14 NOTE — ASSESSMENT & PLAN NOTE
This is a chronic condition, stable and very well controlled on current medications.  He did have mildly decreased kidney function, discussed with him various causes diuretics being 1 of them.  His blood pressure has been very well controlled and he is interested in decreasing dose of blood pressure medication.  We will have him cut down to 1 pill/day and Tenormin in a couple weeks for blood pressure check as well as repeat lab.

## 2019-09-14 NOTE — ASSESSMENT & PLAN NOTE
This is a chronic condition, stable and very well controlled on metformin extended release 500 mg twice daily.  A1c is 5.6%.  He is appropriately on statin but not on ACE or arm.  He is interested in decreasing dose of metformin, we will decrease this down to once a day and recheck labs again in 6 months.

## 2019-09-19 ENCOUNTER — PATIENT MESSAGE (OUTPATIENT)
Dept: MEDICAL GROUP | Facility: PHYSICIAN GROUP | Age: 74
End: 2019-09-19

## 2019-09-19 NOTE — TELEPHONE ENCOUNTER
----- Message from Jamie Pacheco sent at 9/19/2019  5:04 AM PDT -----  Regarding: Non-Urgent Medical Question  Contact: [phone]  Good morning Dr Sullivan  Not sure how or even if this is going to work.  Somehow a visit was scheduled for Inés that she had an appointment scheduled for today with you.  I had to cancel it because she has a follow up at the same time with the eye surgeon for a follow up from yesterdays eye surgery.  I re- scheduled Inés for 11:40 on the 26th to see you.   I don't know how how to get her an appointment for blood work on the 23rd.  Not sure if I can do all of this.  Please let me know if this will work.    pato

## 2019-09-19 NOTE — PATIENT COMMUNICATION
Phone Number Called: [phone] (home)       Call outcome: left message for patient to call back regarding message below    Message: left voice message for patient to schedule lab appointment at [phone]-also shannon [facility] lab day and hours..

## 2019-09-23 ENCOUNTER — NON-PROVIDER VISIT (OUTPATIENT)
Dept: MEDICAL GROUP | Facility: PHYSICIAN GROUP | Age: 74
End: 2019-09-23
Payer: MEDICARE

## 2019-09-23 VITALS
BODY MASS INDEX: 31.16 KG/M2 | SYSTOLIC BLOOD PRESSURE: 122 MMHG | DIASTOLIC BLOOD PRESSURE: 78 MMHG | HEIGHT: 65 IN | WEIGHT: 187 LBS

## 2019-09-23 ASSESSMENT — PAIN SCALES - GENERAL: PAINLEVEL: NO PAIN

## 2019-10-17 ENCOUNTER — HOSPITAL ENCOUNTER (OUTPATIENT)
Dept: LAB | Facility: MEDICAL CENTER | Age: 74
End: 2019-10-17
Attending: NURSE PRACTITIONER
Payer: MEDICARE

## 2019-10-17 DIAGNOSIS — E11.8 TYPE 2 DIABETES MELLITUS WITH COMPLICATION, WITHOUT LONG-TERM CURRENT USE OF INSULIN (HCC): ICD-10-CM

## 2019-10-17 DIAGNOSIS — I10 ESSENTIAL HYPERTENSION, BENIGN: ICD-10-CM

## 2019-10-17 LAB
ANION GAP SERPL CALC-SCNC: 10 MMOL/L (ref 0–11.9)
BUN SERPL-MCNC: 28 MG/DL (ref 8–22)
CALCIUM SERPL-MCNC: 9.5 MG/DL (ref 8.5–10.5)
CHLORIDE SERPL-SCNC: 105 MMOL/L (ref 96–112)
CO2 SERPL-SCNC: 25 MMOL/L (ref 20–33)
CREAT SERPL-MCNC: 1.26 MG/DL (ref 0.5–1.4)
GLUCOSE SERPL-MCNC: 87 MG/DL (ref 65–99)
POTASSIUM SERPL-SCNC: 3.9 MMOL/L (ref 3.6–5.5)
SODIUM SERPL-SCNC: 140 MMOL/L (ref 135–145)

## 2019-10-17 PROCEDURE — 36415 COLL VENOUS BLD VENIPUNCTURE: CPT

## 2019-10-17 PROCEDURE — 80048 BASIC METABOLIC PNL TOTAL CA: CPT

## 2019-12-02 ENCOUNTER — OFFICE VISIT (OUTPATIENT)
Dept: MEDICAL GROUP | Facility: PHYSICIAN GROUP | Age: 74
End: 2019-12-02
Payer: MEDICARE

## 2019-12-02 VITALS
TEMPERATURE: 97.6 F | HEIGHT: 65 IN | RESPIRATION RATE: 20 BRPM | SYSTOLIC BLOOD PRESSURE: 124 MMHG | DIASTOLIC BLOOD PRESSURE: 78 MMHG | WEIGHT: 186 LBS | OXYGEN SATURATION: 97 % | HEART RATE: 74 BPM | BODY MASS INDEX: 30.99 KG/M2

## 2019-12-02 DIAGNOSIS — E66.9 OBESITY (BMI 30-39.9): ICD-10-CM

## 2019-12-02 DIAGNOSIS — N28.9 ABNORMAL KIDNEY FUNCTION: ICD-10-CM

## 2019-12-02 DIAGNOSIS — I10 ESSENTIAL HYPERTENSION, BENIGN: ICD-10-CM

## 2019-12-02 DIAGNOSIS — H66.90 ACUTE OTITIS MEDIA, UNSPECIFIED OTITIS MEDIA TYPE: ICD-10-CM

## 2019-12-02 DIAGNOSIS — H61.23 BILATERAL IMPACTED CERUMEN: ICD-10-CM

## 2019-12-02 DIAGNOSIS — E78.2 MIXED HYPERLIPIDEMIA: ICD-10-CM

## 2019-12-02 PROCEDURE — 99214 OFFICE O/P EST MOD 30 MIN: CPT | Performed by: NURSE PRACTITIONER

## 2019-12-02 RX ORDER — AZITHROMYCIN 250 MG/1
TABLET, FILM COATED ORAL
Qty: 6 TAB | Refills: 0 | Status: SHIPPED
Start: 2019-12-02 | End: 2020-01-16

## 2019-12-02 NOTE — PROGRESS NOTES
Chief Complaint   Patient presents with   • Otalgia         This is a 73 y.o.male patient that presents today with the following: Ear pain    Bilateral impacted cerumen  Patient does have bilateral ear discomfort, right greater than left.  He has had cerumen impaction in the past and feels this is likely the cause.  Upon physical examination bilateral EACs impacted with cerumen, this was successfully lavaged by MA.  Upon follow-up exam he was noted to have significant erythema to the right TM concerning for acute otitis media, this was treated with oral antibiotics, he will have to take a azithromycin secondary to penicillin allergy.    Acute otitis media  See additional notes    Essential hypertension, benign  Chronic and stable, well controlled on current medications.  He has follow-up appointment already scheduled in January, he will be due for labs, these been ordered.  Blood pressure today within normal limits and he denies symptoms of hypertension.    Mixed hyperlipidemia  The ASCVD Risk score (Herbiesoha SNOW Jr, et al., 2013) failed to calculate.  Patient is on statin, he takes atorvastatin 20 mg at bedtime.  He will be due for labs before his follow-up appointment with me in January.  Again discussed the importance of lifestyle modifications.    Obesity (BMI 30-39.9)  This is chronic and essentially unchanged, he continues to work on this, understands the risks associated with his weight.    Abnormal kidney function  With recent labs, patient was found to have abnormal kidney function with a decreased GFR and elevated creatinine.  Upon reevaluation about a month later his labs nearly normalized, creatinine within normal limits but GFR only improved from 39-59.  We discussed the importance of adequate hydration, keeping blood pressure under good control, avoiding excessive salt in the diet as well as excessive use of nephrotoxic medications.  He is going to have labs before he follows up with me in a couple  weeks.      No visits with results within 1 Month(s) from this visit.   Latest known visit with results is:   Hospital Outpatient Visit on 10/17/2019   Component Date Value   • Sodium 10/17/2019 140    • Potassium 10/17/2019 3.9    • Chloride 10/17/2019 105    • Co2 10/17/2019 25    • Glucose 10/17/2019 87    • Bun 10/17/2019 28*   • Creatinine 10/17/2019 1.26    • Calcium 10/17/2019 9.5    • Anion Gap 10/17/2019 10.0    • GFR If  10/17/2019 >60    • GFR If Non  Ameri* 10/17/2019 56*         clinical course has been stable    Past Medical History:   Diagnosis Date   • Anesthesia     2002-Wife reported pt flatlined after bronch. NO FURTHER PROBLEMS WITH FOLLOWING SURGERIES.    • Arm pain, left 1/7/2014   • Arthritis     general   • Asthma     Inhalers as needed   • Breath shortness 4/21/17    States only with exess exertion. No changes to status.   • CAD (coronary artery disease)     cad with an inconclusive thallium in 2004,  treated with medical management   • Cancer (HCC) 2002, 2006    Lung (2002)-surgery and radiation. Prostate (2006)-brachytherapy   • Carcinoma in situ of respiratory system 1998    Upper lung lobectomy and radiation therapy   • Cataract 2007    Bilateral phaco with IOL   • Colon polyps     NONCANCEROUS COLONIC POLYS 2004,MOST RECENT COLONOSCOPY 2005 WAS NEGATIVE   • Coronary atherosclerosis     CONTROLLED   • Diverticulitis     TREATED WITH HEMICOLECTOMY   • EKG abnormal 2000    ABN EKG W/OLD INFERIOR MI   • Emphysema of lung (HCC)     COPD   • Essential hypertension, benign 6/28/2013   • Essential hypertension, malignant     CONTROLLED   • Exposure     ORGANIC SOLVENT EXPOSURE IN THE 1980'S BUT HEAVY DOSES OF TYLENE AND XYLENE   • Hand pain, right 5/14/2010   • High cholesterol    • Lung cancer (HCC)     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, LEFT UPPER LOBE LOBECTOMY AND 6 WEEKS OF RADIATION IN 2001   • Mixed hyperlipidemia     CONTROLLED   •  "Nephrolithiasis 1967, 1968   • Osteoarthrosis involving, or with mention of more than one site, but not specified as generalized, multiple sites     CONTROLLED   • Pain 4/21/17    \"all over\"   • Personal history of malignant neoplasm of bronchus and lung     STABLE   • Personal history of malignant neoplasm of prostate     STABLE   • Pneumonia 2015    Also had previously   • Prediabetes 1/7/2014   • Prostate cancer (HCC) 2006    TREATED WITH BRACHY THERAPY   • Spinal fusion     C4-6, SPINAL FUSION PER DR JAY   • Unspecified vitamin D deficiency 5/14/2010       Past Surgical History:   Procedure Laterality Date   • KNEE ARTHROPLASTY TOTAL Left 5/5/2017    Procedure: KNEE ARTHROPLASTY TOTAL;  Surgeon: Ermias Griffith M.D.;  Location: SURGERY [facility];  Service:    • KNEE ARTHROSCOPY Right 2009    meniscus tear   • TRIGGER FINGER RELEASE Right 2009    index finger   • BICEPS TENDON REPAIR Right 2008   • CERVICAL DISK AND FUSION ANTERIOR  2008    C4-C5   • CATARACT PHACO WITH IOL Bilateral 2007   • BRACHY THERAPY  2006    Prostate CA   • KNEE ARTHROSCOPY Left 2003   • BRONCHOSCOPY  2002    Cancer   • LOBECTOMY Left 2002    Upper lung bronchial tube for CA   • SIGMOID COLECTOMY  2001    secondary to ruptured diverticulitis   • DENTAL EXTRACTION(S)  1969    wisdom teeth   • COLONOSCOPY  2001-present    every 3 years   • HERNIA REPAIR  2002, 2003    Abdominal incisional hernia, 2003 with mesh   • TONSILLECTOMY  as child       Family History   Adopted: Yes   Problem Relation Age of Onset   • Diabetes Neg Hx        Ampicillin; Clindamycin; and Demerol    Current Outpatient Medications Ordered in Epic   Medication Sig Dispense Refill   • azithromycin (ZITHROMAX) 250 MG Tab Take according to Z-pack instruction 6 Tab 0   • triamterene/hctz (MAXZIDE-25/DYAZIDE) 37.5-25 MG Cap Take 2 Caps by mouth every day. (Patient taking differently: Take 1 Cap by mouth every day.) 180 Cap 1   • aspirin 81 MG EC tablet Take 1 Tab " "by mouth every day. 90 Tab 3   • albuterol (PROVENTIL) 2.5mg/3ml Nebu Soln solution for nebulization 3 mL by Nebulization route every four hours as needed for Shortness of Breath. 75 mL 3   • metFORMIN ER (GLUCOPHAGE XR) 500 MG TABLET SR 24 HR Take 1 Tab by mouth 2 times a day. (Patient taking differently: Take 500 mg by mouth every day.) 180 Tab 3   • atorvastatin (LIPITOR) 40 MG Tab Take 0.5 Tabs by mouth every bedtime. 45 Tab 3   • etodolac (LODINE) 500 MG tablet Take 1 Tab by mouth 2 times a day. 180 Tab 3   • albuterol 108 (90 Base) MCG/ACT Aero Soln inhalation aerosol Inhale 2 Puffs by mouth every 6 hours as needed for Shortness of Breath. 1 Inhaler 3   • ipratropium (ATROVENT) 17 MCG/ACT Aero Soln Inhale 2 Puffs by mouth every 6 hours. 1 Inhaler 3   • Simethicone (GAS RELIEF EXTRA STRENGTH PO) Take  by mouth as needed.     • Cholecalciferol (VITAMIN D3) 5000 UNITS Cap Take 1 Cap by mouth every day.     • OCUVITE PO Take  by mouth every day. WITH ZINC        No current Epic-ordered facility-administered medications on file.        Constitutional ROS: No unexpected change in weight, No weakness, No unexplained fevers, sweats, or chills  Ear ROS: Positive per HPI  Pulmonary ROS: No chronic cough, sputum, or hemoptysis, No shortness of breath, No recent change in breathing  Cardiovascular ROS: No chest pain, No edema, No palpitations, Positive for hypertension and hyperlipidemia  Musculoskeletal/Extremities ROS: No clubbing, No peripheral edema, No pain, redness or swelling on the joints  Neurologic ROS: Normal development, No seizures, No weakness   ROS: Positive per HPI    Physical exam:  /78   Pulse 74   Temp 36.4 °C (97.6 °F) (Temporal)   Resp 20   Ht 1.651 m (5' 5\")   Wt 84.4 kg (186 lb)   SpO2 97%   BMI 30.95 kg/m²   General Appearance: Very pleasant elderly male, alert, no distress, obese, well-groomed  Skin: Skin color, texture, turgor normal. No rashes or lesions.  Ears: positive findings: " R TM - erythematous and obscured by cerumen, L TM - discolored and obscured by cerumen  Lungs: negative findings: normal respiratory rate and rhythm, normal effort  Abdomen: Abdomen soft, non-tender. BS normal. No masses,  No organomegaly  Musculoskeletal: negative findings: no evidence of joint instability, no evidence of muscle atrophy, no deformities present  Neurologic: intact, CN II through XII grossly intact    Medical decision making/discussion: Patient will start antibiotics for symptoms very consistent with acute otitis media.  He is to have labs done before he follows up with me at his already scheduled appointment in January.  We discussed the importance of continued lifestyle modifications in the effort to lose weight.    Jamie was seen today for otalgia.    Diagnoses and all orders for this visit:    Bilateral impacted cerumen  -     azithromycin (ZITHROMAX) 250 MG Tab; Take according to Z-pack instruction    Acute otitis media, unspecified otitis media type  -     azithromycin (ZITHROMAX) 250 MG Tab; Take according to Z-pack instruction    Essential hypertension, benign  -     CBC WITH DIFFERENTIAL; Future  -     Comp Metabolic Panel; Future  -     Lipid Profile; Future    Mixed hyperlipidemia  -     Comp Metabolic Panel; Future  -     Lipid Profile; Future    Abnormal kidney function  -     Comp Metabolic Panel; Future    Obesity (BMI 30-39.9)  -     Patient identified as having weight management issue.  Appropriate orders and counseling given.        Return in about 4 weeks (around 12/30/2019) for Follow-up, Discuss Labs.        Please note that this dictation was created using voice recognition software. I have made every reasonable attempt to correct obvious errors, but I expect that there are errors of grammar and possibly content that I did not discover before finalizing the note.

## 2019-12-03 PROBLEM — N28.9 ABNORMAL KIDNEY FUNCTION: Status: ACTIVE | Noted: 2019-12-03

## 2019-12-03 NOTE — ASSESSMENT & PLAN NOTE
With recent labs, patient was found to have abnormal kidney function with a decreased GFR and elevated creatinine.  Upon reevaluation about a month later his labs nearly normalized, creatinine within normal limits but GFR only improved from 39-59.  We discussed the importance of adequate hydration, keeping blood pressure under good control, avoiding excessive salt in the diet as well as excessive use of nephrotoxic medications.  He is going to have labs before he follows up with me in a couple weeks.

## 2019-12-03 NOTE — ASSESSMENT & PLAN NOTE
Patient does have bilateral ear discomfort, right greater than left.  He has had cerumen impaction in the past and feels this is likely the cause.  Upon physical examination bilateral EACs impacted with cerumen, this was successfully lavaged by MA.  Upon follow-up exam he was noted to have significant erythema to the right TM concerning for acute otitis media, this was treated with oral antibiotics, he will have to take a azithromycin secondary to penicillin allergy.

## 2019-12-03 NOTE — ASSESSMENT & PLAN NOTE
This is chronic and essentially unchanged, he continues to work on this, understands the risks associated with his weight.

## 2019-12-03 NOTE — ASSESSMENT & PLAN NOTE
The ASCVD Risk score (Herbiesoha SNOW Jr, et al., 2013) failed to calculate.  Patient is on statin, he takes atorvastatin 20 mg at bedtime.  He will be due for labs before his follow-up appointment with me in January.  Again discussed the importance of lifestyle modifications.

## 2019-12-03 NOTE — ASSESSMENT & PLAN NOTE
Chronic and stable, well controlled on current medications.  He has follow-up appointment already scheduled in January, he will be due for labs, these been ordered.  Blood pressure today within normal limits and he denies symptoms of hypertension.

## 2019-12-10 ENCOUNTER — PATIENT MESSAGE (OUTPATIENT)
Dept: MEDICAL GROUP | Facility: PHYSICIAN GROUP | Age: 74
End: 2019-12-10

## 2019-12-10 NOTE — TELEPHONE ENCOUNTER
From: Jamie Pacheco  To: DONNA Jarquin  Sent: 12/10/2019 6:22 AM PST  Subject: Non-Urgent Medical Question    Good morning Dr Sullivan,  I sent in a message yesterday about an appointment that Inés had made since her appointment on the 26th had to be changed.  She took an appointment on the 23rd @ 2:40. I have to work during that time frame so she will be unable to to make that appointment.  The appointment is to go over her lab results from the 16th of December.  I will be dropping off the Proxy Consent Form today.  I am going to calll scheduling and change her appointment today.  thank you

## 2019-12-26 DIAGNOSIS — M15.9 PRIMARY OSTEOARTHRITIS INVOLVING MULTIPLE JOINTS: ICD-10-CM

## 2019-12-26 DIAGNOSIS — E78.2 MIXED HYPERLIPIDEMIA: ICD-10-CM

## 2019-12-26 RX ORDER — ETODOLAC 500 MG/1
500 TABLET, FILM COATED ORAL 2 TIMES DAILY
Qty: 180 TAB | Refills: 3 | Status: CANCELLED | OUTPATIENT
Start: 2019-12-26

## 2019-12-26 RX ORDER — ATORVASTATIN CALCIUM 40 MG/1
20 TABLET, FILM COATED ORAL
Qty: 45 TAB | Refills: 3 | Status: CANCELLED | OUTPATIENT
Start: 2019-12-26

## 2019-12-30 DIAGNOSIS — M15.9 PRIMARY OSTEOARTHRITIS INVOLVING MULTIPLE JOINTS: ICD-10-CM

## 2019-12-30 DIAGNOSIS — E78.2 MIXED HYPERLIPIDEMIA: ICD-10-CM

## 2019-12-30 RX ORDER — ETODOLAC 500 MG/1
500 TABLET, FILM COATED ORAL 2 TIMES DAILY
Qty: 180 TAB | Refills: 3 | Status: CANCELLED | OUTPATIENT
Start: 2019-12-30

## 2019-12-31 DIAGNOSIS — M15.9 PRIMARY OSTEOARTHRITIS INVOLVING MULTIPLE JOINTS: ICD-10-CM

## 2019-12-31 RX ORDER — ETODOLAC 500 MG/1
500 TABLET, FILM COATED ORAL 2 TIMES DAILY
Qty: 180 TAB | Refills: 3 | Status: CANCELLED | OUTPATIENT
Start: 2019-12-31

## 2019-12-31 RX ORDER — ETODOLAC 500 MG/1
500 TABLET, FILM COATED ORAL 2 TIMES DAILY
Qty: 180 TAB | Refills: 3 | Status: SHIPPED | OUTPATIENT
Start: 2019-12-31 | End: 2020-12-23 | Stop reason: SDUPTHER

## 2020-01-03 RX ORDER — ATORVASTATIN CALCIUM 40 MG/1
20 TABLET, FILM COATED ORAL
Qty: 45 TAB | Refills: 3 | OUTPATIENT
Start: 2020-01-03

## 2020-01-06 NOTE — ASSESSMENT & PLAN NOTE
This is chronic, stable and very well controlled on metformin 500 mg twice daily.  A1c back in November was 6.8%, it is now down to 6.0%.  He is appropriately on statin. He is due to monofilament exam and will get copy of his last retinal exam.    No

## 2020-01-09 ENCOUNTER — HOSPITAL ENCOUNTER (OUTPATIENT)
Dept: LAB | Facility: MEDICAL CENTER | Age: 75
End: 2020-01-09
Attending: NURSE PRACTITIONER
Payer: MEDICARE

## 2020-01-09 DIAGNOSIS — E78.2 MIXED HYPERLIPIDEMIA: ICD-10-CM

## 2020-01-09 DIAGNOSIS — I10 ESSENTIAL HYPERTENSION, BENIGN: ICD-10-CM

## 2020-01-09 DIAGNOSIS — N28.9 ABNORMAL KIDNEY FUNCTION: ICD-10-CM

## 2020-01-09 LAB
ALBUMIN SERPL BCP-MCNC: 4.5 G/DL (ref 3.2–4.9)
ALBUMIN/GLOB SERPL: 1.6 G/DL
ALP SERPL-CCNC: 50 U/L (ref 30–99)
ALT SERPL-CCNC: 20 U/L (ref 2–50)
ANION GAP SERPL CALC-SCNC: 9 MMOL/L (ref 0–11.9)
AST SERPL-CCNC: 19 U/L (ref 12–45)
BASOPHILS # BLD AUTO: 0.3 % (ref 0–1.8)
BASOPHILS # BLD: 0.03 K/UL (ref 0–0.12)
BILIRUB SERPL-MCNC: 0.7 MG/DL (ref 0.1–1.5)
BUN SERPL-MCNC: 15 MG/DL (ref 8–22)
CALCIUM SERPL-MCNC: 9.3 MG/DL (ref 8.5–10.5)
CHLORIDE SERPL-SCNC: 103 MMOL/L (ref 96–112)
CHOLEST SERPL-MCNC: 119 MG/DL (ref 100–199)
CO2 SERPL-SCNC: 29 MMOL/L (ref 20–33)
CREAT SERPL-MCNC: 1.3 MG/DL (ref 0.5–1.4)
EOSINOPHIL # BLD AUTO: 0.4 K/UL (ref 0–0.51)
EOSINOPHIL NFR BLD: 4.4 % (ref 0–6.9)
ERYTHROCYTE [DISTWIDTH] IN BLOOD BY AUTOMATED COUNT: 42.2 FL (ref 35.9–50)
FASTING STATUS PATIENT QL REPORTED: NORMAL
GLOBULIN SER CALC-MCNC: 2.8 G/DL (ref 1.9–3.5)
GLUCOSE SERPL-MCNC: 100 MG/DL (ref 65–99)
HCT VFR BLD AUTO: 46.1 % (ref 42–52)
HDLC SERPL-MCNC: 49 MG/DL
HGB BLD-MCNC: 15.2 G/DL (ref 14–18)
IMM GRANULOCYTES # BLD AUTO: 0.03 K/UL (ref 0–0.11)
IMM GRANULOCYTES NFR BLD AUTO: 0.3 % (ref 0–0.9)
LDLC SERPL CALC-MCNC: 50 MG/DL
LYMPHOCYTES # BLD AUTO: 2.35 K/UL (ref 1–4.8)
LYMPHOCYTES NFR BLD: 25.8 % (ref 22–41)
MCH RBC QN AUTO: 29.1 PG (ref 27–33)
MCHC RBC AUTO-ENTMCNC: 33 G/DL (ref 33.7–35.3)
MCV RBC AUTO: 88.1 FL (ref 81.4–97.8)
MONOCYTES # BLD AUTO: 0.48 K/UL (ref 0–0.85)
MONOCYTES NFR BLD AUTO: 5.3 % (ref 0–13.4)
NEUTROPHILS # BLD AUTO: 5.82 K/UL (ref 1.82–7.42)
NEUTROPHILS NFR BLD: 63.9 % (ref 44–72)
NRBC # BLD AUTO: 0 K/UL
NRBC BLD-RTO: 0 /100 WBC
PLATELET # BLD AUTO: 271 K/UL (ref 164–446)
PMV BLD AUTO: 10.6 FL (ref 9–12.9)
POTASSIUM SERPL-SCNC: 4 MMOL/L (ref 3.6–5.5)
PROT SERPL-MCNC: 7.3 G/DL (ref 6–8.2)
RBC # BLD AUTO: 5.23 M/UL (ref 4.7–6.1)
SODIUM SERPL-SCNC: 141 MMOL/L (ref 135–145)
TRIGL SERPL-MCNC: 100 MG/DL (ref 0–149)
WBC # BLD AUTO: 9.1 K/UL (ref 4.8–10.8)

## 2020-01-09 PROCEDURE — 80053 COMPREHEN METABOLIC PANEL: CPT

## 2020-01-09 PROCEDURE — 85025 COMPLETE CBC W/AUTO DIFF WBC: CPT

## 2020-01-09 PROCEDURE — 80061 LIPID PANEL: CPT

## 2020-01-09 PROCEDURE — 36415 COLL VENOUS BLD VENIPUNCTURE: CPT

## 2020-01-16 ENCOUNTER — OFFICE VISIT (OUTPATIENT)
Dept: MEDICAL GROUP | Facility: PHYSICIAN GROUP | Age: 75
End: 2020-01-16
Payer: MEDICARE

## 2020-01-16 VITALS
WEIGHT: 201 LBS | SYSTOLIC BLOOD PRESSURE: 126 MMHG | HEIGHT: 65 IN | DIASTOLIC BLOOD PRESSURE: 74 MMHG | BODY MASS INDEX: 33.49 KG/M2 | OXYGEN SATURATION: 96 % | TEMPERATURE: 97.5 F | HEART RATE: 73 BPM | RESPIRATION RATE: 18 BRPM

## 2020-01-16 DIAGNOSIS — I10 ESSENTIAL HYPERTENSION, BENIGN: ICD-10-CM

## 2020-01-16 DIAGNOSIS — E78.2 MIXED HYPERLIPIDEMIA: ICD-10-CM

## 2020-01-16 DIAGNOSIS — N28.9 ABNORMAL KIDNEY FUNCTION: ICD-10-CM

## 2020-01-16 DIAGNOSIS — E11.8 TYPE 2 DIABETES MELLITUS WITH COMPLICATION, WITHOUT LONG-TERM CURRENT USE OF INSULIN (HCC): ICD-10-CM

## 2020-01-16 PROCEDURE — 99214 OFFICE O/P EST MOD 30 MIN: CPT | Performed by: NURSE PRACTITIONER

## 2020-01-16 RX ORDER — MINOCYCLINE HYDROCHLORIDE 50 MG/1
50 CAPSULE ORAL
COMMUNITY
Start: 2019-12-04 | End: 2020-07-16

## 2020-01-16 RX ORDER — HYDROCHLOROTHIAZIDE 25 MG/1
25 TABLET ORAL DAILY
Qty: 90 TAB | Refills: 3 | Status: SHIPPED | OUTPATIENT
Start: 2020-01-16 | End: 2021-03-08

## 2020-01-16 NOTE — ASSESSMENT & PLAN NOTE
Patient does continue to have mildly abnormal kidney function in the form of a decreased GFR it is at 54, last time measured it was 56.  We did discuss various causes of this and expressed the importance of adequate hydration, avoiding excessive salt in the diet, keeping blood pressure under good control and avoiding nephrotoxic medications.  We did get the medications he is on triamterene hydrochlorothiazide we will change this to hydrochlorothiazide by itself he will have repeat labs done in 6 months.

## 2020-01-16 NOTE — ASSESSMENT & PLAN NOTE
The ASCVD Risk score (Herbiesoha SNOW Jr, et al., 2013) failed to calculate.  Patient is on statin, takes atorvastatin 20 mg daily.  Again discussed the importance of lifestyle modifications.  He will follow-up with me in 6 months with labs.

## 2020-01-16 NOTE — PATIENT INSTRUCTIONS
Stop Triamterene-HCTZ, will have you start HCTZ alone    Labs in 6 months    Follow up with me in 6 months

## 2020-01-16 NOTE — ASSESSMENT & PLAN NOTE
Chronic, stable, well controlled with current blood pressure medications, we will be making changes to triamterene hydrochlorothiazide (see additional notes).  His blood pressure today is within normal limits and denies symptoms of hypertension, up-to-date with labs.

## 2020-01-16 NOTE — ASSESSMENT & PLAN NOTE
Chronic, stable, very well controlled on metformin extended release, currently taking 500 mg once a day, last known A1c was 5.6%.  Appropriately on statin but not on ACE or ARB.  He will be due for labs before he follows up with me in 6 months.  Again discussed the importance of lifestyle modifications.

## 2020-01-16 NOTE — PROGRESS NOTES
Chief Complaint   Patient presents with   • Hypertension     lab s completed 01/09/2020   • Diabetes         This is a 74 y.o.male patient that presents today with the following:follow up, review labs    Abnormal kidney function  Patient does continue to have mildly abnormal kidney function in the form of a decreased GFR it is at 54, last time measured it was 56.  We did discuss various causes of this and expressed the importance of adequate hydration, avoiding excessive salt in the diet, keeping blood pressure under good control and avoiding nephrotoxic medications.  We did get the medications he is on triamterene hydrochlorothiazide we will change this to hydrochlorothiazide by itself he will have repeat labs done in 6 months.    Essential hypertension, benign  Chronic, stable, well controlled with current blood pressure medications, we will be making changes to triamterene hydrochlorothiazide (see additional notes).  His blood pressure today is within normal limits and denies symptoms of hypertension, up-to-date with labs.    Mixed hyperlipidemia  The ASCVD Risk score (Herbiesoha SNOW Jr, et al., 2013) failed to calculate.  Patient is on statin, takes atorvastatin 20 mg daily.  Again discussed the importance of lifestyle modifications.  He will follow-up with me in 6 months with labs.    Type 2 diabetes mellitus with complication, without long-term current use of insulin (HCC)  Chronic, stable, very well controlled on metformin extended release, currently taking 500 mg once a day, last known A1c was 5.6%.  Appropriately on statin but not on ACE or ARB.  He will be due for labs before he follows up with me in 6 months.  Again discussed the importance of lifestyle modifications.      Hospital Outpatient Visit on 01/09/2020   Component Date Value   • Cholesterol,Tot 01/09/2020 119    • Triglycerides 01/09/2020 100    • HDL 01/09/2020 49    • LDL 01/09/2020 50    • Sodium 01/09/2020 141    • Potassium 01/09/2020 4.0    •  Chloride 01/09/2020 103    • Co2 01/09/2020 29    • Anion Gap 01/09/2020 9.0    • Glucose 01/09/2020 100*   • Bun 01/09/2020 15    • Creatinine 01/09/2020 1.30    • Calcium 01/09/2020 9.3    • AST(SGOT) 01/09/2020 19    • ALT(SGPT) 01/09/2020 20    • Alkaline Phosphatase 01/09/2020 50    • Total Bilirubin 01/09/2020 0.7    • Albumin 01/09/2020 4.5    • Total Protein 01/09/2020 7.3    • Globulin 01/09/2020 2.8    • A-G Ratio 01/09/2020 1.6    • WBC 01/09/2020 9.1    • RBC 01/09/2020 5.23    • Hemoglobin 01/09/2020 15.2    • Hematocrit 01/09/2020 46.1    • MCV 01/09/2020 88.1    • MCH 01/09/2020 29.1    • MCHC 01/09/2020 33.0*   • RDW 01/09/2020 42.2    • Platelet Count 01/09/2020 271    • MPV 01/09/2020 10.6    • Neutrophils-Polys 01/09/2020 63.90    • Lymphocytes 01/09/2020 25.80    • Monocytes 01/09/2020 5.30    • Eosinophils 01/09/2020 4.40    • Basophils 01/09/2020 0.30    • Immature Granulocytes 01/09/2020 0.30    • Nucleated RBC 01/09/2020 0.00    • Neutrophils (Absolute) 01/09/2020 5.82    • Lymphs (Absolute) 01/09/2020 2.35    • Monos (Absolute) 01/09/2020 0.48    • Eos (Absolute) 01/09/2020 0.40    • Baso (Absolute) 01/09/2020 0.03    • Immature Granulocytes (a* 01/09/2020 0.03    • NRBC (Absolute) 01/09/2020 0.00    • Fasting Status 01/09/2020 Fasting    • GFR If  01/09/2020 >60    • GFR If Non  Ameri* 01/09/2020 54*         clinical course has been stable    Past Medical History:   Diagnosis Date   • Anesthesia     2002-Wife reported pt flatlined after bronch. NO FURTHER PROBLEMS WITH FOLLOWING SURGERIES.    • Arm pain, left 1/7/2014   • Arthritis     general   • Asthma     Inhalers as needed   • Breath shortness 4/21/17    States only with exess exertion. No changes to status.   • CAD (coronary artery disease)     cad with an inconclusive thallium in 2004,  treated with medical management   • Cancer (HCC) 2002, 2006    Lung (2002)-surgery and radiation. Prostate (2006)-brachytherapy  "  • Carcinoma in situ of respiratory system 1998    Upper lung lobectomy and radiation therapy   • Cataract 2007    Bilateral phaco with IOL   • Colon polyps     NONCANCEROUS COLONIC POLYS 2004,MOST RECENT COLONOSCOPY 2005 WAS NEGATIVE   • Coronary atherosclerosis     CONTROLLED   • Diverticulitis     TREATED WITH HEMICOLECTOMY   • EKG abnormal 2000    ABN EKG W/OLD INFERIOR MI   • Emphysema of lung (HCC)     COPD   • Essential hypertension, benign 6/28/2013   • Essential hypertension, malignant     CONTROLLED   • Exposure     ORGANIC SOLVENT EXPOSURE IN THE 1980'S BUT HEAVY DOSES OF TYLENE AND XYLENE   • Hand pain, right 5/14/2010   • High cholesterol    • Lung cancer (HCC)     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, LEFT UPPER LOBE LOBECTOMY AND 6 WEEKS OF RADIATION IN 2001   • Mixed hyperlipidemia     CONTROLLED   • Nephrolithiasis 1967, 1968   • Osteoarthrosis involving, or with mention of more than one site, but not specified as generalized, multiple sites     CONTROLLED   • Pain 4/21/17    \"all over\"   • Personal history of malignant neoplasm of bronchus and lung     STABLE   • Personal history of malignant neoplasm of prostate     STABLE   • Pneumonia 2015    Also had previously   • Prediabetes 1/7/2014   • Prostate cancer (HCC) 2006    TREATED WITH BRACHY THERAPY   • Spinal fusion     C4-6, SPINAL FUSION PER DR JAY   • Unspecified vitamin D deficiency 5/14/2010       Past Surgical History:   Procedure Laterality Date   • KNEE ARTHROPLASTY TOTAL Left 5/5/2017    Procedure: KNEE ARTHROPLASTY TOTAL;  Surgeon: Ermias Griffith M.D.;  Location: SURGERY [facility];  Service:    • KNEE ARTHROSCOPY Right 2009    meniscus tear   • TRIGGER FINGER RELEASE Right 2009    index finger   • BICEPS TENDON REPAIR Right 2008   • CERVICAL DISK AND FUSION ANTERIOR  2008    C4-C5   • CATARACT PHACO WITH IOL Bilateral 2007   • BRACHY THERAPY  2006    Prostate CA   • KNEE ARTHROSCOPY Left 2003   • " BRONCHOSCOPY  2002    Cancer   • LOBECTOMY Left 2002    Upper lung bronchial tube for CA   • SIGMOID COLECTOMY  2001    secondary to ruptured diverticulitis   • DENTAL EXTRACTION(S)  1969    wisdom teeth   • COLONOSCOPY  2001-present    every 3 years   • HERNIA REPAIR  2002, 2003    Abdominal incisional hernia, 2003 with mesh   • TONSILLECTOMY  as child       Family History   Adopted: Yes   Problem Relation Age of Onset   • Diabetes Neg Hx        Ampicillin; Clindamycin; and Demerol    Current Outpatient Medications Ordered in Epic   Medication Sig Dispense Refill   • hydroCHLOROthiazide (HYDRODIURIL) 25 MG Tab Take 1 Tab by mouth every day. 90 Tab 3   • etodolac (LODINE) 500 MG tablet Take 1 Tab by mouth 2 times a day. 180 Tab 3   • atorvastatin (LIPITOR) 40 MG Tab Take 0.5 Tabs by mouth every bedtime. 45 Tab 3   • aspirin 81 MG EC tablet Take 1 Tab by mouth every day. 90 Tab 3   • albuterol (PROVENTIL) 2.5mg/3ml Nebu Soln solution for nebulization 3 mL by Nebulization route every four hours as needed for Shortness of Breath. 75 mL 3   • metFORMIN ER (GLUCOPHAGE XR) 500 MG TABLET SR 24 HR Take 1 Tab by mouth 2 times a day. (Patient taking differently: Take 500 mg by mouth every day.) 180 Tab 3   • albuterol 108 (90 Base) MCG/ACT Aero Soln inhalation aerosol Inhale 2 Puffs by mouth every 6 hours as needed for Shortness of Breath. 1 Inhaler 3   • ipratropium (ATROVENT) 17 MCG/ACT Aero Soln Inhale 2 Puffs by mouth every 6 hours. 1 Inhaler 3   • Simethicone (GAS RELIEF EXTRA STRENGTH PO) Take  by mouth as needed.     • Cholecalciferol (VITAMIN D3) 5000 UNITS Cap Take 1 Cap by mouth every day.     • OCUVITE PO Take  by mouth every day. WITH ZINC      • minocycline (MINOCIN) 50 MG Cap Take 50 mg by mouth.       No current Epic-ordered facility-administered medications on file.        Constitutional ROS: No unexpected change in weight, No weakness, No unexplained fevers, sweats, or chills  Pulmonary ROS: positive per  "HPI  Cardiovascular ROS: No chest pain, No edema, No palpitations, Positive for hypertension and hyperlipidemia  Gastrointestinal ROS: No abdominal pain, No nausea, vomiting, diarrhea, or constipation  Musculoskeletal/Extremities ROS: No clubbing, No peripheral edema, No pain, redness or swelling on the joints  Neurologic ROS: Normal development, No seizures, No weakness  Endo ROS: positive per HPI    Physical exam:  /74   Pulse 73   Temp 36.4 °C (97.5 °F) (Temporal)   Resp 18   Ht 1.651 m (5' 5\")   Wt 91.2 kg (201 lb)   SpO2 96%   BMI 33.45 kg/m²   General Appearance: pleasant elderly male, alert, no distress, obese, well groomed  Skin: Skin color, texture, turgor normal. No rashes or lesions.  Lungs: negative findings: normal respiratory rate and rhythm, mildly decreased lung sounds to left lower lobe posteriorly secondary to lobectomy otherwise rest of lung fields are clear  Heart: negative. RRR without murmur, gallop, or rubs.  No ectopy.  Abdomen: Abdomen soft, non-tender. BS normal. No masses,  No organomegaly  Musculoskeletal: negative findings: no evidence of joint instability, no evidence of muscle atrophy, no deformities present  Neurologic: intact, Cn 2-12 grossly intact    Medical decision making/discussion:       Stop Triamterene-HCTZ, will have you start HCTZ alone    Labs in 6 months    Follow up with me in 6 months          Jamie was seen today for hypertension and diabetes.    Diagnoses and all orders for this visit:    Essential hypertension, benign  -     hydroCHLOROthiazide (HYDRODIURIL) 25 MG Tab; Take 1 Tab by mouth every day.  -     Comp Metabolic Panel; Future    Mixed hyperlipidemia  -     Comp Metabolic Panel; Future    Abnormal kidney function  -     Comp Metabolic Panel; Future    Type 2 diabetes mellitus with complication, without long-term current use of insulin (HCC)  -     Comp Metabolic Panel; Future  -     HEMOGLOBIN A1C; Future  -     MICROALBUMIN CREAT RATIO URINE; " Future        Return in about 6 months (around 7/16/2020) for Follow-up, Discuss Labs.        Please note that this dictation was created using voice recognition software. I have made every reasonable attempt to correct obvious errors, but I expect that there are errors of grammar and possibly content that I did not discover before finalizing the note.

## 2020-02-04 ENCOUNTER — PATIENT MESSAGE (OUTPATIENT)
Dept: MEDICAL GROUP | Facility: PHYSICIAN GROUP | Age: 75
End: 2020-02-04

## 2020-02-05 ENCOUNTER — PATIENT MESSAGE (OUTPATIENT)
Dept: MEDICAL GROUP | Facility: PHYSICIAN GROUP | Age: 75
End: 2020-02-05

## 2020-02-05 NOTE — TELEPHONE ENCOUNTER
From: Jamie Pacheco  To: DONNA Jarquin  Sent: 2/4/2020 8:15 PM PST  Subject: Prescription Question    Just to let you know that Glenda is in [place]. I took her in on Sunday and most of the day having tests done. [place]. They admitted her late in the day. She was having all knids of saying crazy things . They admitted her and and did all sorts of things. Today right after I arrived a doctor thru and said she was in detox and would be in the hospital the rest of the week.  I know that there have been some problens with her drugs. I caught her putting my new bp pills in her container and then hid my bottle, denied she had and then it showed up in my pill box.  Can you send me a copy of the drugs for glenda and one for my drugs. I think glenda is mixing and matching pills.

## 2020-02-05 NOTE — TELEPHONE ENCOUNTER
From: Jamie Pacheco  To: Lexusagapito Weathers, Med Ass't  Sent: 2/5/2020 8:04 AM PST  Subject: Prescription Question    Can I  a copy of her meds from you in [place]?  ----- Message -----  From: Lexusjosie Weathers, Med Ass't  Sent: 2/5/2020 7:56 AM PST  To: Jamie Pacheco  Subject: RE: Prescription Question  Man,    Sorry to hear about Inés, hopefully she has a quick recovery. I can print out a med list for your wife , however I can't upload Inés's med list into your chart. Also note there might be medication changes from her hospital stay.  Please find your most recent list:    Current Outpatient Medications on File Prior to Visit:  minocycline (MINOCIN) 50 MG Cap, Take 50 mg by mouth., Disp: , Rfl:   hydroCHLOROthiazide (HYDRODIURIL) 25 MG Tab, Take 1 Tab by mouth every day., Disp: 90 Tab, Rfl: 3  etodolac (LODINE) 500 MG tablet, Take 1 Tab by mouth 2 times a day., Disp: 180 Tab, Rfl: 3  atorvastatin (LIPITOR) 40 MG Tab, Take 0.5 Tabs by mouth every bedtime., Disp: 45 Tab, Rfl: 3  aspirin 81 MG EC tablet, Take 1 Tab by mouth every day., Disp: 90 Tab, Rfl: 3  albuterol (PROVENTIL) 2.5mg/3ml Nebu Soln solution for nebulization, 3 mL by Nebulization route every four hours as needed for Shortness of Breath., Disp: 75 mL, Rfl: 3  metFORMIN ER (GLUCOPHAGE XR) 500 MG TABLET SR 24 HR, Take 1 Tab by mouth 2 times a day. (Patient taking differently: Take 500 mg by mouth every day.), Disp: 180 Tab, Rfl: 3  albuterol 108 (90 Base) MCG/ACT Aero Soln inhalation aerosol, Inhale 2 Puffs by mouth every 6 hours as needed for Shortness of Breath., Disp: 1 Inhaler, Rfl: 3  ipratropium (ATROVENT) 17 MCG/ACT Aero Soln, Inhale 2 Puffs by mouth every 6 hours., Disp: 1 Inhaler, Rfl: 3  Simethicone (GAS RELIEF EXTRA STRENGTH PO), Take by mouth as needed., Disp: , Rfl:   Cholecalciferol (VITAMIN D3) 5000 UNITS Cap, Take 1 Cap by mouth every day., Disp: , Rfl:   OCUVITE PO, Take by mouth every day. WITH ZINC , Disp: , Rfl:      Malik Penn MA         ----- Message -----   From: Jamie Pacheco   Sent: 2/4/2020 8:15 PM PST   To: DONNA Jarquin  Subject: Prescription Question    Just to let you know that Glenda is in [place]. I took her in on Sunday and most of the day having tests done. [place]. They admitted her late in the day. She was having all knids of saying crazy things . They admitted her and and did all sorts of things. Today right after I arrived a doctor thru and said she was in detox and would be in the hospital the rest of the week.  I know that there have been some problens with her drugs. I caught her putting my new bp pills in her container and then hid my bottle, denied she had and then it showed up in my pill box.  Can you send me a copy of the drugs for glenda and one for my drugs. I think glenda is mixing and matching pills.

## 2020-02-26 DIAGNOSIS — I25.119 CORONARY ARTERY DISEASE INVOLVING NATIVE CORONARY ARTERY OF NATIVE HEART WITH ANGINA PECTORIS (HCC): ICD-10-CM

## 2020-02-27 RX ORDER — ASPIRIN 81 MG/1
81 TABLET ORAL DAILY
Qty: 90 TAB | Refills: 1 | Status: SHIPPED | OUTPATIENT
Start: 2020-02-27 | End: 2020-08-19 | Stop reason: SDUPTHER

## 2020-02-27 NOTE — TELEPHONE ENCOUNTER
Was the patient seen in the last year in this department? Yes    Does patient have an active prescription for medications requested? No     Received Request Via: Pharmacy    Pt met protocol?: Yes     Last OV 01/16/2020

## 2020-03-03 ENCOUNTER — PATIENT MESSAGE (OUTPATIENT)
Dept: MEDICAL GROUP | Facility: PHYSICIAN GROUP | Age: 75
End: 2020-03-03

## 2020-03-03 NOTE — TELEPHONE ENCOUNTER
From: Jamie Pacheco  To: DONNA Jarquin  Sent: 3/3/2020 7:38 AM PST  Subject: Procedure Question    Good morning Dr Stephenson.  Inés is being released tomorrow.  Not sure if I sent you a list of meds for Inés.  I will drop off a copy at the .  If you would check it out. I marked what we have and what we don't have.    thank you  pato

## 2020-03-09 ENCOUNTER — PATIENT MESSAGE (OUTPATIENT)
Dept: MEDICAL GROUP | Facility: PHYSICIAN GROUP | Age: 75
End: 2020-03-09

## 2020-03-09 NOTE — TELEPHONE ENCOUNTER
From: Jamie Pacheco  To: DONNA Jarquin  Sent: 3/9/2020 10:50 AM PDT  Subject: Prescription Question    good morning  Did you happen to request a refill on the HYDROCHLOROTHIAZIDE.  If yo did then I do not have aclue as to where it is.  I have a bottle that says may refill 3 x by 01/08 /2021  I don't know why I cant seem to get all these drugs right.  I know you said that they prepackage but I just got a  a bunch of refills.

## 2020-05-06 ENCOUNTER — PATIENT MESSAGE (OUTPATIENT)
Dept: MEDICAL GROUP | Facility: PHYSICIAN GROUP | Age: 75
End: 2020-05-06

## 2020-06-01 ENCOUNTER — PATIENT MESSAGE (OUTPATIENT)
Dept: MEDICAL GROUP | Facility: PHYSICIAN GROUP | Age: 75
End: 2020-06-01

## 2020-06-01 NOTE — TELEPHONE ENCOUNTER
From: Jamie Pacheco  To: DONNA Jarquin  Sent: 6/1/2020 3:19 PM PDT  Subject: Prescription Question    Need to know if I have the proper list of drugs for Inés. All of a suuden when I get refills I get it out of wack.  DAYTIME MEDS  CLOPIDOGREL  PANTOPRAZOLE  HYDROCHLOROTHAZIDE  LOSARTAN  LEVOTHROXIN  B-1  D-3     NITE TIME MEDS    POTASSIUM CHLORIDE  ATORVASTATIN  SERTRALINE      THANK YOU

## 2020-06-02 ENCOUNTER — PATIENT MESSAGE (OUTPATIENT)
Dept: MEDICAL GROUP | Facility: PHYSICIAN GROUP | Age: 75
End: 2020-06-02

## 2020-06-02 NOTE — TELEPHONE ENCOUNTER
From: Jamie Pacheco  To: DONNA Jarquin  Sent: 6/2/2020 5:52 AM PDT  Subject: Non-Urgent Medical Question    Inés does not have an e-mail account.  I have tried to set up an account for her but can't get it to work.

## 2020-06-24 DIAGNOSIS — E11.8 TYPE 2 DIABETES MELLITUS WITH COMPLICATION, WITHOUT LONG-TERM CURRENT USE OF INSULIN (HCC): ICD-10-CM

## 2020-07-09 ENCOUNTER — HOSPITAL ENCOUNTER (OUTPATIENT)
Dept: LAB | Facility: MEDICAL CENTER | Age: 75
End: 2020-07-09
Attending: NURSE PRACTITIONER
Payer: MEDICARE

## 2020-07-09 DIAGNOSIS — E78.2 MIXED HYPERLIPIDEMIA: ICD-10-CM

## 2020-07-09 DIAGNOSIS — E11.8 TYPE 2 DIABETES MELLITUS WITH COMPLICATION, WITHOUT LONG-TERM CURRENT USE OF INSULIN (HCC): ICD-10-CM

## 2020-07-09 DIAGNOSIS — I10 ESSENTIAL HYPERTENSION, BENIGN: ICD-10-CM

## 2020-07-09 DIAGNOSIS — N28.9 ABNORMAL KIDNEY FUNCTION: ICD-10-CM

## 2020-07-09 LAB
ALBUMIN SERPL BCP-MCNC: 4.2 G/DL (ref 3.2–4.9)
ALBUMIN/GLOB SERPL: 1.6 G/DL
ALP SERPL-CCNC: 50 U/L (ref 30–99)
ALT SERPL-CCNC: 19 U/L (ref 2–50)
ANION GAP SERPL CALC-SCNC: 13 MMOL/L (ref 7–16)
AST SERPL-CCNC: 15 U/L (ref 12–45)
BILIRUB SERPL-MCNC: 0.5 MG/DL (ref 0.1–1.5)
BUN SERPL-MCNC: 17 MG/DL (ref 8–22)
CALCIUM SERPL-MCNC: 9.4 MG/DL (ref 8.5–10.5)
CHLORIDE SERPL-SCNC: 104 MMOL/L (ref 96–112)
CO2 SERPL-SCNC: 24 MMOL/L (ref 20–33)
CREAT SERPL-MCNC: 1.1 MG/DL (ref 0.5–1.4)
CREAT UR-MCNC: 187.67 MG/DL
EST. AVERAGE GLUCOSE BLD GHB EST-MCNC: 123 MG/DL
GLOBULIN SER CALC-MCNC: 2.6 G/DL (ref 1.9–3.5)
GLUCOSE SERPL-MCNC: 108 MG/DL (ref 65–99)
HBA1C MFR BLD: 5.9 % (ref 0–5.6)
MICROALBUMIN UR-MCNC: <1.2 MG/DL
MICROALBUMIN/CREAT UR: NORMAL MG/G (ref 0–30)
POTASSIUM SERPL-SCNC: 4 MMOL/L (ref 3.6–5.5)
PROT SERPL-MCNC: 6.8 G/DL (ref 6–8.2)
SODIUM SERPL-SCNC: 141 MMOL/L (ref 135–145)

## 2020-07-09 PROCEDURE — 36415 COLL VENOUS BLD VENIPUNCTURE: CPT | Mod: GA

## 2020-07-09 PROCEDURE — 83036 HEMOGLOBIN GLYCOSYLATED A1C: CPT | Mod: GA

## 2020-07-09 PROCEDURE — 80053 COMPREHEN METABOLIC PANEL: CPT

## 2020-07-09 PROCEDURE — 82570 ASSAY OF URINE CREATININE: CPT

## 2020-07-09 PROCEDURE — 82043 UR ALBUMIN QUANTITATIVE: CPT

## 2020-07-16 ENCOUNTER — OFFICE VISIT (OUTPATIENT)
Dept: MEDICAL GROUP | Facility: PHYSICIAN GROUP | Age: 75
End: 2020-07-16
Payer: MEDICARE

## 2020-07-16 VITALS
BODY MASS INDEX: 34.32 KG/M2 | HEART RATE: 78 BPM | OXYGEN SATURATION: 96 % | DIASTOLIC BLOOD PRESSURE: 76 MMHG | HEIGHT: 65 IN | TEMPERATURE: 97.7 F | RESPIRATION RATE: 18 BRPM | WEIGHT: 206 LBS | SYSTOLIC BLOOD PRESSURE: 124 MMHG

## 2020-07-16 DIAGNOSIS — Z23 NEED FOR VACCINATION: ICD-10-CM

## 2020-07-16 DIAGNOSIS — J43.9 PULMONARY EMPHYSEMA, UNSPECIFIED EMPHYSEMA TYPE (HCC): ICD-10-CM

## 2020-07-16 DIAGNOSIS — E11.8 TYPE 2 DIABETES MELLITUS WITH COMPLICATION, WITHOUT LONG-TERM CURRENT USE OF INSULIN (HCC): ICD-10-CM

## 2020-07-16 DIAGNOSIS — F32.1 CURRENT MODERATE EPISODE OF MAJOR DEPRESSIVE DISORDER WITHOUT PRIOR EPISODE (HCC): ICD-10-CM

## 2020-07-16 PROCEDURE — 99214 OFFICE O/P EST MOD 30 MIN: CPT | Performed by: NURSE PRACTITIONER

## 2020-07-16 ASSESSMENT — FIBROSIS 4 INDEX: FIB4 SCORE: 0.91

## 2020-07-16 NOTE — ASSESSMENT & PLAN NOTE
Chronic and stable  On metformin 500 mg once a day  A1c 5.9%  Appropriately on statin but not taking ACE inhibitor  Discussed the importance of ongoing lifestyle modifications  Patient follow-up in 6 months with labs before visit

## 2020-07-16 NOTE — ASSESSMENT & PLAN NOTE
Chronic and stable  Well-controlled with current medications to include ipratropium and albuterol  Has not reported any recent exacerbations  Continues to abstain from tobacco products

## 2020-07-16 NOTE — ASSESSMENT & PLAN NOTE
Chronic and stable  Not on pharmacotherapy and does not see behavioral health  Mostly related to his wife's declining health  Continues to decline medication and referral

## 2020-07-16 NOTE — PROGRESS NOTES
Chief Complaint   Patient presents with   • Hypertension         This is a 74 y.o.male patient that presents today with the following: follow up visit, review labs    Major depressive disorder  Chronic and stable  Not on pharmacotherapy and does not see behavioral health  Mostly related to his wife's declining health  Continues to decline medication and referral    Type 2 diabetes mellitus with complication, without long-term current use of insulin (HCC)  Chronic and stable  On metformin 500 mg once a day  A1c 5.9%  Appropriately on statin but not taking ACE inhibitor  Discussed the importance of ongoing lifestyle modifications  Patient follow-up in 6 months with labs before visit    Pulmonary emphysema (HCC)  Chronic and stable  Well-controlled with current medications to include ipratropium and albuterol  Has not reported any recent exacerbations  Continues to abstain from tobacco products      Hospital Outpatient Visit on 07/09/2020   Component Date Value   • Creatinine, Urine 07/09/2020 187.67    • Microalbumin, Urine Rand* 07/09/2020 <1.2    • Micro Alb Creat Ratio 07/09/2020 see below    • Glycohemoglobin 07/09/2020 5.9*   • Est Avg Glucose 07/09/2020 123    • Sodium 07/09/2020 141    • Potassium 07/09/2020 4.0    • Chloride 07/09/2020 104    • Co2 07/09/2020 24    • Anion Gap 07/09/2020 13.0    • Glucose 07/09/2020 108*   • Bun 07/09/2020 17    • Creatinine 07/09/2020 1.10    • Calcium 07/09/2020 9.4    • AST(SGOT) 07/09/2020 15    • ALT(SGPT) 07/09/2020 19    • Alkaline Phosphatase 07/09/2020 50    • Total Bilirubin 07/09/2020 0.5    • Albumin 07/09/2020 4.2    • Total Protein 07/09/2020 6.8    • Globulin 07/09/2020 2.6    • A-G Ratio 07/09/2020 1.6    • GFR If  07/09/2020 >60    • GFR If Non  Ameri* 07/09/2020 >60          clinical course has been stable    Past Medical History:   Diagnosis Date   • Anesthesia     2002-Wife reported pt flatlined after bronch. NO FURTHER PROBLEMS WITH  "FOLLOWING SURGERIES.    • Arm pain, left 1/7/2014   • Arthritis     general   • Asthma     Inhalers as needed   • Breath shortness 4/21/17    States only with exess exertion. No changes to status.   • CAD (coronary artery disease)     cad with an inconclusive thallium in 2004,  treated with medical management   • Cancer (HCC) 2002, 2006    Lung (2002)-surgery and radiation. Prostate (2006)-brachytherapy   • Carcinoma in situ of respiratory system 1998    Upper lung lobectomy and radiation therapy   • Cataract 2007    Bilateral phaco with IOL   • Colon polyps     NONCANCEROUS COLONIC POLYS 2004,MOST RECENT COLONOSCOPY 2005 WAS NEGATIVE   • Coronary atherosclerosis     CONTROLLED   • Diverticulitis     TREATED WITH HEMICOLECTOMY   • EKG abnormal 2000    ABN EKG W/OLD INFERIOR MI   • Emphysema of lung (HCC)     COPD   • Essential hypertension, benign 6/28/2013   • Essential hypertension, malignant     CONTROLLED   • Exposure     ORGANIC SOLVENT EXPOSURE IN THE 1980'S BUT HEAVY DOSES OF TYLENE AND XYLENE   • Hand pain, right 5/14/2010   • High cholesterol    • Lung cancer (HCC)     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, LEFT UPPER LOBE LOBECTOMY AND 6 WEEKS OF RADIATION IN 2001   • Mixed hyperlipidemia     CONTROLLED   • Nephrolithiasis 1967, 1968   • Osteoarthrosis involving, or with mention of more than one site, but not specified as generalized, multiple sites     CONTROLLED   • Pain 4/21/17    \"all over\"   • Personal history of malignant neoplasm of bronchus and lung     STABLE   • Personal history of malignant neoplasm of prostate     STABLE   • Pneumonia 2015    Also had previously   • Prediabetes 1/7/2014   • Prostate cancer (HCC) 2006    TREATED WITH BRACHY THERAPY   • Spinal fusion     C4-6, SPINAL FUSION PER DR JAY   • Unspecified vitamin D deficiency 5/14/2010       Past Surgical History:   Procedure Laterality Date   • KNEE ARTHROPLASTY TOTAL Left 5/5/2017    Procedure: KNEE ARTHROPLASTY " TOTAL;  Surgeon: Ermias Griffith M.D.;  Location: SURGERY [facility];  Service:    • KNEE ARTHROSCOPY Right 2009    meniscus tear   • TRIGGER FINGER RELEASE Right 2009    index finger   • BICEPS TENDON REPAIR Right 2008   • CERVICAL DISK AND FUSION ANTERIOR  2008    C4-C5   • CATARACT PHACO WITH IOL Bilateral 2007   • BRACHY THERAPY  2006    Prostate CA   • KNEE ARTHROSCOPY Left 2003   • BRONCHOSCOPY  2002    Cancer   • LOBECTOMY Left 2002    Upper lung bronchial tube for CA   • SIGMOID COLECTOMY  2001    secondary to ruptured diverticulitis   • DENTAL EXTRACTION(S)  1969    wisdom teeth   • COLONOSCOPY  2001-present    every 3 years   • HERNIA REPAIR  2002, 2003    Abdominal incisional hernia, 2003 with mesh   • TONSILLECTOMY  as child       Family History   Adopted: Yes   Problem Relation Age of Onset   • Diabetes Neg Hx        Ampicillin; Clindamycin; and Demerol    Current Outpatient Medications Ordered in Epic   Medication Sig Dispense Refill   • Zoster Vac Recomb Adjuvanted (SHINGRIX) 50 MCG/0.5ML Recon Susp 0.5 mL by Intramuscular route Once for 1 dose. 0.5 mL 0   • metFORMIN (GLUCOPHAGE) 500 MG Tab Take 1 Tab by mouth every day. 90 Tab 3   • aspirin 81 MG EC tablet Take 1 Tab by mouth every day. 90 Tab 1   • hydroCHLOROthiazide (HYDRODIURIL) 25 MG Tab Take 1 Tab by mouth every day. 90 Tab 3   • etodolac (LODINE) 500 MG tablet Take 1 Tab by mouth 2 times a day. 180 Tab 3   • atorvastatin (LIPITOR) 40 MG Tab Take 0.5 Tabs by mouth every bedtime. 45 Tab 3   • albuterol (PROVENTIL) 2.5mg/3ml Nebu Soln solution for nebulization 3 mL by Nebulization route every four hours as needed for Shortness of Breath. 75 mL 3   • albuterol 108 (90 Base) MCG/ACT Aero Soln inhalation aerosol Inhale 2 Puffs by mouth every 6 hours as needed for Shortness of Breath. 1 Inhaler 3   • ipratropium (ATROVENT) 17 MCG/ACT Aero Soln Inhale 2 Puffs by mouth every 6 hours. 1 Inhaler 3   • Simethicone (GAS RELIEF EXTRA STRENGTH PO)  "Take  by mouth as needed.     • Cholecalciferol (VITAMIN D3) 5000 UNITS Cap Take 1 Cap by mouth every day.     • OCUVITE PO Take  by mouth every day. WITH ZINC        No current Epic-ordered facility-administered medications on file.        Constitutional ROS: No unexpected change in weight, No weakness, No unexplained fevers, sweats, or chills  Pulmonary ROS: No shortness of breath, No recent change in breathing  Cardiovascular ROS: No chest pain, No edema, No palpitations, Positive for hypertension   Gastrointestinal ROS: No abdominal pain, No nausea, vomiting, diarrhea, or constipation  Musculoskeletal/Extremities ROS: No clubbing, No peripheral edema, No pain, redness or swelling on the joints  Neurologic ROS: Normal development, No seizures, No weakness  Endocrine ROS: positive per HPI    Physical exam:  /76   Pulse 78   Temp 36.5 °C (97.7 °F) (Temporal)   Resp 18   Ht 1.651 m (5' 5\")   Wt 93.4 kg (206 lb)   SpO2 96%   BMI 34.28 kg/m²   General Appearance: pleasant elderly male, alert, no distress, well groomed  Skin: Skin color, texture, turgor normal. No rashes or lesions.  Lungs: negative findings: normal respiratory rate and rhythm, lungs clear to auscultation  Heart: negative. RRR without murmur, gallop, or rubs.  No ectopy.  Abdomen: Abdomen soft, non-tender. BS normal. No masses,  No organomegaly  Musculoskeletal: negative findings: ROM of all joints is normal, no evidence of joint instability, strength normal, no deformities present  Neurologic: intact, CN 2-12 grossly intact  Diabetic Foot Exam: No ulcers, erythema or skin lesions present, patient tested with monofilament (10g) and tuning fork found to be sensitive bilaterally throughout the ball of the foot, great toe and heel.      Medical decision making/discussion:     Follow up in 6 months    Labs before visit    No changes to medications    Jamie was seen today for hypertension.    Diagnoses and all orders for this visit:    Type 2 " diabetes mellitus with complication, without long-term current use of insulin (HCC)  -     Diabetic Monofilament LE Exam    Current moderate episode of major depressive disorder without prior episode (HCC)    Pulmonary emphysema, unspecified emphysema type (HCC)    Need for vaccination  -     Zoster Vac Recomb Adjuvanted (SHINGRIX) 50 MCG/0.5ML Recon Susp; 0.5 mL by Intramuscular route Once for 1 dose.        Return in about 6 months (around 1/16/2021) for Discuss Labs, Follow-up.        Please note that this dictation was created using voice recognition software. I have made every reasonable attempt to correct obvious errors, but I expect that there are errors of grammar and possibly content that I did not discover before finalizing the note.

## 2020-12-21 DIAGNOSIS — E78.2 MIXED HYPERLIPIDEMIA: ICD-10-CM

## 2020-12-23 DIAGNOSIS — M15.9 PRIMARY OSTEOARTHRITIS INVOLVING MULTIPLE JOINTS: ICD-10-CM

## 2020-12-23 RX ORDER — ETODOLAC 500 MG/1
500 TABLET, FILM COATED ORAL 2 TIMES DAILY
Qty: 180 TAB | Refills: 0 | Status: SHIPPED | OUTPATIENT
Start: 2020-12-23 | End: 2021-08-10 | Stop reason: SDUPTHER

## 2020-12-24 RX ORDER — ATORVASTATIN CALCIUM 40 MG/1
TABLET, FILM COATED ORAL
Qty: 45 TAB | Refills: 1 | Status: SHIPPED | OUTPATIENT
Start: 2020-12-24 | End: 2021-06-15

## 2020-12-24 NOTE — TELEPHONE ENCOUNTER
Pt has had OV within the 12 month protocol and lipid panel is current. 6 month supply sent to pharmacy.   Lab Results   Component Value Date/Time    CHOLSTRLTOT 119 01/09/2020 06:40 AM    LDL 50 01/09/2020 06:40 AM    HDL 49 01/09/2020 06:40 AM    TRIGLYCERIDE 100 01/09/2020 06:40 AM       Lab Results   Component Value Date/Time    SODIUM 141 07/09/2020 06:10 AM    POTASSIUM 4.0 07/09/2020 06:10 AM    CHLORIDE 104 07/09/2020 06:10 AM    CO2 24 07/09/2020 06:10 AM    GLUCOSE 108 (H) 07/09/2020 06:10 AM    BUN 17 07/09/2020 06:10 AM    CREATININE 1.10 07/09/2020 06:10 AM    CREATININE 1.2 04/28/2009 08:45 AM     Lab Results   Component Value Date/Time    ALKPHOSPHAT 50 07/09/2020 06:10 AM    ASTSGOT 15 07/09/2020 06:10 AM    ALTSGPT 19 07/09/2020 06:10 AM    TBILIRUBIN 0.5 07/09/2020 06:10 AM

## 2021-01-11 ENCOUNTER — TELEPHONE (OUTPATIENT)
Dept: URGENT CARE | Facility: PHYSICIAN GROUP | Age: 76
End: 2021-01-11

## 2021-01-11 ENCOUNTER — PATIENT MESSAGE (OUTPATIENT)
Dept: MEDICAL GROUP | Facility: PHYSICIAN GROUP | Age: 76
End: 2021-01-11

## 2021-01-11 DIAGNOSIS — E78.2 MIXED HYPERLIPIDEMIA: ICD-10-CM

## 2021-01-11 DIAGNOSIS — E11.8 TYPE 2 DIABETES MELLITUS WITH COMPLICATION, WITHOUT LONG-TERM CURRENT USE OF INSULIN (HCC): ICD-10-CM

## 2021-01-11 NOTE — TELEPHONE ENCOUNTER
I think I was only going to do an POCT A1c at his visit, but then order his labs at his appt. But, now labs are ordered

## 2021-01-11 NOTE — TELEPHONE ENCOUNTER
"Patient came to his 6am lab appointment this morning and there are no labs in his chart. He is very upset that I could not get him checked in because of this..saying \"now I have to come back for this, like I have nothing better to do\". I explained to him that I was very sorry that this happened and that we will call him as soon as there are labs for him to get done.      Can you please order pato some labs for his lab fv that is scheduled for 1/18/2021.       Thank you!  Lisa"

## 2021-01-12 NOTE — TELEPHONE ENCOUNTER
From: Jamie Pacheco  To: DONNA Jarquin  Sent: 1/11/2021 7:28 AM PST  Subject: Non-Urgent Medical Question    Good morning.  Labs all screwed up .  Paperwork I was given all screwed up.  Can't get a lab appt until Jan 14 10:30   My day starts around 4 AM.   Can't go that long without coffee.  Got a ripping headache now  Not sure if this msg even gets to you.   Have not gotten any response to last few I have sent.

## 2021-01-14 ENCOUNTER — HOSPITAL ENCOUNTER (OUTPATIENT)
Dept: LAB | Facility: MEDICAL CENTER | Age: 76
End: 2021-01-14
Attending: NURSE PRACTITIONER
Payer: MEDICARE

## 2021-01-14 DIAGNOSIS — E11.8 TYPE 2 DIABETES MELLITUS WITH COMPLICATION, WITHOUT LONG-TERM CURRENT USE OF INSULIN (HCC): ICD-10-CM

## 2021-01-14 DIAGNOSIS — E78.2 MIXED HYPERLIPIDEMIA: ICD-10-CM

## 2021-01-14 LAB
ALBUMIN SERPL BCP-MCNC: 4.4 G/DL (ref 3.2–4.9)
ALBUMIN/GLOB SERPL: 1.4 G/DL
ALP SERPL-CCNC: 59 U/L (ref 30–99)
ALT SERPL-CCNC: 25 U/L (ref 2–50)
ANION GAP SERPL CALC-SCNC: 12 MMOL/L (ref 7–16)
AST SERPL-CCNC: 20 U/L (ref 12–45)
BILIRUB SERPL-MCNC: 0.6 MG/DL (ref 0.1–1.5)
BUN SERPL-MCNC: 17 MG/DL (ref 8–22)
CALCIUM SERPL-MCNC: 9.7 MG/DL (ref 8.5–10.5)
CHLORIDE SERPL-SCNC: 104 MMOL/L (ref 96–112)
CHOLEST SERPL-MCNC: 108 MG/DL (ref 100–199)
CO2 SERPL-SCNC: 25 MMOL/L (ref 20–33)
CREAT SERPL-MCNC: 1.23 MG/DL (ref 0.5–1.4)
EST. AVERAGE GLUCOSE BLD GHB EST-MCNC: 131 MG/DL
FASTING STATUS PATIENT QL REPORTED: NORMAL
GLOBULIN SER CALC-MCNC: 3.1 G/DL (ref 1.9–3.5)
GLUCOSE SERPL-MCNC: 119 MG/DL (ref 65–99)
HBA1C MFR BLD: 6.2 % (ref 0–5.6)
HDLC SERPL-MCNC: 37 MG/DL
LDLC SERPL CALC-MCNC: 44 MG/DL
POTASSIUM SERPL-SCNC: 3.9 MMOL/L (ref 3.6–5.5)
PROT SERPL-MCNC: 7.5 G/DL (ref 6–8.2)
SODIUM SERPL-SCNC: 141 MMOL/L (ref 135–145)
TRIGL SERPL-MCNC: 133 MG/DL (ref 0–149)

## 2021-01-14 PROCEDURE — 80061 LIPID PANEL: CPT

## 2021-01-14 PROCEDURE — 83036 HEMOGLOBIN GLYCOSYLATED A1C: CPT | Mod: GA

## 2021-01-14 PROCEDURE — 36415 COLL VENOUS BLD VENIPUNCTURE: CPT

## 2021-01-14 PROCEDURE — 80053 COMPREHEN METABOLIC PANEL: CPT

## 2021-01-18 ENCOUNTER — OFFICE VISIT (OUTPATIENT)
Dept: MEDICAL GROUP | Facility: PHYSICIAN GROUP | Age: 76
End: 2021-01-18
Payer: MEDICARE

## 2021-01-18 VITALS
WEIGHT: 220 LBS | SYSTOLIC BLOOD PRESSURE: 126 MMHG | TEMPERATURE: 98.5 F | BODY MASS INDEX: 36.65 KG/M2 | OXYGEN SATURATION: 95 % | HEART RATE: 85 BPM | DIASTOLIC BLOOD PRESSURE: 80 MMHG | HEIGHT: 65 IN | RESPIRATION RATE: 20 BRPM

## 2021-01-18 DIAGNOSIS — F32.1 CURRENT MODERATE EPISODE OF MAJOR DEPRESSIVE DISORDER WITHOUT PRIOR EPISODE (HCC): ICD-10-CM

## 2021-01-18 DIAGNOSIS — E11.8 TYPE 2 DIABETES MELLITUS WITH COMPLICATION, WITHOUT LONG-TERM CURRENT USE OF INSULIN (HCC): ICD-10-CM

## 2021-01-18 DIAGNOSIS — I25.119 CORONARY ARTERY DISEASE INVOLVING NATIVE CORONARY ARTERY OF NATIVE HEART WITH ANGINA PECTORIS (HCC): ICD-10-CM

## 2021-01-18 DIAGNOSIS — J43.9 PULMONARY EMPHYSEMA, UNSPECIFIED EMPHYSEMA TYPE (HCC): ICD-10-CM

## 2021-01-18 PROCEDURE — 99214 OFFICE O/P EST MOD 30 MIN: CPT | Performed by: NURSE PRACTITIONER

## 2021-01-18 ASSESSMENT — PATIENT HEALTH QUESTIONNAIRE - PHQ9
9. THOUGHTS THAT YOU WOULD BE BETTER OFF DEAD, OR OF HURTING YOURSELF: NOT AT ALL
2. FEELING DOWN, DEPRESSED, IRRITABLE, OR HOPELESS: NEARLY EVERY DAY
5. POOR APPETITE OR OVEREATING: NOT AT ALL
SUM OF ALL RESPONSES TO PHQ QUESTIONS 1-9: 6
SUM OF ALL RESPONSES TO PHQ9 QUESTIONS 1 AND 2: 3
4. FEELING TIRED OR HAVING LITTLE ENERGY: MORE THAN HALF THE DAYS
3. TROUBLE FALLING OR STAYING ASLEEP OR SLEEPING TOO MUCH: SEVERAL DAYS
7. TROUBLE CONCENTRATING ON THINGS, SUCH AS READING THE NEWSPAPER OR WATCHING TELEVISION: NOT AT ALL
6. FEELING BAD ABOUT YOURSELF - OR THAT YOU ARE A FAILURE OR HAVE LET YOURSELF OR YOUR FAMILY DOWN: NOT AL ALL
1. LITTLE INTEREST OR PLEASURE IN DOING THINGS: NOT AT ALL
8. MOVING OR SPEAKING SO SLOWLY THAT OTHER PEOPLE COULD HAVE NOTICED. OR THE OPPOSITE, BEING SO FIGETY OR RESTLESS THAT YOU HAVE BEEN MOVING AROUND A LOT MORE THAN USUAL: NOT AT ALL

## 2021-01-18 ASSESSMENT — FIBROSIS 4 INDEX: FIB4 SCORE: 1.08

## 2021-01-18 NOTE — ASSESSMENT & PLAN NOTE
This is a chronic condition, suboptimally controlled  Not on pharmacotherapy nor does he see behavioral health at this time and continues to decline medications at referral  Its that his depression is worsened by his wife's declining health, she is suffering from worsening dementia

## 2021-01-18 NOTE — ASSESSMENT & PLAN NOTE
This is a chronic condition, stable and adequately controlled on current medications including metformin 500 mg daily  A1c is 6.2%  He is appropriately on statin  States he is up-to-date with retinal scan, we will request those outside records  Patient to follow-up in 6 months with labs done before visit  Discussed the importance of ongoing healthy lifestyle modifications

## 2021-01-18 NOTE — PROGRESS NOTES
Chief Complaint   Patient presents with   • Diabetes         This is a 75 y.o.male patient that presents today with the following: Follow-up, review labs    CAD (coronary artery disease), native coronary artery  This is a chronic condition, stable  Denies symptoms associated with this  Appropriately on statin and low-dose aspirin  Previously followed by cardiology but no longer sees specialist and continues to decline referral    Pulmonary emphysema (HCC)  This is a chronic and stable condition  Well-controlled on current medications including ipratropium and albuterol as needed  No longer followed by pulmonologist and declines referral  He does not report any recent exacerbation and continues to abstain from tobacco products    Major depressive disorder  This is a chronic condition, suboptimally controlled  Not on pharmacotherapy nor does he see behavioral health at this time and continues to decline medications at referral  Its that his depression is worsened by his wife's declining health, she is suffering from worsening dementia    Type 2 diabetes mellitus with complication, without long-term current use of insulin (HCC)  This is a chronic condition, stable and adequately controlled on current medications including metformin 500 mg daily  A1c is 6.2%  He is appropriately on statin  States he is up-to-date with retinal scan, we will request those outside records  Patient to follow-up in 6 months with labs done before visit  Discussed the importance of ongoing healthy lifestyle modifications      Hospital Outpatient Visit on 01/14/2021   Component Date Value   • Cholesterol,Tot 01/14/2021 108    • Triglycerides 01/14/2021 133    • HDL 01/14/2021 37*   • LDL 01/14/2021 44    • Glycohemoglobin 01/14/2021 6.2*   • Est Avg Glucose 01/14/2021 131    • Sodium 01/14/2021 141    • Potassium 01/14/2021 3.9    • Chloride 01/14/2021 104    • Co2 01/14/2021 25    • Anion Gap 01/14/2021 12.0    • Glucose 01/14/2021 119*   •  Bun 01/14/2021 17    • Creatinine 01/14/2021 1.23    • Calcium 01/14/2021 9.7    • AST(SGOT) 01/14/2021 20    • ALT(SGPT) 01/14/2021 25    • Alkaline Phosphatase 01/14/2021 59    • Total Bilirubin 01/14/2021 0.6    • Albumin 01/14/2021 4.4    • Total Protein 01/14/2021 7.5    • Globulin 01/14/2021 3.1    • A-G Ratio 01/14/2021 1.4    • Fasting Status 01/14/2021 Fasting    • GFR If  01/14/2021 >60    • GFR If Non  Ameri* 01/14/2021 58*         clinical course has been stable    Past Medical History:   Diagnosis Date   • Anesthesia     2002-Wife reported pt flatlined after bronch. NO FURTHER PROBLEMS WITH FOLLOWING SURGERIES.    • Arm pain, left 1/7/2014   • Arthritis     general   • Asthma     Inhalers as needed   • Breath shortness 4/21/17    States only with exess exertion. No changes to status.   • CAD (coronary artery disease)     cad with an inconclusive thallium in 2004,  treated with medical management   • Cancer (HCC) 2002, 2006    Lung (2002)-surgery and radiation. Prostate (2006)-brachytherapy   • Carcinoma in situ of respiratory system 1998    Upper lung lobectomy and radiation therapy   • Cataract 2007    Bilateral phaco with IOL   • Colon polyps     NONCANCEROUS COLONIC POLYS 2004,MOST RECENT COLONOSCOPY 2005 WAS NEGATIVE   • Coronary atherosclerosis     CONTROLLED   • Diverticulitis     TREATED WITH HEMICOLECTOMY   • EKG abnormal 2000    ABN EKG W/OLD INFERIOR MI   • Emphysema of lung (HCC)     COPD   • Essential hypertension, benign 6/28/2013   • Essential hypertension, malignant     CONTROLLED   • Exposure     ORGANIC SOLVENT EXPOSURE IN THE 1980'S BUT HEAVY DOSES OF TYLENE AND XYLENE   • Hand pain, right 5/14/2010   • High cholesterol    • Lung cancer (HCC)     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, LEFT UPPER LOBE LOBECTOMY AND 6 WEEKS OF RADIATION IN 2001   • Mixed hyperlipidemia     CONTROLLED   • Nephrolithiasis 1967, 1968   • Osteoarthrosis involving, or with  "mention of more than one site, but not specified as generalized, multiple sites     CONTROLLED   • Pain 4/21/17    \"all over\"   • Personal history of malignant neoplasm of bronchus and lung     STABLE   • Personal history of malignant neoplasm of prostate     STABLE   • Pneumonia 2015    Also had previously   • Prediabetes 1/7/2014   • Prostate cancer (HCC) 2006    TREATED WITH BRACHY THERAPY   • Spinal fusion     C4-6, SPINAL FUSION PER DR JAY   • Unspecified vitamin D deficiency 5/14/2010       Past Surgical History:   Procedure Laterality Date   • KNEE ARTHROPLASTY TOTAL Left 5/5/2017    Procedure: KNEE ARTHROPLASTY TOTAL;  Surgeon: Ermias Griffith M.D.;  Location: SURGERY [facility];  Service:    • KNEE ARTHROSCOPY Right 2009    meniscus tear   • TRIGGER FINGER RELEASE Right 2009    index finger   • BICEPS TENDON REPAIR Right 2008   • CERVICAL DISK AND FUSION ANTERIOR  2008    C4-C5   • CATARACT PHACO WITH IOL Bilateral 2007   • BRACHY THERAPY  2006    Prostate CA   • KNEE ARTHROSCOPY Left 2003   • BRONCHOSCOPY  2002    Cancer   • LOBECTOMY Left 2002    Upper lung bronchial tube for CA   • SIGMOID COLECTOMY  2001    secondary to ruptured diverticulitis   • DENTAL EXTRACTION(S)  1969    wisdom teeth   • COLONOSCOPY  2001-present    every 3 years   • HERNIA REPAIR  2002, 2003    Abdominal incisional hernia, 2003 with mesh   • TONSILLECTOMY  as child       Family History   Adopted: Yes   Problem Relation Age of Onset   • Diabetes Neg Hx        Ampicillin, Clindamycin, and Demerol    Current Outpatient Medications Ordered in Epic   Medication Sig Dispense Refill   • atorvastatin (LIPITOR) 40 MG Tab TAKE 1/2 (ONE-HALF) TABLET BY MOUTH EVERY DAY AT BEDTIME 45 Tab 1   • etodolac (LODINE) 500 MG tablet Take 1 Tab by mouth 2 times a day. 180 Tab 0   • aspirin 81 MG EC tablet Take 1 Tab by mouth every day. 90 Tab 3   • metFORMIN (GLUCOPHAGE) 500 MG Tab Take 1 Tab by mouth every day. 90 Tab 3   • " "hydroCHLOROthiazide (HYDRODIURIL) 25 MG Tab Take 1 Tab by mouth every day. 90 Tab 3   • albuterol (PROVENTIL) 2.5mg/3ml Nebu Soln solution for nebulization 3 mL by Nebulization route every four hours as needed for Shortness of Breath. 75 mL 3   • albuterol 108 (90 Base) MCG/ACT Aero Soln inhalation aerosol Inhale 2 Puffs by mouth every 6 hours as needed for Shortness of Breath. 1 Inhaler 3   • ipratropium (ATROVENT) 17 MCG/ACT Aero Soln Inhale 2 Puffs by mouth every 6 hours. 1 Inhaler 3   • Simethicone (GAS RELIEF EXTRA STRENGTH PO) Take  by mouth as needed.     • Cholecalciferol (VITAMIN D3) 5000 UNITS Cap Take 1 Cap by mouth every day.     • OCUVITE PO Take  by mouth every day. WITH ZINC        No current Epic-ordered facility-administered medications on file.        Constitutional ROS: No unexpected change in weight, No weakness, No unexplained fevers, sweats, or chills  Pulmonary ROS: No shortness of breath, No recent change in breathing  Cardiovascular ROS: No chest pain  Gastrointestinal ROS: No abdominal pain, No nausea, vomiting, diarrhea, or constipation  Musculoskeletal/Extremities ROS: No clubbing, No peripheral edema, No pain, redness or swelling on the joints  Neurologic ROS: Normal development, No seizures, No weakness  Psychiatric ROS: Positive for depression  Endocrine ROS: Positive per HPI    Physical exam:  /80   Pulse 85   Temp 36.9 °C (98.5 °F) (Temporal)   Resp 20   Ht 1.651 m (5' 5\")   Wt 99.8 kg (220 lb)   SpO2 95%   BMI 36.61 kg/m²   General Appearance: Pleasant elderly male, alert, no distress, obese, well-groomed  Skin: Skin color, texture, turgor normal. No rashes or lesions.  Lungs: negative findings: normal respiratory rate and rhythm, lungs clear to auscultation  Heart: negative. RRR without murmur, gallop, or rubs.  No ectopy.  Abdomen: Abdomen soft, non-tender. BS normal. No masses,  No organomegaly  Musculoskeletal: negative findings: no evidence of joint instability, no " evidence of muscle atrophy, no deformities present  Neurologic: intact, CN II through XII grossly intact    Medical decision making/discussion:     Had long discussion with patient regarding his depression associated taking care of wife has dementia.  He continues to deny pharmacotherapy but he was encouraged to make a follow-up appointment should he change his mind.  Discussed other things he can do to help cope with his situation, he will work on this.  He is to follow-up in 6 months with labs done before visit.  Discussed the importance of making lifestyle modifications including healthy diet, regular exercise, and efforts towards weight loss    Jamie was seen today for diabetes.    Diagnoses and all orders for this visit:    Type 2 diabetes mellitus with complication, without long-term current use of insulin (HCC)  -     Basic Metabolic Panel; Future  -     HEMOGLOBIN A1C; Future    Pulmonary emphysema, unspecified emphysema type (HCC)    Current moderate episode of major depressive disorder without prior episode (HCC)    Coronary artery disease involving native coronary artery of native heart with angina pectoris (HCC)        Return in about 6 months (around 7/18/2021) for Follow-up, Discuss Labs.        Please note that this dictation was created using voice recognition software. I have made every reasonable attempt to correct obvious errors, but I expect that there are errors of grammar and possibly content that I did not discover before finalizing the note.

## 2021-01-18 NOTE — ASSESSMENT & PLAN NOTE
This is a chronic and stable condition  Well-controlled on current medications including ipratropium and albuterol as needed  No longer followed by pulmonologist and declines referral  He does not report any recent exacerbation and continues to abstain from tobacco products

## 2021-01-18 NOTE — ASSESSMENT & PLAN NOTE
This is a chronic condition, stable  Denies symptoms associated with this  Appropriately on statin and low-dose aspirin  Previously followed by cardiology but no longer sees specialist and continues to decline referral

## 2021-03-02 DIAGNOSIS — I10 ESSENTIAL HYPERTENSION, BENIGN: ICD-10-CM

## 2021-03-08 RX ORDER — HYDROCHLOROTHIAZIDE 25 MG/1
TABLET ORAL
Qty: 90 TABLET | Refills: 1 | Status: SHIPPED | OUTPATIENT
Start: 2021-03-08 | End: 2021-08-31

## 2021-03-08 NOTE — TELEPHONE ENCOUNTER
Refill X 6 months, sent to pharmacy.Pt. Seen in the last 6 months per protocol.   Lab Results   Component Value Date/Time    SODIUM 141 01/14/2021 06:30 AM    POTASSIUM 3.9 01/14/2021 06:30 AM    CHLORIDE 104 01/14/2021 06:30 AM    CO2 25 01/14/2021 06:30 AM    GLUCOSE 119 (H) 01/14/2021 06:30 AM    BUN 17 01/14/2021 06:30 AM    CREATININE 1.23 01/14/2021 06:30 AM    CREATININE 1.2 04/28/2009 08:45 AM

## 2021-03-12 DIAGNOSIS — E78.2 MIXED HYPERLIPIDEMIA: ICD-10-CM

## 2021-03-12 DIAGNOSIS — I10 ESSENTIAL HYPERTENSION, BENIGN: ICD-10-CM

## 2021-03-12 DIAGNOSIS — J43.9 PULMONARY EMPHYSEMA, UNSPECIFIED EMPHYSEMA TYPE (HCC): ICD-10-CM

## 2021-03-12 RX ORDER — ALBUTEROL SULFATE 90 UG/1
2 AEROSOL, METERED RESPIRATORY (INHALATION) EVERY 6 HOURS PRN
Qty: 1 EACH | Refills: 5 | Status: SHIPPED | OUTPATIENT
Start: 2021-03-12 | End: 2021-03-15 | Stop reason: SDUPTHER

## 2021-03-12 NOTE — TELEPHONE ENCOUNTER
Received request via: Pharmacy and Patient    Was the patient seen in the last year in this department? Yes    Does the patient have an active prescription (recently filled or refills available) for medication(s) requested? No

## 2021-03-16 ENCOUNTER — TELEPHONE (OUTPATIENT)
Dept: MEDICAL GROUP | Facility: PHYSICIAN GROUP | Age: 76
End: 2021-03-16

## 2021-03-17 NOTE — TELEPHONE ENCOUNTER
Patient called and stated he needed a refill of his inhalers.    Inhalers called in fri. I have called and LVM to inform patient of this

## 2021-06-13 DIAGNOSIS — E78.2 MIXED HYPERLIPIDEMIA: ICD-10-CM

## 2021-06-15 RX ORDER — ATORVASTATIN CALCIUM 40 MG/1
TABLET, FILM COATED ORAL
Qty: 45 TABLET | Refills: 0 | Status: SHIPPED | OUTPATIENT
Start: 2021-06-15 | End: 2021-08-27

## 2021-08-10 ENCOUNTER — OFFICE VISIT (OUTPATIENT)
Dept: MEDICAL GROUP | Facility: PHYSICIAN GROUP | Age: 76
End: 2021-08-10
Payer: MEDICARE

## 2021-08-10 VITALS
TEMPERATURE: 99 F | OXYGEN SATURATION: 94 % | SYSTOLIC BLOOD PRESSURE: 132 MMHG | HEART RATE: 80 BPM | DIASTOLIC BLOOD PRESSURE: 70 MMHG | HEIGHT: 66 IN | BODY MASS INDEX: 34.17 KG/M2 | WEIGHT: 212.6 LBS

## 2021-08-10 DIAGNOSIS — Z12.11 COLON CANCER SCREENING: ICD-10-CM

## 2021-08-10 DIAGNOSIS — Z76.89 ENCOUNTER TO ESTABLISH CARE: ICD-10-CM

## 2021-08-10 DIAGNOSIS — I10 ESSENTIAL HYPERTENSION, BENIGN: ICD-10-CM

## 2021-08-10 DIAGNOSIS — E11.8 TYPE 2 DIABETES MELLITUS WITH COMPLICATION, WITHOUT LONG-TERM CURRENT USE OF INSULIN (HCC): ICD-10-CM

## 2021-08-10 DIAGNOSIS — E78.2 MIXED HYPERLIPIDEMIA: ICD-10-CM

## 2021-08-10 DIAGNOSIS — M15.9 PRIMARY OSTEOARTHRITIS INVOLVING MULTIPLE JOINTS: ICD-10-CM

## 2021-08-10 PROBLEM — H66.90 ACUTE OTITIS MEDIA: Status: RESOLVED | Noted: 2019-12-02 | Resolved: 2021-08-10

## 2021-08-10 PROBLEM — M25.562 PAIN IN LATERAL PORTION OF LEFT KNEE: Status: RESOLVED | Noted: 2017-04-28 | Resolved: 2021-08-10

## 2021-08-10 PROCEDURE — 92250 FUNDUS PHOTOGRAPHY W/I&R: CPT | Mod: TC | Performed by: NURSE PRACTITIONER

## 2021-08-10 PROCEDURE — 99214 OFFICE O/P EST MOD 30 MIN: CPT | Performed by: NURSE PRACTITIONER

## 2021-08-10 RX ORDER — ETODOLAC 500 MG/1
500 TABLET, FILM COATED ORAL 2 TIMES DAILY
Qty: 180 TABLET | Refills: 0 | Status: SHIPPED | OUTPATIENT
Start: 2021-08-10 | End: 2023-06-13

## 2021-08-10 ASSESSMENT — FIBROSIS 4 INDEX: FIB4 SCORE: 1.08

## 2021-08-10 NOTE — ASSESSMENT & PLAN NOTE
Doing well. Reviewed labs with the patient.  Currently taking atorvastatin 40 mg tablet daily and is taking medications as prescribed.  Refills needed  no myalgias.

## 2021-08-10 NOTE — ASSESSMENT & PLAN NOTE
Stable.Currently taking hydrochlorothiazide 25 mg tablet daily and is tolerating it well.  No refills needed at this time.   Also taking baby aspirin. Denies lightheadedness, vision changes, headache, palpitations or leg swelling.

## 2021-08-10 NOTE — ASSESSMENT & PLAN NOTE
Patient reports that he has osteoarthritis in multiple joints in which he takes etodolac 500 mg daily.  He is tolerating this medication well.  He reports he does need refills today.  This was sent to his pharmacy.  He was educated to always take this medication with food, stay well-hydrated, and avoid excessively salty diet.  Kidney enzymes are stable.

## 2021-08-10 NOTE — ASSESSMENT & PLAN NOTE
This is a chronic condition.  Current medications: Metformin 500 mg daily.  Component      Latest Ref Rng & Units 7/9/2020 1/14/2021   Glycohemoglobin      0.0 - 5.6 % 5.9 (H) 6.2 (H)     Component      Latest Ref Rng & Units 7/9/2020   Microalbumin, Urine Random      mg/dL <1.2   Due for repeat microalbumin.  Order placed today.    Last diabetic foot exam: Monofilament exam performed today.  Last retinal eye exam: Performed today.  ACEi/ARB? none   Statin?atorvastatin 40 mg daily   Aspirin? 81 mg daily    Concomitant HTN?   Nightly foot checks?yes

## 2021-08-10 NOTE — PROGRESS NOTES
Chief Complaint   Patient presents with   • Establish Care       Subjective:     HPI:   Jamie Pacheco is a 75 y.o. male here to discuss the evaluation and management of:        Type 2 diabetes mellitus with complication, without long-term current use of insulin (HCC)  This is a chronic condition.  Current medications: Metformin 500 mg daily.  Component      Latest Ref Rng & Units 7/9/2020 1/14/2021   Glycohemoglobin      0.0 - 5.6 % 5.9 (H) 6.2 (H)     Component      Latest Ref Rng & Units 7/9/2020   Microalbumin, Urine Random      mg/dL <1.2   Due for repeat microalbumin.  Order placed today.    Last diabetic foot exam: Monofilament exam performed today.  Last retinal eye exam: Performed today.  ACEi/ARB? none   Statin?atorvastatin 40 mg daily   Aspirin? 81 mg daily    Concomitant HTN?   Nightly foot checks?yes      Essential hypertension, benign  Stable.Currently taking hydrochlorothiazide 25 mg tablet daily and is tolerating it well.  No refills needed at this time.   Also taking baby aspirin. Denies lightheadedness, vision changes, headache, palpitations or leg swelling.      Mixed hyperlipidemia  Doing well. Reviewed labs with the patient.  Currently taking atorvastatin 40 mg tablet daily and is taking medications as prescribed.  Refills needed  no myalgias.        Osteoarthritis of multiple joints  Patient reports that he has osteoarthritis in multiple joints in which he takes etodolac 500 mg daily.  He is tolerating this medication well.  He reports he does need refills today.  This was sent to his pharmacy.  He was educated to always take this medication with food, stay well-hydrated, and avoid excessively salty diet.  Kidney enzymes are stable.          ROS:  Gen: no fevers/chills, no changes in weight  Eyes: no changes in vision  ENT: no sore throat, no hearing loss, no bloody nose  Pulm: no sob, no cough  CV: no chest pain, no palpitations  GI: no nausea/vomiting, no diarrhea  : no dysuria  MSk: no  myalgias  Skin: no rash  Neuro: no headaches, no numbness/tingling  Heme/Lymph: no easy bruising    Allergies   Allergen Reactions   • Ampicillin      CAUSED A CHILDHOOD REACTION   • Clindamycin Diarrhea   • Demerol      CAUSES ECCHYMOSIS AND IS INEFFECTIVE       Current medicines (including changes today)  Current Outpatient Medications   Medication Sig Dispense Refill   • metFORMIN (GLUCOPHAGE) 500 MG Tab Take 1 tablet by mouth every day. 90 tablet 3   • etodolac (LODINE) 500 MG tablet Take 1 tablet by mouth 2 times a day. 180 tablet 0   • atorvastatin (LIPITOR) 40 MG Tab TAKE 1/2 (ONE-HALF) TABLET BY MOUTH EVERY DAY AT BEDTIME 45 tablet 0   • albuterol 108 (90 Base) MCG/ACT Aero Soln inhalation aerosol Inhale 2 Puffs every 6 hours as needed for Shortness of Breath. 1 Each 5   • ipratropium (ATROVENT) 17 MCG/ACT Aero Soln Inhale 2 Puffs every 6 hours. 1 Each 5   • hydroCHLOROthiazide (HYDRODIURIL) 25 MG Tab Take 1 tablet by mouth once daily 90 tablet 1   • aspirin 81 MG EC tablet Take 1 Tab by mouth every day. 90 Tab 3   • albuterol (PROVENTIL) 2.5mg/3ml Nebu Soln solution for nebulization 3 mL by Nebulization route every four hours as needed for Shortness of Breath. 75 mL 3   • Simethicone (GAS RELIEF EXTRA STRENGTH PO) Take  by mouth as needed.     • Cholecalciferol (VITAMIN D3) 5000 UNITS Cap Take 1 Cap by mouth every day.     • OCUVITE PO Take  by mouth every day. WITH ZINC        No current facility-administered medications for this visit.       Social History     Tobacco Use   • Smoking status: Former Smoker     Packs/day: 3.00     Years: 38.00     Pack years: 114.00     Types: Cigarettes     Quit date: 2001     Years since quittin.5   • Smokeless tobacco: Never Used   Vaping Use   • Vaping Use: Never used   Substance Use Topics   • Alcohol use: Yes     Alcohol/week: 0.0 oz     Comment: occ   • Drug use: No       Patient Active Problem List    Diagnosis Date Noted   • Abnormal kidney function  12/03/2019   • Bilateral impacted cerumen 12/02/2019   • Skin lesion 09/14/2019   • Upper back pain on right side 09/14/2019   • Type 2 diabetes mellitus with complication, without long-term current use of insulin (HCC) 12/12/2018   • Trigger index finger of left hand 12/12/2018   • Left wrist pain 12/12/2018   • Major depressive disorder 06/12/2018   • Degenerative arthritis of left knee 05/05/2017   • Marital problems 12/08/2016   • Obesity (BMI 30-39.9) 12/08/2016   • Chronic right shoulder pain 12/03/2015   • Left hip pain 02/26/2015   • Functional constipation 02/26/2015   • Personal history of skin cancer 08/22/2014   • Pulmonary emphysema (HCC) 01/07/2014   • Essential hypertension, benign 06/28/2013   • CAD (coronary artery disease), native coronary artery 05/19/2011   • Vitamin D deficiency 05/14/2010   • Mixed hyperlipidemia    • Personal history of malignant neoplasm of bronchus and lung    • Personal history of malignant neoplasm of prostate    • Osteoarthritis of multiple joints        Family History   Adopted: Yes   Problem Relation Age of Onset   • Diabetes Neg Hx           Objective:     No visits with results within 1 Month(s) from this visit.   Latest known visit with results is:   Hospital Outpatient Visit on 01/14/2021   Component Date Value Ref Range Status   • Cholesterol,Tot 01/14/2021 108  100 - 199 mg/dL Final   • Triglycerides 01/14/2021 133  0 - 149 mg/dL Final   • HDL 01/14/2021 37* >=40 mg/dL Final   • LDL 01/14/2021 44  <100 mg/dL Final   • Glycohemoglobin 01/14/2021 6.2* 0.0 - 5.6 % Final    Comment: Increased risk for diabetes:  5.7 -6.4%  Diabetes:  >6.4%  Glycemic control for adults with diabetes:  <7.0%  The above interpretations are per ADA guidelines.  Diagnosis  of diabetes mellitus on the basis of elevated Hemoglobin A1c  should be confirmed by repeating the Hb A1c test.     • Est Avg Glucose 01/14/2021 131  mg/dL Final    Comment: The eAG calculation is based on the  "A1c-Derived Daily Glucose  (ADAG) study.  See the ADA's website for additional information.     • Sodium 01/14/2021 141  135 - 145 mmol/L Final   • Potassium 01/14/2021 3.9  3.6 - 5.5 mmol/L Final   • Chloride 01/14/2021 104  96 - 112 mmol/L Final   • Co2 01/14/2021 25  20 - 33 mmol/L Final   • Anion Gap 01/14/2021 12.0  7.0 - 16.0 Final   • Glucose 01/14/2021 119* 65 - 99 mg/dL Final   • Bun 01/14/2021 17  8 - 22 mg/dL Final   • Creatinine 01/14/2021 1.23  0.50 - 1.40 mg/dL Final   • Calcium 01/14/2021 9.7  8.5 - 10.5 mg/dL Final   • AST(SGOT) 01/14/2021 20  12 - 45 U/L Final   • ALT(SGPT) 01/14/2021 25  2 - 50 U/L Final   • Alkaline Phosphatase 01/14/2021 59  30 - 99 U/L Final   • Total Bilirubin 01/14/2021 0.6  0.1 - 1.5 mg/dL Final   • Albumin 01/14/2021 4.4  3.2 - 4.9 g/dL Final   • Total Protein 01/14/2021 7.5  6.0 - 8.2 g/dL Final   • Globulin 01/14/2021 3.1  1.9 - 3.5 g/dL Final   • A-G Ratio 01/14/2021 1.4  g/dL Final   • Fasting Status 01/14/2021 Fasting   Final   • GFR If  01/14/2021 >60  >60 mL/min/1.73 m 2 Final   • GFR If Non  01/14/2021 58* >60 mL/min/1.73 m 2 Final       /78   Pulse 80   Temp 37.2 °C (99 °F) (Temporal)   Ht 1.664 m (5' 5.5\")   Wt 96.4 kg (212 lb 9.6 oz)   SpO2 94%  Body mass index is 34.84 kg/m².    Physical Exam:  Constitutional: Well-developed and well-nourished male in NAD. Not diaphoretic. No distress.   Skin: warm, dry, intact, no evidence of rash or concerning lesions  Head: Atraumatic without lesions.  Eyes: Conjunctivae and extraocular motions are normal. Pupils are equal, round, and reactive to light. No scleral icterus.   Ears:  External ears unremarkable.  Unable to visualize TMs.  Bilateral cerumen impaction.  MAs flushed ears, TMs visible and within normal limits.  Mouth/Throat: Tongue normal. Oropharynx is clear and moist. Posterior pharynx without erythema or exudates.  Neck: Supple, trachea midline. No thyromegaly present. No " cervical or supraclavicular lymphadenopathy.  Cardiovascular: Regular rate and rhythm without murmur. Radial pulses are intact and equal bilaterally.  Pulmonary: Clear to ausculation. Normal effort. No rales, ronchi, or wheezing.  Abdomen: Soft, non tender, and without distention. Active bowel sounds in all four quadrants. No rebound, guarding, masses   Extremities: No cyanosis, clubbing, erythema, nor edema. Monofilament testing with a 10 gram force: sensation intact: intact bilaterally  Visual Inspection: Feet without maceration, ulcers, fissures.  Pedal pulses: intact bilaterally    Neurological: Alert and oriented x 3. No cranial nerve deficit. 5/5 myotomes. Sensation intact.   Psychiatric:  Behavior, mood, and affect are appropriate.       Assessment and Plan:     The following treatment plan was discussed:    1. Encounter to establish care  Very pleasant 75 male presents today to establish care.  I have reviewed and updated his medical history, surgical history, family history, medications, allergies, and social determinants of health.  He does have history of diabetes however his A1c has been well controlled with low-dose Metformin.  Patient is due today for monofilament exam and retinal scan.  Patient is also due for colonoscopy.  Discussed need for vaccines however patient refused updates on vaccines today.      2. Type 2 diabetes mellitus with complication, without long-term current use of insulin (HCC)  Diabetes currently controlled.  Continue current medications.  Patient also taking statin, and ASA as instructed.  Labwork as indicated.  Diabetic foot exam and eye exams up to date.    - metFORMIN (GLUCOPHAGE) 500 MG Tab; Take 1 tablet by mouth every day.  Dispense: 90 tablet; Refill: 3  - POCT Retinal Eye Exam  - Diabetic Monofilament Lower Extremity Exam  - Microalbumin Creat Ratio Urine - Clinic Collect; Future    3. Essential hypertension, benign  Well-controlled on current regimen.  Labs as  indicated.  Continue antihypertensive medications.  Discussed decreasing salt intake.  Emphasized benefits of exercise and diet. Continue to monitor.    4. Primary osteoarthritis involving multiple joints  Reviewed the importance of making sure he takes his medication with food.  We will continue to monitor at future appointments.  - etodolac (LODINE) 500 MG tablet; Take 1 tablet by mouth 2 times a day.  Dispense: 180 tablet; Refill: 0    5. Mixed hyperlipidemia  Well controlled.  Labs as indicated.  Continue statin medication and lifestyle modifications.  Continue to monitor.    6. Colon cancer screening  - REFERRAL TO GI FOR COLONOSCOPY       Any change or worsening of signs or symptoms, patient encouraged to follow-up or report to emergency room for further evaluation. Patient verbalizes understanding and agrees.    Follow-Up: Return in about 6 months (around 2/10/2022) for Follow up labs.      PLEASE NOTE: This dictation was created using voice recognition software. I have made every reasonable attempt to correct obvious errors, but I expect that there are errors of grammar and possibly content that I did not discover before finalizing the note.

## 2021-08-26 DIAGNOSIS — E78.2 MIXED HYPERLIPIDEMIA: ICD-10-CM

## 2021-08-26 DIAGNOSIS — I10 ESSENTIAL HYPERTENSION, BENIGN: ICD-10-CM

## 2021-08-27 RX ORDER — ATORVASTATIN CALCIUM 40 MG/1
TABLET, FILM COATED ORAL
Qty: 45 TABLET | Refills: 0 | Status: SHIPPED | OUTPATIENT
Start: 2021-08-27 | End: 2021-10-25

## 2021-08-31 RX ORDER — HYDROCHLOROTHIAZIDE 25 MG/1
TABLET ORAL
Qty: 90 TABLET | Refills: 0 | Status: SHIPPED | OUTPATIENT
Start: 2021-08-31 | End: 2023-06-13

## 2021-11-03 ENCOUNTER — OFFICE VISIT (OUTPATIENT)
Dept: MEDICAL GROUP | Facility: PHYSICIAN GROUP | Age: 76
End: 2021-11-03
Payer: MEDICARE

## 2021-11-03 VITALS
TEMPERATURE: 98.5 F | HEIGHT: 65 IN | HEART RATE: 84 BPM | SYSTOLIC BLOOD PRESSURE: 130 MMHG | OXYGEN SATURATION: 96 % | WEIGHT: 204.2 LBS | BODY MASS INDEX: 34.02 KG/M2 | DIASTOLIC BLOOD PRESSURE: 90 MMHG

## 2021-11-03 DIAGNOSIS — Z02.89 ENCOUNTER FOR COMPLETION OF FORM WITH PATIENT: ICD-10-CM

## 2021-11-03 PROCEDURE — 99212 OFFICE O/P EST SF 10 MIN: CPT | Performed by: NURSE PRACTITIONER

## 2021-11-03 ASSESSMENT — FIBROSIS 4 INDEX: FIB4 SCORE: 1.08

## 2021-11-04 NOTE — PROGRESS NOTES
Chief Complaint   Patient presents with   • Follow-Up       HISTORY OF PRESENT ILLNESS: Patient is a 75 y.o. male established patient who presents today to discuss below issues      Encounter for completion of form with patient  Patient is here for medical clearance evaluation for DM[employer] for license renewal.  I have reviewed his medical history and medications with patient.  He indicates that he has not had an eye exam within the last year.  Due to his history of diabetes requested he be evaluated for his vision screening with optometry or ophthalmology.  Patient is A&O x4.          Patient Active Problem List    Diagnosis Date Noted   • Encounter for completion of form with patient 11/03/2021   • Abnormal kidney function 12/03/2019   • Bilateral impacted cerumen 12/02/2019   • Skin lesion 09/14/2019   • Upper back pain on right side 09/14/2019   • Type 2 diabetes mellitus with complication, without long-term current use of insulin (HCC) 12/12/2018   • Trigger index finger of left hand 12/12/2018   • Left wrist pain 12/12/2018   • Major depressive disorder 06/12/2018   • Degenerative arthritis of left knee 05/05/2017   • Marital problems 12/08/2016   • Obesity (BMI 30-39.9) 12/08/2016   • Chronic right shoulder pain 12/03/2015   • Left hip pain 02/26/2015   • Functional constipation 02/26/2015   • Personal history of skin cancer 08/22/2014   • Pulmonary emphysema (HCC) 01/07/2014   • Essential hypertension, benign 06/28/2013   • CAD (coronary artery disease), native coronary artery 05/19/2011   • Vitamin D deficiency 05/14/2010   • Mixed hyperlipidemia    • Personal history of malignant neoplasm of bronchus and lung    • Personal history of malignant neoplasm of prostate    • Osteoarthritis of multiple joints       Allergies:Ampicillin, Clindamycin, and Demerol    Current Outpatient Medications   Medication Sig Dispense Refill   • atorvastatin (LIPITOR) 40 MG Tab TAKE 1/2 (ONE-HALF) TABLET BY MOUTH EVERY DAY AT BEDTIME  "45 Tablet 3   • hydroCHLOROthiazide (HYDRODIURIL) 25 MG Tab Take 1 tablet by mouth once daily 90 Tablet 0   • metFORMIN (GLUCOPHAGE) 500 MG Tab Take 1 tablet by mouth every day. 90 tablet 3   • etodolac (LODINE) 500 MG tablet Take 1 tablet by mouth 2 times a day. 180 tablet 0   • albuterol 108 (90 Base) MCG/ACT Aero Soln inhalation aerosol Inhale 2 Puffs every 6 hours as needed for Shortness of Breath. 1 Each 5   • ipratropium (ATROVENT) 17 MCG/ACT Aero Soln Inhale 2 Puffs every 6 hours. 1 Each 5   • albuterol (PROVENTIL) 2.5mg/3ml Nebu Soln solution for nebulization 3 mL by Nebulization route every four hours as needed for Shortness of Breath. 75 mL 3   • Simethicone (GAS RELIEF EXTRA STRENGTH PO) Take  by mouth as needed.     • Cholecalciferol (VITAMIN D3) 5000 UNITS Cap Take 1 Cap by mouth every day.     • OCUVITE PO Take  by mouth every day. WITH ZINC      • aspirin 81 MG EC tablet Take 1 tablet by mouth once daily (Patient not taking: Reported on 11/3/2021) 90 Tablet 0     No current facility-administered medications for this visit.       Social History     Tobacco Use   • Smoking status: Former Smoker     Packs/day: 3.00     Years: 38.00     Pack years: 114.00     Types: Cigarettes     Quit date: 2001     Years since quittin.8   • Smokeless tobacco: Never Used   Vaping Use   • Vaping Use: Never used   Substance Use Topics   • Alcohol use: Yes     Alcohol/week: 0.0 oz     Comment: occ   • Drug use: No     Social History     Social History Narrative   • Not on file       Family History   Adopted: Yes   Problem Relation Age of Onset   • Diabetes Neg Hx        ROS:*  Gen: no fevers/chills, no changes in weight  Pulm: no sob, no cough  CV: no chest pain, no palpitations  MSk: no myalgias  Neuro: no headaches, no numbness/tingling      Exam:  /90   Pulse 84   Temp 36.9 °C (98.5 °F)   Ht 1.651 m (5' 5\")   Wt 92.6 kg (204 lb 3.2 oz)   SpO2 96%      Constitutional: In no apparent distress  Skin: no " rashes in visible areas, warm, dry  Cardiovascular: Regular rate and rhythm without murmur.   Pulmonary: Clear to ausculation. No rales, ronchi, or wheezing.  Abdomen: soft non-tender, no palpable liver, spleen, bladder or masses.  Extremities: no clubbing, cyanosis, or edema.  Psych: alert and oriented x 3, judgement and memory intact      Assessment and plan      1. 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  Patient will need to follow-up with [facility] for evaluation of vision as he has not seen an eye doctor in over a year.  Patient was agreeable to this and understands he is not able to obtain a license until his vision screening has been performed by an optometrist due to his long history of hypertension and diabetes.    Return if symptoms worsen or fail to improve.      This dictation was created using voice recognition software. I have made reasonable attempts to correct errors, however, errors of grammar and content may exist.

## 2021-11-04 NOTE — ASSESSMENT & PLAN NOTE
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  I have reviewed his medical history and medications with patient.  He indicates that he has not had an eye exam within the last year.  Due to his history of diabetes requested he be evaluated for his vision screening with optometry or ophthalmology.  Patient is A&O x4.

## 2021-11-06 ENCOUNTER — TELEPHONE (OUTPATIENT)
Dept: URGENT CARE | Facility: PHYSICIAN GROUP | Age: 76
End: 2021-11-06

## 2021-11-06 NOTE — TELEPHONE ENCOUNTER
Patient stopped by on Saturday stating he has implants in his eye and he should have 20/20 vision and does not need to see an opthalmologist.I scanned in his papers with the lens information into media. Please advise.

## 2022-06-26 ENCOUNTER — APPOINTMENT (OUTPATIENT)
Dept: RADIOLOGY | Facility: MEDICAL CENTER | Age: 77
End: 2022-06-26
Attending: EMERGENCY MEDICINE
Payer: MEDICARE

## 2022-06-26 ENCOUNTER — HOSPITAL ENCOUNTER (EMERGENCY)
Facility: MEDICAL CENTER | Age: 77
End: 2022-06-26
Attending: EMERGENCY MEDICINE
Payer: MEDICARE

## 2022-06-26 VITALS
SYSTOLIC BLOOD PRESSURE: 134 MMHG | OXYGEN SATURATION: 94 % | TEMPERATURE: 98.5 F | DIASTOLIC BLOOD PRESSURE: 74 MMHG | RESPIRATION RATE: 16 BRPM | HEART RATE: 68 BPM | WEIGHT: 238 LBS | HEIGHT: 67 IN | BODY MASS INDEX: 37.35 KG/M2

## 2022-06-26 DIAGNOSIS — N20.1 URETEROLITHIASIS: ICD-10-CM

## 2022-06-26 LAB
ALBUMIN SERPL BCP-MCNC: 3.8 G/DL (ref 3.2–4.9)
ALBUMIN/GLOB SERPL: 1.4 G/DL
ALP SERPL-CCNC: 60 U/L (ref 30–99)
ALT SERPL-CCNC: 11 U/L (ref 2–50)
ANION GAP SERPL CALC-SCNC: 12 MMOL/L (ref 7–16)
APPEARANCE UR: ABNORMAL
AST SERPL-CCNC: 14 U/L (ref 12–45)
BACTERIA #/AREA URNS HPF: NEGATIVE /HPF
BASOPHILS # BLD AUTO: 0.3 % (ref 0–1.8)
BASOPHILS # BLD: 0.03 K/UL (ref 0–0.12)
BILIRUB SERPL-MCNC: 0.7 MG/DL (ref 0.1–1.5)
BILIRUB UR QL STRIP.AUTO: NEGATIVE
BUN SERPL-MCNC: 12 MG/DL (ref 8–22)
CALCIUM SERPL-MCNC: 8.8 MG/DL (ref 8.5–10.5)
CHLORIDE SERPL-SCNC: 105 MMOL/L (ref 96–112)
CO2 SERPL-SCNC: 23 MMOL/L (ref 20–33)
COLOR UR: YELLOW
CREAT SERPL-MCNC: 1.19 MG/DL (ref 0.5–1.4)
EOSINOPHIL # BLD AUTO: 0.02 K/UL (ref 0–0.51)
EOSINOPHIL NFR BLD: 0.2 % (ref 0–6.9)
EPI CELLS #/AREA URNS HPF: NEGATIVE /HPF
ERYTHROCYTE [DISTWIDTH] IN BLOOD BY AUTOMATED COUNT: 42.1 FL (ref 35.9–50)
GFR SERPLBLD CREATININE-BSD FMLA CKD-EPI: 64 ML/MIN/1.73 M 2
GLOBULIN SER CALC-MCNC: 2.8 G/DL (ref 1.9–3.5)
GLUCOSE SERPL-MCNC: 110 MG/DL (ref 65–99)
GLUCOSE UR STRIP.AUTO-MCNC: NEGATIVE MG/DL
HCT VFR BLD AUTO: 46.4 % (ref 42–52)
HGB BLD-MCNC: 15.7 G/DL (ref 14–18)
HYALINE CASTS #/AREA URNS LPF: ABNORMAL /LPF
IMM GRANULOCYTES # BLD AUTO: 0.06 K/UL (ref 0–0.11)
IMM GRANULOCYTES NFR BLD AUTO: 0.5 % (ref 0–0.9)
KETONES UR STRIP.AUTO-MCNC: ABNORMAL MG/DL
LEUKOCYTE ESTERASE UR QL STRIP.AUTO: NEGATIVE
LIPASE SERPL-CCNC: 36 U/L (ref 11–82)
LYMPHOCYTES # BLD AUTO: 0.9 K/UL (ref 1–4.8)
LYMPHOCYTES NFR BLD: 8.1 % (ref 22–41)
MCH RBC QN AUTO: 28.6 PG (ref 27–33)
MCHC RBC AUTO-ENTMCNC: 33.8 G/DL (ref 33.7–35.3)
MCV RBC AUTO: 84.5 FL (ref 81.4–97.8)
MICRO URNS: ABNORMAL
MONOCYTES # BLD AUTO: 0.31 K/UL (ref 0–0.85)
MONOCYTES NFR BLD AUTO: 2.8 % (ref 0–13.4)
NEUTROPHILS # BLD AUTO: 9.86 K/UL (ref 1.82–7.42)
NEUTROPHILS NFR BLD: 88.1 % (ref 44–72)
NITRITE UR QL STRIP.AUTO: NEGATIVE
NRBC # BLD AUTO: 0 K/UL
NRBC BLD-RTO: 0 /100 WBC
PH UR STRIP.AUTO: 8 [PH] (ref 5–8)
PLATELET # BLD AUTO: 255 K/UL (ref 164–446)
PMV BLD AUTO: 10.1 FL (ref 9–12.9)
POTASSIUM SERPL-SCNC: 3.7 MMOL/L (ref 3.6–5.5)
PROT SERPL-MCNC: 6.6 G/DL (ref 6–8.2)
PROT UR QL STRIP: NEGATIVE MG/DL
RBC # BLD AUTO: 5.49 M/UL (ref 4.7–6.1)
RBC # URNS HPF: ABNORMAL /HPF
RBC UR QL AUTO: NEGATIVE
SODIUM SERPL-SCNC: 140 MMOL/L (ref 135–145)
SP GR UR STRIP.AUTO: 1.02
UROBILINOGEN UR STRIP.AUTO-MCNC: 0.2 MG/DL
WBC # BLD AUTO: 11.2 K/UL (ref 4.8–10.8)
WBC #/AREA URNS HPF: ABNORMAL /HPF

## 2022-06-26 PROCEDURE — 80053 COMPREHEN METABOLIC PANEL: CPT

## 2022-06-26 PROCEDURE — 36415 COLL VENOUS BLD VENIPUNCTURE: CPT

## 2022-06-26 PROCEDURE — 99284 EMERGENCY DEPT VISIT MOD MDM: CPT

## 2022-06-26 PROCEDURE — 81001 URINALYSIS AUTO W/SCOPE: CPT

## 2022-06-26 PROCEDURE — 83690 ASSAY OF LIPASE: CPT

## 2022-06-26 PROCEDURE — 85025 COMPLETE CBC W/AUTO DIFF WBC: CPT

## 2022-06-26 PROCEDURE — 74176 CT ABD & PELVIS W/O CONTRAST: CPT | Mod: ME

## 2022-06-26 RX ORDER — IBUPROFEN 600 MG/1
600 TABLET ORAL
Qty: 20 TABLET | Refills: 0 | Status: SHIPPED | OUTPATIENT
Start: 2022-06-26 | End: 2023-06-13

## 2022-06-26 RX ORDER — HYDROCODONE BITARTRATE AND ACETAMINOPHEN 5; 325 MG/1; MG/1
1-2 TABLET ORAL EVERY 6 HOURS PRN
Qty: 20 TABLET | Refills: 0 | Status: SHIPPED | OUTPATIENT
Start: 2022-06-26 | End: 2022-06-29

## 2022-06-26 RX ORDER — HYDROCODONE BITARTRATE AND ACETAMINOPHEN 5; 325 MG/1; MG/1
1-2 TABLET ORAL EVERY 6 HOURS PRN
Qty: 20 TABLET | Refills: 0 | Status: SHIPPED | OUTPATIENT
Start: 2022-06-26 | End: 2022-06-26 | Stop reason: SDUPTHER

## 2022-06-26 RX ORDER — ONDANSETRON 4 MG/1
4 TABLET, ORALLY DISINTEGRATING ORAL EVERY 8 HOURS PRN
Qty: 12 TABLET | Refills: 0 | Status: SHIPPED | OUTPATIENT
Start: 2022-06-26 | End: 2023-06-13

## 2022-06-26 RX ORDER — TAMSULOSIN HYDROCHLORIDE 0.4 MG/1
0.4 CAPSULE ORAL DAILY
Qty: 20 CAPSULE | Refills: 0 | Status: SHIPPED | OUTPATIENT
Start: 2022-06-26 | End: 2023-06-13

## 2022-06-26 NOTE — ED PROVIDER NOTES
ED Provider Note    CHIEF COMPLAINT  Chief Complaint   Patient presents with   • Flank Pain     L sided on and off x2 weeks. Hx of kidney stones       HPI  Jamie Pacheco is a 76 y.o. male who presents complaining of left-sided flank pain.  The nurses notes, if he has had off-and-on for 2 weeks.  To me he states he has been fine until this morning sudden onset of a stabbing pain in his left flank.  Although he does not offer spontaneously, when asked about the possibly of a kidney stone, he states that he has had kidney stones many years in the past, and this does feel like that.  Denies any urinary symptoms.  No testicular pain.  No stool changes.  No fever or chills.  No nausea or vomiting.  Prior to arrival, and EMS, he received Toradol and fentanyl.  Presently he feels better and the pain is gone.  There is no other complaint    PAST MEDICAL HISTORY  Past Medical History:   Diagnosis Date   • Anesthesia     2002-Wife reported pt flatlined after bronch. NO FURTHER PROBLEMS WITH FOLLOWING SURGERIES.    • Anxiety    • Arm pain, left 1/7/2014   • Arthritis     general   • Asthma     Inhalers as needed   • Breath shortness 4/21/17    States only with exess exertion. No changes to status.   • CAD (coronary artery disease)     cad with an inconclusive thallium in 2004,  treated with medical management   • Cancer (HCC) 2002, 2006    Lung (2002)-surgery and radiation. Prostate (2006)-brachytherapy   • Carcinoma in situ of respiratory system 1998    Upper lung lobectomy and radiation therapy   • Cataract 2007    Bilateral phaco with IOL   • Colon polyps     NONCANCEROUS COLONIC POLYS 2004,MOST RECENT COLONOSCOPY 2005 WAS NEGATIVE   • Coronary atherosclerosis     CONTROLLED   • Depression    • Diabetes (HCC)    • Diverticulitis     TREATED WITH HEMICOLECTOMY   • EKG abnormal 2000    ABN EKG W/OLD INFERIOR MI   • Emphysema of lung (HCC)     COPD   • Essential hypertension, benign 6/28/2013   • Essential  "hypertension, malignant     CONTROLLED   • Exposure     ORGANIC SOLVENT EXPOSURE IN THE S BUT HEAVY DOSES OF TYLENE AND XYLENE   • Hand pain, right 2010   • Heart attack (HCC)    • High cholesterol    • Lung cancer (HCC)     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, LEFT UPPER LOBE LOBECTOMY AND 6 WEEKS OF RADIATION IN    • Mixed hyperlipidemia     CONTROLLED   • Nephrolithiasis ,    • Osteoarthrosis involving, or with mention of more than one site, but not specified as generalized, multiple sites     CONTROLLED   • Pain 17    \"all over\"   • Personal history of malignant neoplasm of bronchus and lung     STABLE   • Personal history of malignant neoplasm of prostate     STABLE   • Pneumonia 2015    Also had previously   • Prediabetes 2014   • Prostate cancer (HCC)     TREATED WITH BRACHY THERAPY   • Spinal fusion     C4-6, SPINAL FUSION PER DR JAY   • Unspecified vitamin D deficiency 2010       FAMILY HISTORY  Family History   Adopted: Yes   Problem Relation Age of Onset   • Diabetes Neg Hx        SOCIAL HISTORY  Social History     Tobacco Use   • Smoking status: Former Smoker     Packs/day: 3.00     Years: 38.00     Pack years: 114.00     Types: Cigarettes     Quit date: 2001     Years since quittin.4   • Smokeless tobacco: Never Used   Vaping Use   • Vaping Use: Never used   Substance Use Topics   • Alcohol use: Yes     Alcohol/week: 0.0 oz     Comment: occ   • Drug use: No     He lives with his son.  Recently lost his wife.    SURGICAL HISTORY  Past Surgical History:   Procedure Laterality Date   • KNEE ARTHROPLASTY TOTAL Left 2017    Procedure: KNEE ARTHROPLASTY TOTAL;  Surgeon: Ermias Griffith M.D.;  Location: SURGERY [facility];  Service:    • KNEE ARTHROSCOPY Right 2009    meniscus tear   • TRIGGER FINGER RELEASE Right 2009    index finger   • BICEPS TENDON REPAIR Right    • CERVICAL DISK AND FUSION ANTERIOR  2008    C4-C5   • " "CATARACT PHACO WITH IOL Bilateral 2007   • BRACHY THERAPY  2006    Prostate CA   • KNEE ARTHROSCOPY Left 2003   • BRONCHOSCOPY  2002    Cancer   • LOBECTOMY Left 2002    Upper lung bronchial tube for CA   • SIGMOID COLECTOMY  2001    secondary to ruptured diverticulitis   • DENTAL EXTRACTION(S)  1969    wisdom teeth   • COLONOSCOPY  2001-present    every 3 years   • HERNIA REPAIR  2002, 2003    Abdominal incisional hernia, 2003 with mesh   • TONSILLECTOMY  as child       CURRENT MEDICATIONS  Home Medications     Reviewed by Madison Schaffer R.N. (Registered Nurse) on 06/26/22 at 0859  Med List Status: Partial   Medication Last Dose Status   albuterol (PROVENTIL) 2.5mg/3ml Nebu Soln solution for nebulization  Active   albuterol 108 (90 Base) MCG/ACT Aero Soln inhalation aerosol  Active   aspirin 81 MG EC tablet  Active   atorvastatin (LIPITOR) 40 MG Tab  Active   Cholecalciferol (VITAMIN D3) 5000 UNITS Cap  Active   etodolac (LODINE) 500 MG tablet  Active   hydroCHLOROthiazide (HYDRODIURIL) 25 MG Tab  Active   ipratropium (ATROVENT) 17 MCG/ACT Aero Soln  Active   metFORMIN (GLUCOPHAGE) 500 MG Tab  Active   OCUVITE PO  Active   Simethicone (GAS RELIEF EXTRA STRENGTH PO)  Active                I have reviewed the nurses notes and/or the list brought with the patient.    ALLERGIES  Allergies   Allergen Reactions   • Ampicillin      CAUSED A CHILDHOOD REACTION   • Clindamycin Diarrhea   • Demerol      CAUSES ECCHYMOSIS AND IS INEFFECTIVE       REVIEW OF SYSTEMS  See HPI for further details. Review of systems as above, otherwise all other systems are negative.     PHYSICAL EXAM  VITAL SIGNS: BP (!) 146/74   Pulse 70   Temp 37.1 °C (98.7 °F) (Temporal)   Resp 15   Ht 1.702 m (5' 7\")   Wt 108 kg (238 lb)   SpO2 97%   BMI 37.28 kg/m²     Constitutional: Well appearing patient in no acute distress.  Not toxic, nor ill in appearance.  HENT: Mucus membranes moist.  Oropharynx is clear.  Eyes: Pupils equally round.  No " scleral icterus.   Neck: Full nontender range of motion.  Lymphatic: No cervical lymphadenopathy noted.   Cardiovascular: Regular heart rate and rhythm.  No murmurs, rubs, nor gallop appreciated.   Thorax & Lungs: Chest is nontender.  Lungs are clear to auscultation with good air movement bilaterally.  No wheeze, rhonchi, nor rales.   Abdomen: Soft, with no tenderness, rebound nor guarding.  No mass, pulsatile mass, nor hepatosplenomegaly appreciated.  : Circumcised phallus.  Benign scrotum.  No hernia.  Skin: No purpura nor petechia noted.  Extremities/Musculoskeletal: No sign of trauma.  Calves are nontender.  No Homans' sign.  There is trace bilateral leg edema.  Neurologic: Alert & oriented.  Strength and sensation is intact all around.  Psychiatric: Normal affect appropriate for the clinical situation.    LABS  Labs Reviewed   CBC WITH DIFFERENTIAL - Abnormal; Notable for the following components:       Result Value    WBC 11.2 (*)     Neutrophils-Polys 88.10 (*)     Lymphocytes 8.10 (*)     Neutrophils (Absolute) 9.86 (*)     Lymphs (Absolute) 0.90 (*)     All other components within normal limits   COMP METABOLIC PANEL - Abnormal; Notable for the following components:    Glucose 110 (*)     All other components within normal limits   URINALYSIS - Abnormal; Notable for the following components:    Character Cloudy (*)     Ketones Trace (*)     All other components within normal limits   URINE MICROSCOPIC (W/UA) - Abnormal; Notable for the following components:    WBC 0-2 (*)     RBC 2-5 (*)     All other components within normal limits   LIPASE   ESTIMATED GFR         RADIOLOGY/PROCEDURES  I have reviewed the patient's film interpretations myself, and they are read out by the radiologist as:   CT-RENAL COLIC EVALUATION(A/P W/O)   Final Result      There is moderate left hydronephrosis and perinephric edema with a punctate stone at the left ureterovesicular junction.        .    MEDICAL RECORD  I have  "reviewed patient's medical record and pertinent results are listed above.    COURSE & MEDICAL DECISION MAKING  I have reviewed any medical record information, laboratory studies and radiographic results as noted above.  Patient presents with a sharp left-sided flank pain reminiscent of a kidney stone in the past.  CT scan does show this is what he has.  Although there is moderate hydronephrosis and perinephric edema, the stone itself is punctate; it is at the UVJ.  Upon recheck, at 1025, the patient continues to be comfortable and states \"I feel fine.\"  He is given my usual discussion about kidney stones.  Generic instructions on kidney stones, referral for Dr. Mcelroy in urology.  And the following specific instructions:    1. Zofran dissolved under the tongue for nausea.  2. Motrin for pain--this is the best option.  3. Opiates for continued pain, if needed--I would avoid this if possible.  4. Flomax to help ureteral stone passage.  5. Strain urine.  6. Plenty of fluids.  Goal is clear, colorless urine.  7. Call to make follow up appt with urology.  8. Return to ER for symptoms not controlled by medicine, fever, or any turn for the worse.     FINAL IMPRESSION  1. Ureterolithiasis           This dictation was created using voice recognition software.    Electronically signed by: Jn Millard M.D., 6/26/2022 9:10 AM    "

## 2022-06-26 NOTE — ED NOTES
Discharge instructions given to pt. Pt signed narcotic consent form. Pt had no further questions or concerns.

## 2022-06-26 NOTE — DISCHARGE INSTRUCTIONS
Zofran dissolved under the tongue for nausea.  Motrin for pain--this is the best option.  Opiates for continued pain, if needed--I would avoid this if possible.  Flomax to help ureteral stone passage.  Strain urine.  Plenty of fluids.  Goal is clear, colorless urine.  Call to make follow up appt with urology.  Return to ER for symptoms not controlled by medicine, fever, or any turn for the worse.

## 2022-07-13 DIAGNOSIS — E11.8 TYPE 2 DIABETES MELLITUS WITH COMPLICATION, WITHOUT LONG-TERM CURRENT USE OF INSULIN (HCC): ICD-10-CM

## 2022-07-13 LAB — RETINAL SCREEN: NORMAL

## 2022-10-21 ENCOUNTER — TELEPHONE (OUTPATIENT)
Dept: HEALTH INFORMATION MANAGEMENT | Facility: OTHER | Age: 77
End: 2022-10-21

## 2022-10-21 NOTE — TELEPHONE ENCOUNTER
OUTCOME: CALLED PT/ NO VM    PLEASE TRANSFER TO [facility] TO SCHEDULE [phone], WHEN PT RETURNS CALL.

## 2023-01-04 NOTE — TELEPHONE ENCOUNTER
Outcome: VM FULL, TRIED TO SCHEDULE  AWV    Please transfer to [facility] at [phone] when patient returns call.      Attempt # 2   Transfer Note

## 2023-05-02 ENCOUNTER — HOSPITAL ENCOUNTER (OUTPATIENT)
Dept: LAB | Facility: MEDICAL CENTER | Age: 78
End: 2023-05-02
Payer: MEDICARE

## 2023-05-02 ENCOUNTER — OFFICE VISIT (OUTPATIENT)
Dept: URGENT CARE | Facility: PHYSICIAN GROUP | Age: 78
End: 2023-05-02
Payer: MEDICARE

## 2023-05-02 VITALS
DIASTOLIC BLOOD PRESSURE: 74 MMHG | SYSTOLIC BLOOD PRESSURE: 138 MMHG | OXYGEN SATURATION: 97 % | TEMPERATURE: 97.1 F | HEIGHT: 66 IN | RESPIRATION RATE: 20 BRPM | HEART RATE: 77 BPM | WEIGHT: 190 LBS | BODY MASS INDEX: 30.53 KG/M2

## 2023-05-02 DIAGNOSIS — R41.0 CONFUSION: ICD-10-CM

## 2023-05-02 LAB
ALBUMIN SERPL BCP-MCNC: 4.4 G/DL (ref 3.2–4.9)
ALBUMIN/GLOB SERPL: 1.5 G/DL
ALP SERPL-CCNC: 59 U/L (ref 30–99)
ALT SERPL-CCNC: 17 U/L (ref 2–50)
ANION GAP SERPL CALC-SCNC: 10 MMOL/L (ref 7–16)
APPEARANCE UR: CLEAR
AST SERPL-CCNC: 18 U/L (ref 12–45)
BASOPHILS # BLD AUTO: 0.5 % (ref 0–1.8)
BASOPHILS # BLD: 0.05 K/UL (ref 0–0.12)
BILIRUB SERPL-MCNC: 0.6 MG/DL (ref 0.1–1.5)
BILIRUB UR STRIP-MCNC: NEGATIVE MG/DL
BUN SERPL-MCNC: 14 MG/DL (ref 8–22)
CALCIUM ALBUM COR SERPL-MCNC: 9.2 MG/DL (ref 8.5–10.5)
CALCIUM SERPL-MCNC: 9.5 MG/DL (ref 8.5–10.5)
CHLORIDE SERPL-SCNC: 104 MMOL/L (ref 96–112)
CO2 SERPL-SCNC: 26 MMOL/L (ref 20–33)
COLOR UR AUTO: YELLOW
CREAT SERPL-MCNC: 1.08 MG/DL (ref 0.5–1.4)
EOSINOPHIL # BLD AUTO: 0.19 K/UL (ref 0–0.51)
EOSINOPHIL NFR BLD: 2 % (ref 0–6.9)
ERYTHROCYTE [DISTWIDTH] IN BLOOD BY AUTOMATED COUNT: 42.6 FL (ref 35.9–50)
GFR SERPLBLD CREATININE-BSD FMLA CKD-EPI: 71 ML/MIN/1.73 M 2
GLOBULIN SER CALC-MCNC: 3 G/DL (ref 1.9–3.5)
GLUCOSE SERPL-MCNC: 88 MG/DL (ref 65–99)
GLUCOSE UR STRIP.AUTO-MCNC: NEGATIVE MG/DL
HCT VFR BLD AUTO: 46.5 % (ref 42–52)
HGB BLD-MCNC: 15.8 G/DL (ref 14–18)
IMM GRANULOCYTES # BLD AUTO: 0.03 K/UL (ref 0–0.11)
IMM GRANULOCYTES NFR BLD AUTO: 0.3 % (ref 0–0.9)
KETONES UR STRIP.AUTO-MCNC: NEGATIVE MG/DL
LEUKOCYTE ESTERASE UR QL STRIP.AUTO: NEGATIVE
LYMPHOCYTES # BLD AUTO: 1.94 K/UL (ref 1–4.8)
LYMPHOCYTES NFR BLD: 20.4 % (ref 22–41)
MCH RBC QN AUTO: 28.8 PG (ref 27–33)
MCHC RBC AUTO-ENTMCNC: 34 G/DL (ref 33.7–35.3)
MCV RBC AUTO: 84.9 FL (ref 81.4–97.8)
MONOCYTES # BLD AUTO: 0.52 K/UL (ref 0–0.85)
MONOCYTES NFR BLD AUTO: 5.5 % (ref 0–13.4)
NEUTROPHILS # BLD AUTO: 6.77 K/UL (ref 1.82–7.42)
NEUTROPHILS NFR BLD: 71.3 % (ref 44–72)
NITRITE UR QL STRIP.AUTO: NEGATIVE
NRBC # BLD AUTO: 0 K/UL
NRBC BLD-RTO: 0 /100 WBC
PH UR STRIP.AUTO: 6.5 [PH] (ref 5–8)
PLATELET # BLD AUTO: 250 K/UL (ref 164–446)
PMV BLD AUTO: 9.4 FL (ref 9–12.9)
POTASSIUM SERPL-SCNC: 3.9 MMOL/L (ref 3.6–5.5)
PROT SERPL-MCNC: 7.4 G/DL (ref 6–8.2)
PROT UR QL STRIP: NEGATIVE MG/DL
RBC # BLD AUTO: 5.48 M/UL (ref 4.7–6.1)
RBC UR QL AUTO: NORMAL
SODIUM SERPL-SCNC: 140 MMOL/L (ref 135–145)
SP GR UR STRIP.AUTO: 1.01
UROBILINOGEN UR STRIP-MCNC: 0.2 MG/DL
WBC # BLD AUTO: 9.5 K/UL (ref 4.8–10.8)

## 2023-05-02 PROCEDURE — 36415 COLL VENOUS BLD VENIPUNCTURE: CPT

## 2023-05-02 PROCEDURE — 99214 OFFICE O/P EST MOD 30 MIN: CPT

## 2023-05-02 PROCEDURE — 81002 URINALYSIS NONAUTO W/O SCOPE: CPT

## 2023-05-02 PROCEDURE — 80053 COMPREHEN METABOLIC PANEL: CPT

## 2023-05-02 PROCEDURE — 85025 COMPLETE CBC W/AUTO DIFF WBC: CPT

## 2023-05-02 PROCEDURE — 93000 ELECTROCARDIOGRAM COMPLETE: CPT

## 2023-05-02 ASSESSMENT — FIBROSIS 4 INDEX: FIB4 SCORE: 1.24

## 2023-05-02 NOTE — PROGRESS NOTES
Chief Complaint   Patient presents with    Other     [place] of hearing        HISTORY OF PRESENT ILLNESS: Patient is a pleasant 77 y.o. male who presents to urgent care today intermittent confusion for the last 4 weeks since his wife passed away.  Patient's symptoms are pretty vague he states he is unable to use his phone at times.  States he is forgetful.  Denies any suicidal ideation.  He is tearful in the office today.  Currently not established with his primary care provider.  Denies any pain with urination, no major shortness of breath denies any chest pain.    Patient Active Problem List    Diagnosis Date Noted    Encounter for completion of form with patient 11/03/2021    Abnormal kidney function 12/03/2019    Bilateral impacted cerumen 12/02/2019    Skin lesion 09/14/2019    Upper back pain on right side 09/14/2019    Type 2 diabetes mellitus with complication, without long-term current use of insulin (HCC) 12/12/2018    Trigger index finger of left hand 12/12/2018    Left wrist pain 12/12/2018    Major depressive disorder 06/12/2018    Degenerative arthritis of left knee 05/05/2017    Marital problems 12/08/2016    Obesity (BMI 30-39.9) 12/08/2016    Chronic right shoulder pain 12/03/2015    Left hip pain 02/26/2015    Functional constipation 02/26/2015    Personal history of skin cancer 08/22/2014    Pulmonary emphysema (HCC) 01/07/2014    Essential hypertension, benign 06/28/2013    CAD (coronary artery disease), native coronary artery 05/19/2011    Vitamin D deficiency 05/14/2010    Mixed hyperlipidemia     Personal history of malignant neoplasm of bronchus and lung     Personal history of malignant neoplasm of prostate     Osteoarthritis of multiple joints        Allergies:Ampicillin, Clindamycin, and Demerol    Current Outpatient Medications Ordered in Epic   Medication Sig Dispense Refill    aspirin 81 MG EC tablet Take 1 tablet by mouth once daily 90 Tablet 0    albuterol 108 (90 Base) MCG/ACT Aero  Soln inhalation aerosol Inhale 2 Puffs every 6 hours as needed for Shortness of Breath. 1 Each 5    albuterol (PROVENTIL) 2.5mg/3ml Nebu Soln solution for nebulization 3 mL by Nebulization route every four hours as needed for Shortness of Breath. 75 mL 3    ondansetron (ZOFRAN ODT) 4 MG TABLET DISPERSIBLE Take 1 Tablet by mouth every 8 hours as needed for Nausea. (Patient not taking: Reported on 5/2/2023) 12 Tablet 0    ibuprofen (MOTRIN) 600 MG Tab Take 1 Tablet by mouth 3 times a day with meals. As needed for pain. (Patient not taking: Reported on 5/2/2023) 20 Tablet 0    tamsulosin (FLOMAX) 0.4 MG capsule Take 1 Capsule by mouth every day. For ureteral stone passage (Patient not taking: Reported on 5/2/2023) 20 Capsule 0    atorvastatin (LIPITOR) 40 MG Tab TAKE 1/2 (ONE-HALF) TABLET BY MOUTH EVERY DAY AT BEDTIME (Patient not taking: Reported on 5/2/2023) 45 Tablet 3    hydroCHLOROthiazide (HYDRODIURIL) 25 MG Tab Take 1 tablet by mouth once daily (Patient not taking: Reported on 5/2/2023) 90 Tablet 0    metFORMIN (GLUCOPHAGE) 500 MG Tab Take 1 tablet by mouth every day. (Patient not taking: Reported on 5/2/2023) 90 tablet 3    etodolac (LODINE) 500 MG tablet Take 1 tablet by mouth 2 times a day. (Patient not taking: Reported on 5/2/2023) 180 tablet 0    ipratropium (ATROVENT) 17 MCG/ACT Aero Soln Inhale 2 Puffs every 6 hours. (Patient not taking: Reported on 5/2/2023) 1 Each 5    Simethicone (GAS RELIEF EXTRA STRENGTH PO) Take  by mouth as needed. (Patient not taking: Reported on 5/2/2023)      Cholecalciferol (VITAMIN D3) 5000 UNITS Cap Take 1 Cap by mouth every day. (Patient not taking: Reported on 5/2/2023)      OCUVITE PO Take  by mouth every day. WITH ZINC  (Patient not taking: Reported on 5/2/2023)       No current [facility]-ordered facility-administered medications on file.       Past Medical History:   Diagnosis Date    Anesthesia     2002-Wife reported pt flatlined after bronch. NO FURTHER PROBLEMS WITH  "FOLLOWING SURGERIES.     Anxiety     Arm pain, left 1/7/2014    Arthritis     general    Asthma     Inhalers as needed    Breath shortness 4/21/17    States only with exess exertion. No changes to status.    CAD (coronary artery disease)     cad with an inconclusive thallium in 2004,  treated with medical management    Cancer (HCC) 2002, 2006    Lung (2002)-surgery and radiation. Prostate (2006)-brachytherapy    Carcinoma in situ of respiratory system 1998    Upper lung lobectomy and radiation therapy    Cataract 2007    Bilateral phaco with IOL    Colon polyps     NONCANCEROUS COLONIC POLYS 2004,MOST RECENT COLONOSCOPY 2005 WAS NEGATIVE    Coronary atherosclerosis     CONTROLLED    Depression     Diabetes (HCC)     Diverticulitis     TREATED WITH HEMICOLECTOMY    EKG abnormal 2000    ABN EKG W/OLD INFERIOR MI    Emphysema of lung (HCC)     COPD    Essential hypertension, benign 6/28/2013    Essential hypertension, malignant     CONTROLLED    Exposure     ORGANIC SOLVENT EXPOSURE IN THE 1980'S BUT HEAVY DOSES OF TYLENE AND XYLENE    Hand pain, right 5/14/2010    Heart attack (HCC)     High cholesterol     Lung cancer (HCC)     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, LEFT UPPER LOBE LOBECTOMY AND 6 WEEKS OF RADIATION IN 2001    Mixed hyperlipidemia     CONTROLLED    Nephrolithiasis 1967, 1968    Osteoarthrosis involving, or with mention of more than one site, but not specified as generalized, multiple sites     CONTROLLED    Pain 4/21/17    \"all over\"    Personal history of malignant neoplasm of bronchus and lung     STABLE    Personal history of malignant neoplasm of prostate     STABLE    Pneumonia 2015    Also had previously    Prediabetes 1/7/2014    Prostate cancer (HCC) 2006    TREATED WITH BRACHY THERAPY    Spinal fusion     C4-6, SPINAL FUSION PER DR JAY    Unspecified vitamin D deficiency 5/14/2010       Social History     Tobacco Use    Smoking status: Former     Packs/day: 3.00     Years: " "38.00     Pack years: 114.00     Types: Cigarettes     Quit date: 2001     Years since quittin.2    Smokeless tobacco: Never   Vaping Use    Vaping Use: Never used   Substance Use Topics    Alcohol use: Yes     Alcohol/week: 0.0 oz     Comment: occ    Drug use: No       Family Status   Relation Name Status    Mo  Other        adopted no history    Fa  Other        adopted and no history    Neg Hx  (Not Specified)     Family History   Adopted: Yes   Problem Relation Age of Onset    Diabetes Neg Hx        ROS:  Review of Systems   Constitutional: Negative for fever, chills, weight loss, malaise, and fatigue.   HENT: Negative for ear pain, nosebleeds, congestion, sore throat and neck pain.    Eyes: Negative for vision changes.   Neuro: Negative for headache, sensory changes, weakness, seizure, LOC.   Cardiovascular: Negative for chest pain, palpitations, orthopnea and leg swelling.   Respiratory: Negative for cough, sputum production, shortness of breath and wheezing.   Gastrointestinal: Negative for abdominal pain, nausea, vomiting or diarrhea.   Genitourinary: Negative for dysuria, urgency and frequency.  Musculoskeletal: Negative for falls, neck pain, back pain, joint pain, myalgias.   Skin: Negative for rash, diaphoresis.   Psych: Intermittent confusion    Exam:  /74   Pulse 77   Temp 36.2 °C (97.1 °F) (Temporal)   Resp 20   Ht 1.676 m (5' 6\")   Wt 86.2 kg (190 lb)   SpO2 97%   General: well-nourished, well-developed male in NAD  Head: normocephalic, atraumatic  Eyes: PERRLA, no conjunctival injection, acuity grossly intact, lids normal.  Ears: normal shape and symmetry, no tenderness, no discharge. External canals are without any significant edema or erythema. Tympanic membranes are without any inflammation, no effusion. Gross auditory acuity is intact.  Nose: symmetrical without tenderness, no discharge.  Mouth/Throat: reasonable hygiene, no erythema, exudates or tonsillar " enlargement.  Neck: no masses, range of motion within normal limits, no tracheal deviation. No obvious thyroid enlargement.   Lymph: no cervical adenopathy. No supraclavicular adenopathy.   Neuro: alert and oriented. Cranial nerves 1-12 grossly intact. No sensory deficit.   Cardiovascular: regular rate and rhythm. No edema.  Pulmonary: no distress. Chest is symmetrical with respiration, no wheezes, crackles, or rhonchi.   Abdomen: soft, non-tender, no guarding, no hepatosplenomegaly.  Musculoskeletal: no clubbing, appropriate muscle tone, gait is stable.  Skin: warm, dry, intact, no clubbing, no cyanosis, no rashes.   Psych: appropriate mood, affect, judgement.  Intermittent confusion per patient, tearful in the office this is a 77-year-old male comes in today with complaints of intermittent confusion, his symptoms are vague otherwise.  He states he has a difficult time using the phone at times.        Assessment/Plan:  1. Confusion  Referral to establish with [facility] PCP    POCT Urinalysis    CBC WITH DIFFERENTIAL    Comp Metabolic Panel    EKG - Clinic Performed        States this started about 4 weeks ago after his wife passed away.  He denies any current suicidal ideation.  Patient is tearful in the office.  He is currently not taking any medications, he has not established with primary care.  Due to the nature and the onset of his symptoms I do believe these are likely related to depression, however a work-up is warranted as he has not had labs or taken any medications for the last year, CBC, CMP, EKG showed sinus rhythm potential old infarct no ST elevation, POCT urinalysis was ordered, showed trace blood, negative for leukocytes or nitrates today here in the office, referral for primary care to establish care was placed.  Patient advised to please follow-up or continue to seek further care if he continues to get worse or does not improve.    Supportive care, differential diagnoses, and indications for  immediate follow-up discussed with patient.   Pathogenesis of diagnosis discussed including typical length and natural progression.   Instructed to return to clinic or nearest emergency department for any change in condition, further concerns, or worsening of symptoms.  Patient states understanding of the plan of care and discharge instructions.  Instructed to make an appointment, for follow up, with  primary care provider.        Please note that this dictation was created using voice recognition software. I have made every reasonable attempt to correct obvious errors, but I expect that there are errors of grammar and possibly content that I did not discover before finalizing the note.      Lorena ANDERSON

## 2023-06-13 ENCOUNTER — OFFICE VISIT (OUTPATIENT)
Dept: MEDICAL GROUP | Facility: PHYSICIAN GROUP | Age: 78
End: 2023-06-13
Payer: MEDICARE

## 2023-06-13 VITALS
WEIGHT: 187 LBS | OXYGEN SATURATION: 95 % | SYSTOLIC BLOOD PRESSURE: 130 MMHG | DIASTOLIC BLOOD PRESSURE: 70 MMHG | TEMPERATURE: 98 F | HEART RATE: 77 BPM | HEIGHT: 67 IN | BODY MASS INDEX: 29.35 KG/M2 | RESPIRATION RATE: 15 BRPM

## 2023-06-13 DIAGNOSIS — E78.2 MIXED HYPERLIPIDEMIA: ICD-10-CM

## 2023-06-13 DIAGNOSIS — E55.9 VITAMIN D DEFICIENCY: Chronic | ICD-10-CM

## 2023-06-13 DIAGNOSIS — Z85.118 PERSONAL HISTORY OF MALIGNANT NEOPLASM OF BRONCHUS AND LUNG: ICD-10-CM

## 2023-06-13 DIAGNOSIS — E11.8 TYPE 2 DIABETES MELLITUS WITH COMPLICATION, WITHOUT LONG-TERM CURRENT USE OF INSULIN (HCC): ICD-10-CM

## 2023-06-13 DIAGNOSIS — R73.03 PREDIABETES: ICD-10-CM

## 2023-06-13 DIAGNOSIS — N20.0 NEPHROLITHIASIS: ICD-10-CM

## 2023-06-13 DIAGNOSIS — Z63.4 DEATH OF FAMILY MEMBER: ICD-10-CM

## 2023-06-13 DIAGNOSIS — J18.9 PNEUMONIA DUE TO INFECTIOUS ORGANISM, UNSPECIFIED LATERALITY, UNSPECIFIED PART OF LUNG: ICD-10-CM

## 2023-06-13 PROBLEM — F32.9 MAJOR DEPRESSIVE DISORDER: Status: RESOLVED | Noted: 2018-06-12 | Resolved: 2023-06-13

## 2023-06-13 PROBLEM — M54.9 UPPER BACK PAIN ON RIGHT SIDE: Status: RESOLVED | Noted: 2019-09-14 | Resolved: 2023-06-13

## 2023-06-13 PROBLEM — L98.9 SKIN LESION: Status: RESOLVED | Noted: 2019-09-14 | Resolved: 2023-06-13

## 2023-06-13 PROBLEM — H61.23 BILATERAL IMPACTED CERUMEN: Status: RESOLVED | Noted: 2019-12-02 | Resolved: 2023-06-13

## 2023-06-13 PROCEDURE — 3078F DIAST BP <80 MM HG: CPT | Performed by: NURSE PRACTITIONER

## 2023-06-13 PROCEDURE — 99214 OFFICE O/P EST MOD 30 MIN: CPT | Performed by: NURSE PRACTITIONER

## 2023-06-13 PROCEDURE — 3075F SYST BP GE 130 - 139MM HG: CPT | Performed by: NURSE PRACTITIONER

## 2023-06-13 SDOH — SOCIAL STABILITY - SOCIAL INSECURITY: DISSAPEARANCE AND DEATH OF FAMILY MEMBER: Z63.4

## 2023-06-13 ASSESSMENT — FIBROSIS 4 INDEX: FIB4 SCORE: 1.31

## 2023-06-13 NOTE — ASSESSMENT & PLAN NOTE
Patient was seen in the ER in January 2, 2023 for flank pain CT showed stone on the left side in the ureterovesicular junction  He believes he passed it

## 2023-06-13 NOTE — PROGRESS NOTES
"Subjective:     CC:   Chief Complaint   Patient presents with    Establish Care    Follow-Up     labs       HPI:   Jamie presents today with    Nephrolithiasis  Patient was seen in the ER in January 2, 2023 for flank pain CT showed stone on the left side in the ureterovesicular junction  He believes he passed it    Death of family member  Patient's wife passed 2 months ago   he came to the urgent care recently with confusion reports things he used to know how to do are difficult to remember at times  He took a break from golf but is staying busy with bowling         HCC Gap Form    Last edited 06/13/23 17:06 PDT by Cheryl M. Demucha, A.P.R.N. ROS per HPI    Objective:     Exam:  /70   Pulse 77   Temp 36.7 °C (98 °F)   Resp 15   Ht 1.702 m (5' 7\") Comment: pt stated  Wt 84.8 kg (187 lb)   SpO2 95%   BMI 29.29 kg/m²  Body mass index is 29.29 kg/m².    Physical Exam:  Constitutional: Well-developed and well-nourished male. Not diaphoretic. No distress.   Skin: warm, dry, intact, no evidence of rash or concerning lesions  Head: Atraumatic without lesions.  Eyes: Conjunctivae are normal. Pupils are equal, round. No scleral icterus.   Ears:  External ears unremarkable.   Neck: Supple, trachea midline. No thyromegaly present. No cervical or supraclavicular lymphadenopathy.  Cardiovascular: Regular rate and rhythm without murmur.   Pulmonary: Clear to ausculation. Normal effort. No rales, ronchi, or wheezing.  Extremities: No cyanosis, clubbing, erythema, nor edema.   Neurological: Alert and oriented x 3.   Psychiatric:  Behavior, mood, and affect are appropriate.        Assessment & Plan:     77 y.o. male with the following -     1. Mixed hyperlipidemia  - Lipid Profile; Future    2. Vitamin D deficiency  - VITAMIN D,25 HYDROXY (DEFICIENCY); Future    3. Personal history of malignant neoplasm of bronchus and lung    4. Type 2 diabetes mellitus with complication, without long-term current use of " insulin (HCC)  - HEMOGLOBIN A1C; Future    5. Death of family member  Patient is doing as well as to be expected considering he was  to his high school josert and is suffering a great loss I invited him to follow-up in 2 months to see how he is doing and to review labs  He'd like to do a memory test at the time as well          Return in about 3 months (around 9/13/2023) for lab results, gen check in, depression.    Please note that this dictation was created using voice recognition software. I have made every reasonable attempt to correct obvious errors, but I expect that there are errors of grammar and possibly content that I did not discover before finalizing the note.

## 2023-06-14 ENCOUNTER — PATIENT MESSAGE (OUTPATIENT)
Dept: HEALTH INFORMATION MANAGEMENT | Facility: OTHER | Age: 78
End: 2023-06-14

## 2023-06-14 ENCOUNTER — PATIENT OUTREACH (OUTPATIENT)
Dept: HEALTH INFORMATION MANAGEMENT | Facility: OTHER | Age: 78
End: 2023-06-14
Payer: MEDICARE

## 2023-06-14 DIAGNOSIS — I10 ESSENTIAL HYPERTENSION, BENIGN: ICD-10-CM

## 2023-06-14 NOTE — ASSESSMENT & PLAN NOTE
Patient's wife passed 2 months ago   he came to the urgent care recently with confusion reports things he used to know how to do are difficult to remember at times  He took a break from golf but is staying busy with bowling

## 2023-07-19 ENCOUNTER — HOSPITAL ENCOUNTER (OUTPATIENT)
Dept: LAB | Facility: MEDICAL CENTER | Age: 78
End: 2023-07-19
Attending: NURSE PRACTITIONER
Payer: MEDICARE

## 2023-07-19 DIAGNOSIS — E78.2 MIXED HYPERLIPIDEMIA: ICD-10-CM

## 2023-07-19 DIAGNOSIS — E55.9 VITAMIN D DEFICIENCY: Chronic | ICD-10-CM

## 2023-07-19 DIAGNOSIS — E11.8 TYPE 2 DIABETES MELLITUS WITH COMPLICATION, WITHOUT LONG-TERM CURRENT USE OF INSULIN (HCC): ICD-10-CM

## 2023-07-19 LAB
EST. AVERAGE GLUCOSE BLD GHB EST-MCNC: 126 MG/DL
HBA1C MFR BLD: 6 % (ref 4–5.6)

## 2023-07-19 PROCEDURE — 80061 LIPID PANEL: CPT

## 2023-07-19 PROCEDURE — 83036 HEMOGLOBIN GLYCOSYLATED A1C: CPT | Mod: GA

## 2023-07-19 PROCEDURE — 82306 VITAMIN D 25 HYDROXY: CPT

## 2023-07-19 PROCEDURE — 36415 COLL VENOUS BLD VENIPUNCTURE: CPT

## 2023-07-20 LAB
25(OH)D3 SERPL-MCNC: 26 NG/ML (ref 30–100)
CHOLEST SERPL-MCNC: 174 MG/DL (ref 100–199)
HDLC SERPL-MCNC: 43 MG/DL
LDLC SERPL CALC-MCNC: 78 MG/DL
TRIGL SERPL-MCNC: 266 MG/DL (ref 0–149)

## 2023-09-12 ENCOUNTER — TELEPHONE (OUTPATIENT)
Dept: MEDICAL GROUP | Facility: PHYSICIAN GROUP | Age: 78
End: 2023-09-12

## 2023-09-12 ENCOUNTER — OFFICE VISIT (OUTPATIENT)
Dept: MEDICAL GROUP | Facility: PHYSICIAN GROUP | Age: 78
End: 2023-09-12
Payer: MEDICARE

## 2023-09-12 VITALS
SYSTOLIC BLOOD PRESSURE: 124 MMHG | HEART RATE: 66 BPM | BODY MASS INDEX: 30.72 KG/M2 | HEIGHT: 65 IN | DIASTOLIC BLOOD PRESSURE: 70 MMHG | RESPIRATION RATE: 18 BRPM | WEIGHT: 184.4 LBS | TEMPERATURE: 97.9 F | OXYGEN SATURATION: 98 %

## 2023-09-12 DIAGNOSIS — E78.2 MIXED HYPERLIPIDEMIA: ICD-10-CM

## 2023-09-12 DIAGNOSIS — E11.8 TYPE 2 DIABETES MELLITUS WITH COMPLICATION, WITHOUT LONG-TERM CURRENT USE OF INSULIN (HCC): ICD-10-CM

## 2023-09-12 DIAGNOSIS — R41.89 COGNITIVE CHANGES: ICD-10-CM

## 2023-09-12 DIAGNOSIS — E55.9 VITAMIN D DEFICIENCY: Chronic | ICD-10-CM

## 2023-09-12 LAB — RETINAL SCREEN: NORMAL

## 2023-09-12 PROCEDURE — 99214 OFFICE O/P EST MOD 30 MIN: CPT | Performed by: NURSE PRACTITIONER

## 2023-09-12 PROCEDURE — 3078F DIAST BP <80 MM HG: CPT | Performed by: NURSE PRACTITIONER

## 2023-09-12 PROCEDURE — 3074F SYST BP LT 130 MM HG: CPT | Performed by: NURSE PRACTITIONER

## 2023-09-12 ASSESSMENT — PATIENT HEALTH QUESTIONNAIRE - PHQ9: CLINICAL INTERPRETATION OF PHQ2 SCORE: 0

## 2023-09-12 ASSESSMENT — FIBROSIS 4 INDEX: FIB4 SCORE: 1.31

## 2023-09-12 NOTE — TELEPHONE ENCOUNTER
I let the patient know that you would like to contact him to be part of the chronic care management program     he is open to that     He's not great w/the phone but his son is listed as a contact; they live in the same home but have a challenging relationship

## 2023-09-12 NOTE — PROGRESS NOTES
"Subjective:     CC:   Chief Complaint   Patient presents with    Results     Labs     Orders Needed     Requesting a memory test.- concerns about memory        HPI:   Jamie presents today with  his neighbor for f/up    Vitamin D deficiency  Most recent vitamin D lab was 26 advised patient 4000 a day w/serum goal of 50-60    Type 2 diabetes mellitus with complication, without long-term current use of insulin (HCC)  A1c 6.0 on 7/19  Next due 2/24    Cognitive changes  At his last appointment in June patient told me he lost his wife 2 months prior and he was having trouble remembering the things he normally knew how to do   we scheduled a memory test for today    Neighbor is here today with an Alzheimer's checklist and reports that patient has 8/10 of the items    Has trouble learning new things  Doesn't keep lists      HCC Gap Form    Last edited 09/12/23 15:16 PDT by Cheryl M. Demucha, A.P.R.N. ROS per HPI    Objective:     Exam:  /70 (BP Location: Left arm, Patient Position: Sitting, BP Cuff Size: Adult long)   Pulse 66   Temp 36.6 °C (97.9 °F) (Temporal)   Resp 18   Ht 1.651 m (5' 5\")   Wt 83.6 kg (184 lb 6.4 oz)   SpO2 98%   BMI 30.69 kg/m²  Body mass index is 30.69 kg/m².    Physical Exam:  Constitutional: Well-developed and well-nourished male Not diaphoretic. No distress.   Skin: warm, dry, intact, no evidence of rash or concerning lesions  Head: Atraumatic without lesions.  Eyes: Conjunctivae are normal. Pupils are equal, round. No scleral icterus.   Ears:  External ears unremarkable.   Neck: Supple, trachea midline. No thyromegaly present. No cervical or supraclavicular lymphadenopathy.  Cardiovascular: Regular rate and rhythm without murmur.   Pulmonary: Clear to ausculation. Normal effort. No rales, ronchi, or wheezing.  Extremities: No cyanosis, clubbing, erythema, nor edema.   Neurological: Alert and oriented x 3.   Psychiatric:  Behavior, mood, and affect are " appropriate.        Assessment & Plan:     77 y.o. male with the following -     1. Vitamin D deficiency  recommended 4000 IUs/day with a goal of increasing from 26 to 50-60    2. Type 2 diabetes mellitus with complication, without long-term current use of insulin (HCC)  - Microalbumin Creat Ratio Urine - Clinic Collect; Future  - Diabetic Monofilament Lower Extremity Exam  - HEMOGLOBIN A1C; Future    3. Cognitive changes  - Referral to Neurology memory clinic     4. Mixed hyperlipidemia  - Lipid Profile; Future  Discussed dietary changes to make including adding fish oil to his diet and watching the processed foods      Return in about 6 months (around 3/12/2024).    Please note that this dictation was created using voice recognition software. I have made every reasonable attempt to correct obvious errors, but I expect that there are errors of grammar and possibly content that I did not discover before finalizing the note.

## 2023-09-12 NOTE — ASSESSMENT & PLAN NOTE
At his last appointment in June patient told me he lost his wife 2 months prior and he was having trouble remembering the things he normally knew how to do   we scheduled a memory test for today    Neighbor is here today with an Alzheimer's checklist and reports that patient has 8/10 of the items    Has trouble learning new things  Doesn't keep lists

## 2023-09-13 ENCOUNTER — PATIENT OUTREACH (OUTPATIENT)
Dept: HEALTH INFORMATION MANAGEMENT | Facility: OTHER | Age: 78
End: 2023-09-13
Payer: MEDICARE

## 2023-09-13 DIAGNOSIS — I10 ESSENTIAL HYPERTENSION, BENIGN: ICD-10-CM

## 2023-09-13 DIAGNOSIS — E78.2 MIXED HYPERLIPIDEMIA: ICD-10-CM

## 2023-09-13 DIAGNOSIS — E11.8 TYPE 2 DIABETES MELLITUS WITH COMPLICATION, WITHOUT LONG-TERM CURRENT USE OF INSULIN (HCC): ICD-10-CM

## 2023-09-13 DIAGNOSIS — I25.119 CORONARY ARTERY DISEASE INVOLVING NATIVE CORONARY ARTERY OF NATIVE HEART WITH ANGINA PECTORIS (HCC): ICD-10-CM

## 2023-09-13 NOTE — PROGRESS NOTES
9/13/23 @1638 This RN left message on patient's voicemail to introduce CCM program. Left detailed contact information for return call.     09/14/23 1630 This RN received phone call from friend Jn regarding message left to introduce CCM program. Jn will discuss with patient and son to see if he is interested in the program and return call.     09/18/23 @ 5971 This RN received a return call from patient and would like to decline CCM program at this time. RN confirmed CCM phone number to patient if any other questions or concerns.

## 2023-09-19 ENCOUNTER — HOSPITAL ENCOUNTER (OUTPATIENT)
Dept: LAB | Facility: MEDICAL CENTER | Age: 78
End: 2023-09-19
Attending: NURSE PRACTITIONER
Payer: MEDICARE

## 2023-09-19 DIAGNOSIS — E78.2 MIXED HYPERLIPIDEMIA: ICD-10-CM

## 2023-09-19 DIAGNOSIS — E11.8 TYPE 2 DIABETES MELLITUS WITH COMPLICATION, WITHOUT LONG-TERM CURRENT USE OF INSULIN (HCC): ICD-10-CM

## 2023-09-19 LAB
CHOLEST SERPL-MCNC: 168 MG/DL (ref 100–199)
CREAT UR-MCNC: 184.6 MG/DL
FASTING STATUS PATIENT QL REPORTED: NORMAL
HDLC SERPL-MCNC: 42 MG/DL
LDLC SERPL CALC-MCNC: 105 MG/DL
MICROALBUMIN UR-MCNC: 1.7 MG/DL
MICROALBUMIN/CREAT UR: 9 MG/G (ref 0–30)
TRIGL SERPL-MCNC: 104 MG/DL (ref 0–149)

## 2023-09-19 PROCEDURE — 82043 UR ALBUMIN QUANTITATIVE: CPT

## 2023-09-19 PROCEDURE — 80061 LIPID PANEL: CPT

## 2023-09-19 PROCEDURE — 82570 ASSAY OF URINE CREATININE: CPT

## 2023-09-19 PROCEDURE — 36415 COLL VENOUS BLD VENIPUNCTURE: CPT

## 2023-09-20 ENCOUNTER — TELEPHONE (OUTPATIENT)
Dept: HEALTH INFORMATION MANAGEMENT | Facility: OTHER | Age: 78
End: 2023-09-20
Payer: MEDICARE

## 2023-10-14 ENCOUNTER — HOSPITAL ENCOUNTER (EMERGENCY)
Facility: MEDICAL CENTER | Age: 78
End: 2023-10-14
Attending: EMERGENCY MEDICINE
Payer: MEDICARE

## 2023-10-14 VITALS
HEART RATE: 75 BPM | TEMPERATURE: 97.5 F | RESPIRATION RATE: 16 BRPM | BODY MASS INDEX: 28.88 KG/M2 | HEIGHT: 67 IN | DIASTOLIC BLOOD PRESSURE: 81 MMHG | SYSTOLIC BLOOD PRESSURE: 140 MMHG | OXYGEN SATURATION: 94 % | WEIGHT: 184 LBS

## 2023-10-14 DIAGNOSIS — S87.82XA CRUSHING INJURY OF LEFT LOWER EXTREMITY, INITIAL ENCOUNTER: ICD-10-CM

## 2023-10-14 PROCEDURE — 99284 EMERGENCY DEPT VISIT MOD MDM: CPT

## 2023-10-14 ASSESSMENT — FIBROSIS 4 INDEX: FIB4 SCORE: 1.31
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
## 2024-01-02 ENCOUNTER — HOSPITAL ENCOUNTER (OUTPATIENT)
Dept: LAB | Facility: MEDICAL CENTER | Age: 79
End: 2024-01-02
Attending: FAMILY MEDICINE
Payer: MEDICARE

## 2024-01-02 LAB
25(OH)D3 SERPL-MCNC: 29 NG/ML (ref 30–100)
ALBUMIN SERPL BCP-MCNC: 4.2 G/DL (ref 3.2–4.9)
ALBUMIN/GLOB SERPL: 1.4 G/DL
ALP SERPL-CCNC: 62 U/L (ref 30–99)
ALT SERPL-CCNC: 16 U/L (ref 2–50)
ANION GAP SERPL CALC-SCNC: 12 MMOL/L (ref 7–16)
AST SERPL-CCNC: 16 U/L (ref 12–45)
BASOPHILS # BLD AUTO: 0.3 % (ref 0–1.8)
BASOPHILS # BLD: 0.03 K/UL (ref 0–0.12)
BILIRUB SERPL-MCNC: 0.6 MG/DL (ref 0.1–1.5)
BUN SERPL-MCNC: 12 MG/DL (ref 8–22)
CALCIUM ALBUM COR SERPL-MCNC: 9.3 MG/DL (ref 8.5–10.5)
CALCIUM SERPL-MCNC: 9.5 MG/DL (ref 8.5–10.5)
CHLORIDE SERPL-SCNC: 104 MMOL/L (ref 96–112)
CHOLEST SERPL-MCNC: 158 MG/DL (ref 100–199)
CO2 SERPL-SCNC: 24 MMOL/L (ref 20–33)
CREAT SERPL-MCNC: 1.04 MG/DL (ref 0.5–1.4)
CREAT UR-MCNC: 84.58 MG/DL
EOSINOPHIL # BLD AUTO: 0.15 K/UL (ref 0–0.51)
EOSINOPHIL NFR BLD: 1.6 % (ref 0–6.9)
ERYTHROCYTE [DISTWIDTH] IN BLOOD BY AUTOMATED COUNT: 42.9 FL (ref 35.9–50)
EST. AVERAGE GLUCOSE BLD GHB EST-MCNC: 114 MG/DL
GFR SERPLBLD CREATININE-BSD FMLA CKD-EPI: 74 ML/MIN/1.73 M 2
GLOBULIN SER CALC-MCNC: 3 G/DL (ref 1.9–3.5)
GLUCOSE SERPL-MCNC: 87 MG/DL (ref 65–99)
HBA1C MFR BLD: 5.6 % (ref 4–5.6)
HCT VFR BLD AUTO: 49.6 % (ref 42–52)
HDLC SERPL-MCNC: 48 MG/DL
HGB BLD-MCNC: 16.5 G/DL (ref 14–18)
IMM GRANULOCYTES # BLD AUTO: 0.02 K/UL (ref 0–0.11)
IMM GRANULOCYTES NFR BLD AUTO: 0.2 % (ref 0–0.9)
LDLC SERPL CALC-MCNC: 86 MG/DL
LYMPHOCYTES # BLD AUTO: 1.74 K/UL (ref 1–4.8)
LYMPHOCYTES NFR BLD: 18.2 % (ref 22–41)
MCH RBC QN AUTO: 28.5 PG (ref 27–33)
MCHC RBC AUTO-ENTMCNC: 33.3 G/DL (ref 32.3–36.5)
MCV RBC AUTO: 85.7 FL (ref 81.4–97.8)
MICROALBUMIN UR-MCNC: <1.2 MG/DL
MICROALBUMIN/CREAT UR: NORMAL MG/G (ref 0–30)
MONOCYTES # BLD AUTO: 0.41 K/UL (ref 0–0.85)
MONOCYTES NFR BLD AUTO: 4.3 % (ref 0–13.4)
NEUTROPHILS # BLD AUTO: 7.23 K/UL (ref 1.82–7.42)
NEUTROPHILS NFR BLD: 75.4 % (ref 44–72)
NRBC # BLD AUTO: 0 K/UL
NRBC BLD-RTO: 0 /100 WBC (ref 0–0.2)
PLATELET # BLD AUTO: 326 K/UL (ref 164–446)
PMV BLD AUTO: 10.2 FL (ref 9–12.9)
POTASSIUM SERPL-SCNC: 4.3 MMOL/L (ref 3.6–5.5)
PROT SERPL-MCNC: 7.2 G/DL (ref 6–8.2)
PSA SERPL-MCNC: 0.03 NG/ML (ref 0–4)
RBC # BLD AUTO: 5.79 M/UL (ref 4.7–6.1)
SODIUM SERPL-SCNC: 140 MMOL/L (ref 135–145)
T PALLIDUM AB SER QL IA: NORMAL
T4 SERPL-MCNC: 8.2 UG/DL (ref 4–12)
TRIGL SERPL-MCNC: 121 MG/DL (ref 0–149)
TSH SERPL DL<=0.005 MIU/L-ACNC: 2.08 UIU/ML (ref 0.38–5.33)
VIT B12 SERPL-MCNC: <150 PG/ML (ref 211–911)
WBC # BLD AUTO: 9.6 K/UL (ref 4.8–10.8)

## 2024-01-02 PROCEDURE — 86780 TREPONEMA PALLIDUM: CPT | Mod: GA

## 2024-01-02 PROCEDURE — 82043 UR ALBUMIN QUANTITATIVE: CPT

## 2024-01-02 PROCEDURE — 82607 VITAMIN B-12: CPT

## 2024-01-02 PROCEDURE — 36415 COLL VENOUS BLD VENIPUNCTURE: CPT

## 2024-01-02 PROCEDURE — 83036 HEMOGLOBIN GLYCOSYLATED A1C: CPT | Mod: GA

## 2024-01-02 PROCEDURE — 80053 COMPREHEN METABOLIC PANEL: CPT

## 2024-01-02 PROCEDURE — 82570 ASSAY OF URINE CREATININE: CPT

## 2024-01-02 PROCEDURE — 84207 ASSAY OF VITAMIN B-6: CPT

## 2024-01-02 PROCEDURE — 84443 ASSAY THYROID STIM HORMONE: CPT

## 2024-01-02 PROCEDURE — 82306 VITAMIN D 25 HYDROXY: CPT | Mod: GA

## 2024-01-02 PROCEDURE — 84436 ASSAY OF TOTAL THYROXINE: CPT

## 2024-01-02 PROCEDURE — 84153 ASSAY OF PSA TOTAL: CPT

## 2024-01-02 PROCEDURE — 80061 LIPID PANEL: CPT

## 2024-01-02 PROCEDURE — 85025 COMPLETE CBC W/AUTO DIFF WBC: CPT

## 2024-01-05 LAB — VIT B6 SERPL-MCNC: 14.8 NMOL/L (ref 20–125)

## 2024-01-23 ENCOUNTER — OFFICE VISIT (OUTPATIENT)
Dept: NEUROLOGY | Facility: MEDICAL CENTER | Age: 79
End: 2024-01-23
Attending: PSYCHIATRY & NEUROLOGY
Payer: MEDICARE

## 2024-01-23 VITALS
WEIGHT: 164.6 LBS | BODY MASS INDEX: 25.83 KG/M2 | RESPIRATION RATE: 14 BRPM | HEART RATE: 70 BPM | SYSTOLIC BLOOD PRESSURE: 114 MMHG | HEIGHT: 67 IN | DIASTOLIC BLOOD PRESSURE: 58 MMHG | OXYGEN SATURATION: 97 %

## 2024-01-23 DIAGNOSIS — F03.A11: ICD-10-CM

## 2024-01-23 DIAGNOSIS — F03.A3 MILD DEMENTIA WITH MOOD DISTURBANCE, UNSPECIFIED DEMENTIA TYPE (HCC): Primary | ICD-10-CM

## 2024-01-23 DIAGNOSIS — F22 PARANOIA (HCC): ICD-10-CM

## 2024-01-23 DIAGNOSIS — G47.52 REM SLEEP BEHAVIOR DISORDER: ICD-10-CM

## 2024-01-23 DIAGNOSIS — R41.3 DECLINE IN VERBAL MEMORY: ICD-10-CM

## 2024-01-23 DIAGNOSIS — E55.9 VITAMIN D DEFICIENCY: Chronic | ICD-10-CM

## 2024-01-23 DIAGNOSIS — E53.8 VITAMIN B12 DEFICIENCY: ICD-10-CM

## 2024-01-23 PROCEDURE — 99212 OFFICE O/P EST SF 10 MIN: CPT | Performed by: PSYCHIATRY & NEUROLOGY

## 2024-01-23 PROCEDURE — 3074F SYST BP LT 130 MM HG: CPT | Performed by: PSYCHIATRY & NEUROLOGY

## 2024-01-23 PROCEDURE — 99204 OFFICE O/P NEW MOD 45 MIN: CPT | Performed by: PSYCHIATRY & NEUROLOGY

## 2024-01-23 PROCEDURE — 3078F DIAST BP <80 MM HG: CPT | Performed by: PSYCHIATRY & NEUROLOGY

## 2024-01-23 RX ORDER — VITAMIN B COMPLEX
2000 TABLET ORAL DAILY
COMMUNITY

## 2024-01-23 RX ORDER — DONEPEZIL HYDROCHLORIDE 10 MG/1
TABLET, FILM COATED ORAL
Qty: 90 TABLET | Refills: 6 | Status: SHIPPED | OUTPATIENT
Start: 2024-01-23 | End: 2024-12-30

## 2024-01-23 RX ORDER — MELATONIN 5 MG/15 ML
1000 LIQUID (ML) ORAL DAILY
COMMUNITY

## 2024-01-23 RX ORDER — CETIRIZINE HYDROCHLORIDE 10 MG/1
10 TABLET ORAL DAILY
COMMUNITY

## 2024-01-23 RX ORDER — PYRIDOXINE HCL (VITAMIN B6) 50 MG
50 TABLET ORAL
COMMUNITY

## 2024-01-23 ASSESSMENT — PATIENT HEALTH QUESTIONNAIRE - PHQ9: CLINICAL INTERPRETATION OF PHQ2 SCORE: 0

## 2024-01-23 ASSESSMENT — FIBROSIS 4 INDEX: FIB4 SCORE: 0.93

## 2024-01-23 NOTE — PROGRESS NOTES
"Reason for Neurology Consult:  Concern for  Dementia    History of present illness:    Jamie Pacheco 78 y.o. right handed gentleman who is here with her with son and grand daughter.  Grew up in the [place] area where he went to [employer] and went to the [employer] ([place] He was professional  and .  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 He has since father's day of last year, he was having more difficulty with word recall and reduced word fluency.  Man is aware of his word fluency is reduced and at times he gets \"stuck\" with his word retrieval. He knows usually of what he wants to say but can't get it out.    No concussion(s), stroke events, seizure(s)/epilepsy known.    No hallucinations in the last 3-6 months or so but in the last 6-8 months he has periodically thought that someone was into the house and taking furniture.  He is aware of someone occasionally walking down his hallway (random hallucinations) that are lasting 5-10 minutes and occurring 3 days a week.    Average Sleep- infrequently has had a few vivid dreams since 2023. He talks loudly in his sleep (randomly talking to people of various theme)> this has been occurring 3-5 nights a week.    Son has noticed that he seems more difficulty with confusion about where he is at or what he is doing which has been occurring since he has been staying with his son and granddaughter (around ).        Quit smoker > 3-4 packs per day  x 30-35 minutes and quit 21  years ago.    Family Hx:  Adopted- little known about family.        Patient Active Problem List    Diagnosis Date Noted    " Cognitive changes 09/12/2023    Death of family member 06/13/2023    Nephrolithiasis     Encounter for completion of form with patient 11/03/2021    Abnormal kidney function 12/03/2019    Type 2 diabetes mellitus with complication, without long-term current use of insulin (HCC) 12/12/2018    Trigger index finger of left hand 12/12/2018    Left wrist pain 12/12/2018    Degenerative arthritis of left knee 05/05/2017    Obesity (BMI 30-39.9) 12/08/2016    Chronic right shoulder pain 12/03/2015    Left hip pain 02/26/2015    Functional constipation 02/26/2015    Personal history of skin cancer 08/22/2014    Essential hypertension, benign 06/28/2013    CAD (coronary artery disease), native coronary artery 05/19/2011    Vitamin D deficiency 05/14/2010    Mixed hyperlipidemia     Personal history of malignant neoplasm of bronchus and lung     Personal history of malignant neoplasm of prostate     Osteoarthritis of multiple joints        Past medical history:   Past Medical History:   Diagnosis Date    Anesthesia     2002-Wife reported pt flatlined after bronch. NO FURTHER PROBLEMS WITH FOLLOWING SURGERIES.     Anxiety     Arm pain, left 1/7/2014    Arthritis     general    Asthma     Inhalers as needed    Bilateral impacted cerumen 12/2/2019    Breath shortness 4/21/17    States only with exess exertion. No changes to status.    CAD (coronary artery disease)     cad with an inconclusive thallium in 2004,  treated with medical management    Cancer (HCC) 2002, 2006    Lung (2002)-surgery and radiation. Prostate (2006)-brachytherapy    Carcinoma in situ of respiratory system 1998    Upper lung lobectomy and radiation therapy    Cataract 2007    Bilateral phaco with IOL    Colon polyps     NONCANCEROUS COLONIC POLYS 2004,MOST RECENT COLONOSCOPY 2005 WAS NEGATIVE    Coronary atherosclerosis     CONTROLLED    Depression     Diabetes (HCC)     Diverticulitis     TREATED WITH HEMICOLECTOMY    EKG abnormal 2000    ABN EKG W/OLD  "INFERIOR MI    Emphysema of lung (HCC)     COPD    Essential hypertension, benign 6/28/2013    Essential hypertension, malignant     CONTROLLED    Exposure     ORGANIC SOLVENT EXPOSURE IN THE 1980'S BUT HEAVY DOSES OF TYLENE AND XYLENE    Hand pain, right 5/14/2010    Heart attack (HCC)     High cholesterol     Lung cancer (HCC)     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, LEFT UPPER LOBE LOBECTOMY AND 6 WEEKS OF RADIATION IN 2001    Major depressive disorder 6/12/2018    Marital problems 12/8/2016    Mixed hyperlipidemia     CONTROLLED    Nephrolithiasis 1967, 1968    Osteoarthrosis involving, or with mention of more than one site, but not specified as generalized, multiple sites     CONTROLLED    Pain 4/21/17    \"all over\"    Personal history of malignant neoplasm of bronchus and lung     STABLE    Personal history of malignant neoplasm of prostate     STABLE    Pneumonia 2015    Also had previously    Prediabetes 1/7/2014    Prostate cancer (HCC) 2006    TREATED WITH BRACHY THERAPY    Skin lesion 9/14/2019    Spinal fusion     C4-6, SPINAL FUSION PER DR JAY    Unspecified vitamin D deficiency 5/14/2010    Upper back pain on right side 9/14/2019       Past surgical history:   Past Surgical History:   Procedure Laterality Date    KNEE ARTHROPLASTY TOTAL Left 5/5/2017    Procedure: KNEE ARTHROPLASTY TOTAL;  Surgeon: Ermias Griffith M.D.;  Location: SURGERY [facility];  Service:     KNEE ARTHROSCOPY Right 2009    meniscus tear    TRIGGER FINGER RELEASE Right 2009    index finger    BICEPS TENDON REPAIR Right 2008    CERVICAL DISK AND FUSION ANTERIOR  2008    C4-C5    CATARACT PHACO WITH IOL Bilateral 2007    BRACHY THERAPY  2006    Prostate CA    KNEE ARTHROSCOPY Left 2003    BRONCHOSCOPY  2002    Cancer    LOBECTOMY Left 2002    Upper lung bronchial tube for CA    SIGMOID COLECTOMY  2001    secondary to ruptured diverticulitis    DENTAL EXTRACTION(S)  1969    wisdom teeth    COLONOSCOPY  " -present    every 3 years    HERNIA REPAIR  ,     Abdominal incisional hernia,  with mesh    TONSILLECTOMY  as child         Social history:   Social History     Socioeconomic History    Marital status:      Spouse name: Not on file    Number of children: Not on file    Years of education: Not on file    Highest education level: Not on file   Occupational History    Not on file   Tobacco Use    Smoking status: Former     Current packs/day: 0.00     Average packs/day: 3.0 packs/day for 38.0 years (114.0 ttl pk-yrs)     Types: Cigarettes     Start date: 1963     Quit date: 2001     Years since quittin.0    Smokeless tobacco: Never   Vaping Use    Vaping Use: Never used   Substance and Sexual Activity    Alcohol use: Yes     Alcohol/week: 0.0 oz     Comment: occ    Drug use: No    Sexual activity: Not Currently     Partners: Female     Birth control/protection: Post-Menopausal     Comment: , bike repair    Other Topics Concern     Service Not Asked    Blood Transfusions Not Asked    Caffeine Concern Not Asked    Occupational Exposure Not Asked    Hobby Hazards Not Asked    Sleep Concern Not Asked    Stress Concern Not Asked    Weight Concern Not Asked    Special Diet Not Asked    Back Care Not Asked    Exercise Yes     Comment: bikes and hikes most days    Bike Helmet Not Asked    Seat Belt Not Asked    Self-Exams Not Asked   Social History Narrative    Not on file     Social Determinants of Health     Financial Resource Strain: Not on file   Food Insecurity: Not on file   Transportation Needs: Not on file   Physical Activity: Not on file   Stress: Not on file   Social Connections: Not on file   Intimate Partner Violence: Not on file   Housing Stability: Not on file       Family history:   Family History   Adopted: Yes   Problem Relation Age of Onset    Diabetes Neg Hx          Current medications:   Current Outpatient Medications   Medication     "multivitamin Tab    Cyanocobalamin ([employer] B-12) 1000 MCG/15ML Liquid    Pyridoxine HCl (B-6) 50 MG Tab    vitamin D3 (CHOLECALCIFEROL) 1000 Unit (25 mcg) Tab    cetirizine (ZYRTEC) 10 MG Tab    albuterol 108 (90 Base) MCG/ACT Aero Soln inhalation aerosol    albuterol (PROVENTIL) 2.5mg/3ml Nebu Soln solution for nebulization     No current facility-administered medications for this visit.       Medication Allergy:  Allergies   Allergen Reactions    Ampicillin      CAUSED A CHILDHOOD REACTION    Clindamycin Diarrhea    Demerol      CAUSES ECCHYMOSIS AND IS INEFFECTIVE           Physical examination:   Vitals:    01/23/24 1456   BP: 114/58   BP Location: Left arm   Patient Position: Sitting   BP Cuff Size: Adult   Pulse: 70   Resp: 14   SpO2: 97%   Weight: 74.7 kg (164 lb 9.6 oz)   Height: 1.702 m (5' 7\")       Normal cephalic atraumatic.  There is full range of movement around the neck in all directions without restrictions or discrete pain evoked triggers.  No lower extremity edema.      Neurological  Exam:      [place] Cognitive Assessment (MOCA) Version 7.1    Years of Education: 4 years college    TOTAL SCORE: 8/30  (to be scanned into the MEDIA section in the E.M.R.)          Mental status: Awake, alert and person, place, and situation. (Did not know the month or year or day of week> \" it's Saturday\"Normal attention and concentration.  Did not appear/act combative,irritable,anxious,paranoid/delusional or aggressive to or with me.    Speech and language: Speech is fluent without errors, clear, intact to repetition, and intact to naming.     Follows 3 step motor commands in sequence without significant delay and correctly.    Cranial nerve exam:  II: Pupils are equally round and reactive to light. Visual fields are intact by confrontation.  III, IV, VI: EOMI, no diplopia, no ptosis.  V: Sensation to light touch is normal over V1-3 distributions bilaterally.  .  VII: Facial movements are symmetrical. There is no " facial droop. .  VIII: Hearing intact to soft speech and finger rub bilaterally  IX: Palate elevates symmetrically, uvula is midline. Dysarthria is not present.  XI: Shoulder shrug are symmetrical and strong.   XII: Tongue protrudes midline.      Motor exam:  Muscle tone is normal in all 4 limbs. and No abnormal movements appreciated.    Muscle strength:    Neck Flexors/Extensors: 5/5       Right  Left  Deltoid   5/5  5/5      Biceps   5/5  5/5  Triceps             5/5  5/5   Wrist extensors 5/5  5/5  Wrist flexors  5/5  5/5     5/5  5/5  Interossei  5/5  5/5  Thenar (APB)  5/5  5/5   Hip flexors  5/5  5/5  Quadriceps  5/5  5/5    Hamstrings  5/5  5/5  Dorsiflexors  5/5  5/5  Plantarflexors  5/5  5/5  Toe extension  5/5  5/5      Sensory exam:    Vibratory and Proprioception normal at great toes.      Reflexes:       Right  Left  Biceps   2/4  2/4  Triceps              2/4  2/4  Brachioradialis 2/4  2/4  Knee jerk  2/4  2/4  Ankle jerk  2/4  2/4     Frontal release signs are absent    bilaterally toes are downgoing to plantar stimulation..    Coordination (finger-to-nose, heel/knee/shin, rapid alternating movements) was normal.     There was no ataxia, no tremors, and no dysmetria.     Station and gait > easily stands up from exam chair without retropulsion,veering,leaning,swaying (to either side).     No rombergism.    Negative Pull Test.    Labs and Tests:       1 HM Topic            Component  Ref Range & Units 3 wk ago  (1/2/24) 8 mo ago  (5/2/23) 8 mo ago  (5/2/23) 1 yr ago  (6/26/22) 3 yr ago  (1/14/21) 3 yr ago  (7/9/20) 4 yr ago  (1/9/20)   Sodium  135 - 145 mmol/L 140  140 140 141 141 141   Potassium  3.6 - 5.5 mmol/L 4.3  3.9 3.7 3.9 4.0 4.0   Chloride  96 - 112 mmol/L 104  104 105 104 104 103   Co2  20 - 33 mmol/L 24  26 23 25 24 29   Anion Gap  7.0 - 16.0 12.0  10.0 12.0 12.0 13.0 9.0 R   Glucose  65 - 99 mg/dL 87  88 110 High  119 High  108 High  100 High    Bun  8 - 22 mg/dL 12  14 12 17 17 15    Creatinine  0.50 - 1.40 mg/dL 1.04  1.08 1.19 1.23 1.10 1.30   Calcium  8.5 - 10.5 mg/dL 9.5  9.5 8.8 9.7 9.4 9.3   Correct Calcium  8.5 - 10.5 mg/dL 9.3 9.2        AST(SGOT)  12 - 45 U/L 16  18 14 20 15 19   ALT(SGPT)  2 - 50 U/L 16  17 11 25 19 20   Alkaline Phosphatase  30 - 99 U/L 62  59 60 59 50 50   Total Bilirubin  0.1 - 1.5 mg/dL 0.6  0.6 0.7 0.6 0.5 0.7   Albumin  3.2 - 4.9 g/dL 4.2  4.4 3.8 4.4 4.2 4.5   Total Protein  6.0 - 8.2 g/dL 7.2  7.4 6.6 7.5 6.8 7.3          0 Result Notes       1 HM Topic            Component  Ref Range & Units 3 wk ago  (1/2/24) 6 mo ago  (7/19/23) 3 yr ago  (1/14/21) 3 yr ago  (7/9/20) 4 yr ago  (9/9/19) 4 yr ago  (2/20/19) 5 yr ago  (11/20/18)   Glycohemoglobin  4.0 - 5.6 % 5.6 6.0 High  CM 6.2 High  R, CM 5.9 High  R, CM 5.6 R, CM 6.0 High  R, CM 6.8 High  R, CM   Comment: Increased risk for diabetes:  5.7 -6.4%  Diabetes:  >6.4%  Glycemic control for adults with diabetes:  <7.0%    The above interpretations are per ADA guidelines.  Diagnosis  of diabetes mellitus on the basis of elevated Hemoglobin A1c  should be confirmed by repeating the Hb A1c test.   Est Avg Glucose  mg/dL 114 126      CM   Comment: The eAG calculation is based           Status: Final result       Visible to patient: Yes (not seen)       Next appt: 03/12/2024 at 07:40 AM [facility] ([facility] M[facility] Demucha, A[facility]    0 Result Notes      Component  Ref Range & Units 3 wk ago   Syphilis, Treponemal Qual  Non-Reactive Non-Reactive   Comment: Result of Non-reactive indicates no serologic evidence of  infection with Treponema pallidum.  (Incubating or early  primary syphilis cannot be excluded).   Resulting Agency [facility]              Specimen Collected: 01/02/24 11:08 AM Last Resulted: 01/02/24  8:47 PM             0 Result Notes          Component  Ref Range & Units 3 wk ago 11 yr ago 12 yr ago 13 yr ago 15 yr ago   TSH  0.380 - 5.330 uIU/mL 2.080 3.510 R, CM 4.210 R 3.280 R  2.53           NEUROIMAGING:     omponent  Ref Range & Units 3 wk ago  (1/2/24) 4 mo ago  (9/19/23) 6 mo ago  (7/19/23) 3 yr ago  (1/14/21) 4 yr ago  (1/9/20) 4 yr ago  (9/9/19) 5 yr ago  (6/5/18)   Cholesterol,Tot  100 - 199 mg/dL 158 168 174 108 119 116 112   Triglycerides  0 - 149 mg/dL 121 104 266 High  133 100 102 145   HDL  >=40 mg/dL 48 42 43 37 Abnormal  49 49 36 Abnormal    LDL  <100 mg/dL 86 105 High  78 44 50 47 47   Resulting Agency M M M M          Impression/Plans/Recommendations:    Mild to Moderate Stage of Dementia- strongly suspect Neurodegenerative condition and potentially Lewy Body Disease given the REM Sleep Behavioral symptoms and visual hallucinations present for atleast the last 4 months or so.    He has been confusing brothers by their names (Ric vs Jay) and this has been occurring for well over 4 months.    Man  frequently asks the same question over and over again which suggests significant memory impairment for over 1  year at this point.    Man may have a mixture of Dementia with Lewy Bodies with Alz Disease given his MOCA profile.    (Note parkinsonism evident and this does not exclude Lewy Body Dementia)    MOCA score today of 8 out of 30 today.    Functional Activity Questionnaire score today of 25 per son.    The Alzheimer's Questionnaire (AQ) of 22 today> consistent with Dementia.    Today, I reviewed the clinical criteria and reviewed several  scenarios of the differences being using the medical terms of normal brain aging (age associated memory impairment),  Mild Cognitive Impairment (MCI) and Dementia.    Dementia  is a syndrome but statistically and for the majority of patients  occurs due  to a more rapid aging of the brain tissue or potentially from injury to certain parts of one's brain ( often from such contributing factors as  the cumulative effects of alcohol, from one or more ischemic or hemmorhagic stroke(s), from neurodegeneration or long standing with/without  "suboptimally controlled Hypertension). It is for some of these potential factors as to why I recommend a brain imaging test (Head CT or Brain MRI) be done for the 1st time or in certain circumstances repeated for comparison purposes  as such imaging can suggest one or more factors as to the reason(s) for the person's cognitive/memory changes. In fact, a normal or \"age related\" finding on a brain imaging test in and of itself is useful clinical and objective information to have in the evaluation of a person who has either an acute, evolving or even chronic (months to years) long cognitive/memory complaint.    Additional factors or contributors to Brain Health issues can be summarized in several books/references which I discussed as well today.     Goals going forwards include:    A. Paying attention to one's risk factors and reducing their prevalence or potential impact on one's changing memory/thinking> an excellent example would be to stop smoking, reduce or eliminate alcohol use (depending on the amount and frequency of usage), maintain good blood pressure control by buying a digital arm blood pressure cuff such as an OMRON Series 3 or 5 and checking one's blood pressure atleast 3 days per week (in the am and early afternoon) that the numbers are under 140/90 and working as needed with the primary care doctor  to optimize blood pressure control).    B. Encouraging proper sleep hygiene which for most adults is 7 to 8 hours of uninterrupted sleep per night.      C. Encouraging some form of exercise preferable aerobic forms to be done (4 to 5 days per week- 30 minutes minimum per day)> 150 minutes per week as a goal. Example activities could include fast walking (up a slight incline), jogging, cycling (road or stationary), swimming,tennis. Essentially, even basic walking on a flat surface or a treadmill would be better than doing nothing.    D. Maintaining or forming new social contacts with family and friends  and a " positive attitude despite the concerns and/or ongoing issues with thinking and/or memory.    E. Eating well which means a diet low in salt  (under 2 grams per day), sugar and saturated fat.    F. Maintaining one's BMI (Body Mass Index) under 30.    G. Consideration of the use of cognitive enhancers (acetylcholinerase inhibitors such as Aricept vs an NMDA Receptor agent like Namenda). Pros and cons of such compounds in terms of predicted efficacy and side effects profiles reviewed    Aricept 5 mg in am x 1 month the 10 mg a day in am.  I gave son information about this medication and it's use and side effects. Agreement was to start it.    H. Vitamin B6 and B12 deficiency on supplementation presently per son.    I. Brain MRI and Brain PET Scan (the latter to determine the sub type of neurodegeneration).    J. Recheck Vitamin B6 and B12 planned> he is on replacement Rx and I suggested he increase to Sub Lingual B12 to 2000 IU a day from 1000 IU a day.    K. He is not driving at this time.    L.  Brain Health factors reviewed today.    M.  Dementia Self Management Guidebook given to son.    N. Melatonin 3 mg to 6 mg QPM recommended for REM Sleep Behavioral symptoms.        I have performed  a history and physical exam and a directed /focused  ROS today.    Total time spent today or this patient's care was 52  minutes  and included reviewing  the diagnostic workup to date (such as labs and imaging as well as interpreting such tests relevant to this patient's neurological condition),  reviewing/obtaining separately obtained history (from patient and/or accompanying family member)  for today's neurological problem(s) ,counseling and educating the patient and family member on issues related to cognition/memory and cognitive health factors and documenting  the clinical information in the EMR.    Follow up: in 6 months or so        Raffaele Noriega MD  [facility].    [facility]

## 2024-01-26 PROBLEM — R41.3 DECLINE IN VERBAL MEMORY: Status: ACTIVE | Noted: 2024-01-26

## 2024-02-09 ENCOUNTER — HOSPITAL ENCOUNTER (OUTPATIENT)
Dept: RADIOLOGY | Facility: MEDICAL CENTER | Age: 79
End: 2024-02-09
Attending: PSYCHIATRY & NEUROLOGY
Payer: MEDICARE

## 2024-02-09 DIAGNOSIS — F22 PARANOIA (HCC): ICD-10-CM

## 2024-02-09 DIAGNOSIS — R41.3 DECLINE IN VERBAL MEMORY: ICD-10-CM

## 2024-02-09 DIAGNOSIS — G47.52 REM SLEEP BEHAVIOR DISORDER: ICD-10-CM

## 2024-02-09 DIAGNOSIS — F03.A3 MILD DEMENTIA WITH MOOD DISTURBANCE, UNSPECIFIED DEMENTIA TYPE (HCC): ICD-10-CM

## 2024-02-09 DIAGNOSIS — F03.A11: ICD-10-CM

## 2024-02-09 PROCEDURE — A9552 F18 FDG: HCPCS

## 2024-02-20 ENCOUNTER — HOSPITAL ENCOUNTER (OUTPATIENT)
Dept: RADIOLOGY | Facility: MEDICAL CENTER | Age: 79
End: 2024-02-20
Attending: PSYCHIATRY & NEUROLOGY
Payer: MEDICARE

## 2024-02-20 DIAGNOSIS — F03.A3 MILD DEMENTIA WITH MOOD DISTURBANCE, UNSPECIFIED DEMENTIA TYPE (HCC): ICD-10-CM

## 2024-02-20 DIAGNOSIS — F03.A11: ICD-10-CM

## 2024-02-20 DIAGNOSIS — F22 PARANOIA (HCC): ICD-10-CM

## 2024-02-20 PROCEDURE — 70551 MRI BRAIN STEM W/O DYE: CPT

## 2024-02-27 ENCOUNTER — HOSPITAL ENCOUNTER (OUTPATIENT)
Dept: LAB | Facility: MEDICAL CENTER | Age: 79
End: 2024-02-27
Attending: FAMILY MEDICINE
Payer: MEDICARE

## 2024-02-27 ENCOUNTER — HOSPITAL ENCOUNTER (OUTPATIENT)
Dept: LAB | Facility: MEDICAL CENTER | Age: 79
End: 2024-02-27
Attending: PSYCHIATRY & NEUROLOGY
Payer: MEDICARE

## 2024-02-27 DIAGNOSIS — F03.A3 MILD DEMENTIA WITH MOOD DISTURBANCE, UNSPECIFIED DEMENTIA TYPE (HCC): ICD-10-CM

## 2024-02-27 LAB
FOLATE SERPL-MCNC: 5.8 NG/ML
VIT B12 SERPL-MCNC: 459 PG/ML (ref 211–911)

## 2024-02-27 PROCEDURE — 82607 VITAMIN B-12: CPT

## 2024-02-27 PROCEDURE — 82746 ASSAY OF FOLIC ACID SERUM: CPT

## 2024-02-27 PROCEDURE — 36415 COLL VENOUS BLD VENIPUNCTURE: CPT

## 2024-02-27 PROCEDURE — 84207 ASSAY OF VITAMIN B-6: CPT

## 2024-02-27 PROCEDURE — 84425 ASSAY OF VITAMIN B-1: CPT

## 2024-02-27 PROCEDURE — 86340 INTRINSIC FACTOR ANTIBODY: CPT

## 2024-02-27 PROCEDURE — 83921 ORGANIC ACID SINGLE QUANT: CPT

## 2024-02-29 LAB — IF BLOCK AB SER QL RIA: NEGATIVE

## 2024-03-01 ENCOUNTER — DOCUMENTATION (OUTPATIENT)
Dept: NEUROLOGY | Facility: MEDICAL CENTER | Age: 79
End: 2024-03-01
Payer: MEDICARE

## 2024-03-01 DIAGNOSIS — E67.8 EXCESSIVE VITAMIN B6 INTAKE: ICD-10-CM

## 2024-03-01 LAB — VIT B6 SERPL-MCNC: 173.9 NMOL/L (ref 20–125)

## 2024-03-02 LAB
METHYLMALONATE SERPL-SCNC: 0.1 UMOL/L (ref 0–0.4)
VIT B1 BLD-MCNC: 100 NMOL/L (ref 70–180)

## 2024-03-31 ENCOUNTER — APPOINTMENT (OUTPATIENT)
Dept: RADIOLOGY | Facility: MEDICAL CENTER | Age: 79
End: 2024-03-31
Attending: EMERGENCY MEDICINE
Payer: MEDICARE

## 2024-03-31 ENCOUNTER — HOSPITAL ENCOUNTER (EMERGENCY)
Facility: MEDICAL CENTER | Age: 79
End: 2024-03-31
Attending: EMERGENCY MEDICINE
Payer: MEDICARE

## 2024-03-31 VITALS
DIASTOLIC BLOOD PRESSURE: 61 MMHG | BODY MASS INDEX: 25.85 KG/M2 | HEART RATE: 75 BPM | OXYGEN SATURATION: 96 % | RESPIRATION RATE: 18 BRPM | SYSTOLIC BLOOD PRESSURE: 110 MMHG | WEIGHT: 164.68 LBS | TEMPERATURE: 97.3 F | HEIGHT: 67 IN

## 2024-03-31 DIAGNOSIS — S00.81XA ABRASION OF FACE, INITIAL ENCOUNTER: ICD-10-CM

## 2024-03-31 DIAGNOSIS — S09.90XA CLOSED HEAD INJURY, INITIAL ENCOUNTER: ICD-10-CM

## 2024-03-31 PROCEDURE — 72125 CT NECK SPINE W/O DYE: CPT

## 2024-03-31 PROCEDURE — 70450 CT HEAD/BRAIN W/O DYE: CPT

## 2024-03-31 PROCEDURE — 99284 EMERGENCY DEPT VISIT MOD MDM: CPT

## 2024-03-31 PROCEDURE — 700111 HCHG RX REV CODE 636 W/ 250 OVERRIDE (IP): Performed by: EMERGENCY MEDICINE

## 2024-03-31 PROCEDURE — 304217 HCHG IRRIGATION SYSTEM

## 2024-03-31 PROCEDURE — 90715 TDAP VACCINE 7 YRS/> IM: CPT | Performed by: EMERGENCY MEDICINE

## 2024-03-31 PROCEDURE — 90471 IMMUNIZATION ADMIN: CPT

## 2024-03-31 RX ADMIN — CLOSTRIDIUM TETANI TOXOID ANTIGEN (FORMALDEHYDE INACTIVATED), CORYNEBACTERIUM DIPHTHERIAE TOXOID ANTIGEN (FORMALDEHYDE INACTIVATED), BORDETELLA PERTUSSIS TOXOID ANTIGEN (GLUTARALDEHYDE INACTIVATED), BORDETELLA PERTUSSIS FILAMENTOUS HEMAGGLUTININ ANTIGEN (FORMALDEHYDE INACTIVATED), BORDETELLA PERTUSSIS PERTACTIN ANTIGEN, AND BORDETELLA PERTUSSIS FIMBRIAE 2/3 ANTIGEN 0.5 ML: 5; 2; 2.5; 5; 3; 5 INJECTION, SUSPENSION INTRAMUSCULAR at 08:55

## 2024-03-31 ASSESSMENT — FIBROSIS 4 INDEX: FIB4 SCORE: 0.93

## 2024-03-31 ASSESSMENT — PAIN DESCRIPTION - PAIN TYPE: TYPE: ACUTE PAIN

## 2024-03-31 NOTE — ED NOTES
DC home with written and verbal instructions regarding f/u, activity and RX.  Verbalized understanding, assisted via wheelchair to front lobby and assisted into vehicle with pt's son for transport back home.

## 2024-03-31 NOTE — ED NOTES
Pt given tetanus vaccine and clean clothes from the donation bin. Pts son to arrive for transport back home.
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
## 2024-12-12 ENCOUNTER — HOSPITAL ENCOUNTER (INPATIENT)
Facility: MEDICAL CENTER | Age: 79
LOS: 3 days | DRG: 922 | End: 2024-12-15
Attending: STUDENT IN AN ORGANIZED HEALTH CARE EDUCATION/TRAINING PROGRAM | Admitting: EMERGENCY MEDICINE
Payer: MEDICARE

## 2024-12-12 ENCOUNTER — APPOINTMENT (OUTPATIENT)
Dept: RADIOLOGY | Facility: MEDICAL CENTER | Age: 79
DRG: 922 | End: 2024-12-12
Attending: STUDENT IN AN ORGANIZED HEALTH CARE EDUCATION/TRAINING PROGRAM
Payer: MEDICARE

## 2024-12-12 ENCOUNTER — APPOINTMENT (OUTPATIENT)
Dept: CARDIOLOGY | Facility: MEDICAL CENTER | Age: 79
DRG: 922 | End: 2024-12-12
Payer: MEDICARE

## 2024-12-12 DIAGNOSIS — T68.XXXA HYPOTHERMIA, INITIAL ENCOUNTER: ICD-10-CM

## 2024-12-12 DIAGNOSIS — F03.90 DEMENTIA, UNSPECIFIED DEMENTIA SEVERITY, UNSPECIFIED DEMENTIA TYPE, UNSPECIFIED WHETHER BEHAVIORAL, PSYCHOTIC, OR MOOD DISTURBANCE OR ANXIETY (HCC): ICD-10-CM

## 2024-12-12 DIAGNOSIS — I48.91 ATRIAL FIBRILLATION WITH RVR (HCC): ICD-10-CM

## 2024-12-12 DIAGNOSIS — R41.0 DELIRIUM: ICD-10-CM

## 2024-12-12 DIAGNOSIS — R41.82 ALTERED MENTAL STATUS, UNSPECIFIED ALTERED MENTAL STATUS TYPE: ICD-10-CM

## 2024-12-12 DIAGNOSIS — F03.911 DEMENTIA WITH AGITATION, UNSPECIFIED DEMENTIA SEVERITY, UNSPECIFIED DEMENTIA TYPE (HCC): ICD-10-CM

## 2024-12-12 PROBLEM — I48.0 PAROXYSMAL ATRIAL FIBRILLATION (HCC): Status: ACTIVE | Noted: 2024-12-12

## 2024-12-12 PROBLEM — E87.29 HIGH ANION GAP METABOLIC ACIDOSIS: Status: ACTIVE | Noted: 2024-12-12

## 2024-12-12 PROBLEM — R00.0 TACHYARRHYTHMIA: Status: ACTIVE | Noted: 2024-12-12

## 2024-12-12 PROBLEM — I95.9 HYPOTENSION: Status: ACTIVE | Noted: 2024-12-12

## 2024-12-12 PROBLEM — I48.0 PAROXYSMAL ATRIAL FIBRILLATION (HCC): Status: RESOLVED | Noted: 2024-12-12 | Resolved: 2024-12-12

## 2024-12-12 PROBLEM — M62.82 RHABDOMYOLYSIS: Status: ACTIVE | Noted: 2024-12-12

## 2024-12-12 LAB
ALBUMIN SERPL BCP-MCNC: 3.5 G/DL (ref 3.2–4.9)
ALBUMIN/GLOB SERPL: 1.1 G/DL
ALP SERPL-CCNC: 80 U/L (ref 30–99)
ALT SERPL-CCNC: 32 U/L (ref 2–50)
AMORPHOUS CRYSTALS 1764: PRESENT /HPF
AMPHET UR QL SCN: NEGATIVE
ANION GAP SERPL CALC-SCNC: 15 MMOL/L (ref 7–16)
ANION GAP SERPL CALC-SCNC: 18 MMOL/L (ref 7–16)
ANISOCYTOSIS BLD QL SMEAR: ABNORMAL
APPEARANCE UR: CLEAR
APTT PPP: 25.8 SEC (ref 24.7–36)
AST SERPL-CCNC: 51 U/L (ref 12–45)
BACTERIA #/AREA URNS HPF: ABNORMAL /HPF
BARBITURATES UR QL SCN: NEGATIVE
BASOPHILS # BLD AUTO: 0 % (ref 0–1.8)
BASOPHILS # BLD: 0 K/UL (ref 0–0.12)
BENZODIAZ UR QL SCN: NEGATIVE
BILIRUB SERPL-MCNC: 0.4 MG/DL (ref 0.1–1.5)
BILIRUB UR QL STRIP.AUTO: NEGATIVE
BUN SERPL-MCNC: 28 MG/DL (ref 8–22)
BUN SERPL-MCNC: 31 MG/DL (ref 8–22)
BURR CELLS BLD QL SMEAR: NORMAL
BZE UR QL SCN: NEGATIVE
CALCIUM ALBUM COR SERPL-MCNC: 10 MG/DL (ref 8.5–10.5)
CALCIUM SERPL-MCNC: 8.4 MG/DL (ref 8.5–10.5)
CALCIUM SERPL-MCNC: 9.6 MG/DL (ref 8.5–10.5)
CANNABINOIDS UR QL SCN: NEGATIVE
CASTS URNS QL MICRO: ABNORMAL /LPF (ref 0–2)
CHLORIDE SERPL-SCNC: 106 MMOL/L (ref 96–112)
CHLORIDE SERPL-SCNC: 111 MMOL/L (ref 96–112)
CK SERPL-CCNC: 1331 U/L (ref 0–154)
CK SERPL-CCNC: 1903 U/L (ref 0–154)
CO2 SERPL-SCNC: 16 MMOL/L (ref 20–33)
CO2 SERPL-SCNC: 18 MMOL/L (ref 20–33)
COLOR UR: YELLOW
CORTIS SERPL-MCNC: 44.2 UG/DL (ref 0–23)
CREAT SERPL-MCNC: 1.22 MG/DL (ref 0.5–1.4)
CREAT SERPL-MCNC: 1.32 MG/DL (ref 0.5–1.4)
EKG IMPRESSION: NORMAL
EOSINOPHIL # BLD AUTO: 0 K/UL (ref 0–0.51)
EOSINOPHIL NFR BLD: 0 % (ref 0–6.9)
EPITHELIAL CELLS 1715: ABNORMAL /HPF (ref 0–5)
ERYTHROCYTE [DISTWIDTH] IN BLOOD BY AUTOMATED COUNT: 41.9 FL (ref 35.9–50)
FENTANYL UR QL: POSITIVE
FLUAV RNA SPEC QL NAA+PROBE: NEGATIVE
FLUBV RNA SPEC QL NAA+PROBE: NEGATIVE
GFR SERPLBLD CREATININE-BSD FMLA CKD-EPI: 56 ML/MIN/1.73 M 2
GFR SERPLBLD CREATININE-BSD FMLA CKD-EPI: 61 ML/MIN/1.73 M 2
GLOBULIN SER CALC-MCNC: 3.3 G/DL (ref 1.9–3.5)
GLUCOSE BLD STRIP.AUTO-MCNC: 81 MG/DL (ref 65–99)
GLUCOSE BLD STRIP.AUTO-MCNC: 94 MG/DL (ref 65–99)
GLUCOSE SERPL-MCNC: 78 MG/DL (ref 65–99)
GLUCOSE SERPL-MCNC: 98 MG/DL (ref 65–99)
GLUCOSE UR STRIP.AUTO-MCNC: NEGATIVE MG/DL
HCT VFR BLD AUTO: 32.8 % (ref 42–52)
HGB BLD-MCNC: 10.6 G/DL (ref 14–18)
HYALINE CAST   1831: PRESENT /LPF
INR PPP: 1.11 (ref 0.87–1.13)
KETONES UR STRIP.AUTO-MCNC: ABNORMAL MG/DL
LACTATE SERPL-SCNC: 0.9 MMOL/L (ref 0.5–2)
LACTATE SERPL-SCNC: 1.8 MMOL/L (ref 0.5–2)
LACTATE SERPL-SCNC: 4.9 MMOL/L (ref 0.5–2)
LEUKOCYTE ESTERASE UR QL STRIP.AUTO: NEGATIVE
LV EJECT FRACT  99904: 70
LV EJECT FRACT MOD 2C 99903: 59.26
LV EJECT FRACT MOD 4C 99902: 77.27
LV EJECT FRACT MOD BP 99901: 69.48
LYMPHOCYTES # BLD AUTO: 1.44 K/UL (ref 1–4.8)
LYMPHOCYTES NFR BLD: 6.9 % (ref 22–41)
MAGNESIUM SERPL-MCNC: 2.7 MG/DL (ref 1.5–2.5)
MANUAL DIFF BLD: NORMAL
MCH RBC QN AUTO: 26.7 PG (ref 27–33)
MCHC RBC AUTO-ENTMCNC: 32.3 G/DL (ref 32.3–36.5)
MCV RBC AUTO: 82.6 FL (ref 81.4–97.8)
METAMYELOCYTES NFR BLD MANUAL: 2.6 %
METHADONE UR QL SCN: NEGATIVE
MICRO URNS: ABNORMAL
MICROCYTES BLD QL SMEAR: ABNORMAL
MONOCYTES # BLD AUTO: 0.17 K/UL (ref 0–0.85)
MONOCYTES NFR BLD AUTO: 0.8 % (ref 0–13.4)
MORPHOLOGY BLD-IMP: NORMAL
NEUTROPHILS # BLD AUTO: 18.66 K/UL (ref 1.82–7.42)
NEUTROPHILS NFR BLD: 88.8 % (ref 44–72)
NEUTS BAND NFR BLD MANUAL: 0.9 % (ref 0–10)
NITRITE UR QL STRIP.AUTO: NEGATIVE
NRBC # BLD AUTO: 0 K/UL
NRBC BLD-RTO: 0 /100 WBC (ref 0–0.2)
OPIATES UR QL SCN: NEGATIVE
OXYCODONE UR QL SCN: NEGATIVE
PCP UR QL SCN: NEGATIVE
PH UR STRIP.AUTO: 6.5 [PH] (ref 5–8)
PLATELET # BLD AUTO: 603 K/UL (ref 164–446)
PLATELET BLD QL SMEAR: NORMAL
PMV BLD AUTO: 8.6 FL (ref 9–12.9)
POIKILOCYTOSIS BLD QL SMEAR: NORMAL
POTASSIUM SERPL-SCNC: 3.7 MMOL/L (ref 3.6–5.5)
POTASSIUM SERPL-SCNC: 3.9 MMOL/L (ref 3.6–5.5)
PROPOXYPH UR QL SCN: NEGATIVE
PROT SERPL-MCNC: 6.8 G/DL (ref 6–8.2)
PROT UR QL STRIP: 30 MG/DL
PROTHROMBIN TIME: 14.3 SEC (ref 12–14.6)
RBC # BLD AUTO: 3.97 M/UL (ref 4.7–6.1)
RBC # URNS HPF: ABNORMAL /HPF (ref 0–2)
RBC BLD AUTO: PRESENT
RBC UR QL AUTO: ABNORMAL
RSV RNA SPEC QL NAA+PROBE: NEGATIVE
SARS-COV-2 RNA RESP QL NAA+PROBE: NOTDETECTED
SCCMEC + MECA PNL NOSE NAA+PROBE: NEGATIVE
SODIUM SERPL-SCNC: 142 MMOL/L (ref 135–145)
SODIUM SERPL-SCNC: 142 MMOL/L (ref 135–145)
SP GR UR STRIP.AUTO: 1.01
SPECIMEN SOURCE: NORMAL
T4 FREE SERPL-MCNC: 1.51 NG/DL (ref 0.93–1.7)
TRANS CELLS URNS QL MICRO: PRESENT /HPF
TROPONIN T SERPL-MCNC: 58 NG/L (ref 6–19)
TROPONIN T SERPL-MCNC: 75 NG/L (ref 6–19)
TROPONIN T SERPL-MCNC: 80 NG/L (ref 6–19)
TSH SERPL DL<=0.005 MIU/L-ACNC: 4.73 UIU/ML (ref 0.38–5.33)
UROBILINOGEN UR STRIP.AUTO-MCNC: 1 EU/DL
WBC # BLD AUTO: 20.8 K/UL (ref 4.8–10.8)
WBC #/AREA URNS HPF: ABNORMAL /HPF

## 2024-12-12 PROCEDURE — 85007 BL SMEAR W/DIFF WBC COUNT: CPT

## 2024-12-12 PROCEDURE — 87086 URINE CULTURE/COLONY COUNT: CPT

## 2024-12-12 PROCEDURE — 93306 TTE W/DOPPLER COMPLETE: CPT | Mod: 26 | Performed by: INTERNAL MEDICINE

## 2024-12-12 PROCEDURE — 0241U HCHG SARS-COV-2 COVID-19 NFCT DS RESP RNA 4 TRGT MIC: CPT

## 2024-12-12 PROCEDURE — 93306 TTE W/DOPPLER COMPLETE: CPT

## 2024-12-12 PROCEDURE — 93010 ELECTROCARDIOGRAM REPORT: CPT | Mod: 59,76 | Performed by: INTERNAL MEDICINE

## 2024-12-12 PROCEDURE — 93005 ELECTROCARDIOGRAM TRACING: CPT | Mod: TC | Performed by: INTERNAL MEDICINE

## 2024-12-12 PROCEDURE — 71045 X-RAY EXAM CHEST 1 VIEW: CPT

## 2024-12-12 PROCEDURE — 306565 RIGID MIT RESTRAINT(PAIR): Performed by: STUDENT IN AN ORGANIZED HEALTH CARE EDUCATION/TRAINING PROGRAM

## 2024-12-12 PROCEDURE — 700111 HCHG RX REV CODE 636 W/ 250 OVERRIDE (IP): Mod: JZ

## 2024-12-12 PROCEDURE — 700101 HCHG RX REV CODE 250

## 2024-12-12 PROCEDURE — 81001 URINALYSIS AUTO W/SCOPE: CPT

## 2024-12-12 PROCEDURE — 87641 MR-STAPH DNA AMP PROBE: CPT

## 2024-12-12 PROCEDURE — 93005 ELECTROCARDIOGRAM TRACING: CPT | Mod: TC

## 2024-12-12 PROCEDURE — 700111 HCHG RX REV CODE 636 W/ 250 OVERRIDE (IP): Performed by: STUDENT IN AN ORGANIZED HEALTH CARE EDUCATION/TRAINING PROGRAM

## 2024-12-12 PROCEDURE — 93005 ELECTROCARDIOGRAM TRACING: CPT | Mod: TC | Performed by: STUDENT IN AN ORGANIZED HEALTH CARE EDUCATION/TRAINING PROGRAM

## 2024-12-12 PROCEDURE — 99291 CRITICAL CARE FIRST HOUR: CPT | Performed by: EMERGENCY MEDICINE

## 2024-12-12 PROCEDURE — 80053 COMPREHEN METABOLIC PANEL: CPT

## 2024-12-12 PROCEDURE — 700105 HCHG RX REV CODE 258: Performed by: INTERNAL MEDICINE

## 2024-12-12 PROCEDURE — 99223 1ST HOSP IP/OBS HIGH 75: CPT | Performed by: INTERNAL MEDICINE

## 2024-12-12 PROCEDURE — 36415 COLL VENOUS BLD VENIPUNCTURE: CPT

## 2024-12-12 PROCEDURE — 93010 ELECTROCARDIOGRAM REPORT: CPT | Mod: 59 | Performed by: INTERNAL MEDICINE

## 2024-12-12 PROCEDURE — 770022 HCHG ROOM/CARE - ICU (200)

## 2024-12-12 PROCEDURE — 84443 ASSAY THYROID STIM HORMONE: CPT

## 2024-12-12 PROCEDURE — 99291 CRITICAL CARE FIRST HOUR: CPT

## 2024-12-12 PROCEDURE — 87040 BLOOD CULTURE FOR BACTERIA: CPT | Mod: 91

## 2024-12-12 PROCEDURE — 82550 ASSAY OF CK (CPK): CPT

## 2024-12-12 PROCEDURE — 84484 ASSAY OF TROPONIN QUANT: CPT | Mod: 91

## 2024-12-12 PROCEDURE — 85610 PROTHROMBIN TIME: CPT

## 2024-12-12 PROCEDURE — 700111 HCHG RX REV CODE 636 W/ 250 OVERRIDE (IP): Performed by: INTERNAL MEDICINE

## 2024-12-12 PROCEDURE — C1751 CATH, INF, PER/CENT/MIDLINE: HCPCS

## 2024-12-12 PROCEDURE — 700105 HCHG RX REV CODE 258: Performed by: STUDENT IN AN ORGANIZED HEALTH CARE EDUCATION/TRAINING PROGRAM

## 2024-12-12 PROCEDURE — 93010 ELECTROCARDIOGRAM REPORT: CPT | Mod: 77,59 | Performed by: INTERNAL MEDICINE

## 2024-12-12 PROCEDURE — 70450 CT HEAD/BRAIN W/O DYE: CPT

## 2024-12-12 PROCEDURE — 84439 ASSAY OF FREE THYROXINE: CPT

## 2024-12-12 PROCEDURE — 72125 CT NECK SPINE W/O DYE: CPT

## 2024-12-12 PROCEDURE — 700105 HCHG RX REV CODE 258

## 2024-12-12 PROCEDURE — 85027 COMPLETE CBC AUTOMATED: CPT

## 2024-12-12 PROCEDURE — 700111 HCHG RX REV CODE 636 W/ 250 OVERRIDE (IP): Performed by: EMERGENCY MEDICINE

## 2024-12-12 PROCEDURE — 83735 ASSAY OF MAGNESIUM: CPT

## 2024-12-12 PROCEDURE — 96374 THER/PROPH/DIAG INJ IV PUSH: CPT

## 2024-12-12 PROCEDURE — 80307 DRUG TEST PRSMV CHEM ANLYZR: CPT

## 2024-12-12 PROCEDURE — 80048 BASIC METABOLIC PNL TOTAL CA: CPT

## 2024-12-12 PROCEDURE — 83605 ASSAY OF LACTIC ACID: CPT | Mod: 91

## 2024-12-12 PROCEDURE — 36556 INSERT NON-TUNNEL CV CATH: CPT

## 2024-12-12 PROCEDURE — 700105 HCHG RX REV CODE 258: Performed by: EMERGENCY MEDICINE

## 2024-12-12 PROCEDURE — 85730 THROMBOPLASTIN TIME PARTIAL: CPT

## 2024-12-12 PROCEDURE — 82533 TOTAL CORTISOL: CPT

## 2024-12-12 PROCEDURE — 97602 WOUND(S) CARE NON-SELECTIVE: CPT

## 2024-12-12 PROCEDURE — 82962 GLUCOSE BLOOD TEST: CPT | Mod: 91

## 2024-12-12 RX ORDER — SODIUM CHLORIDE, SODIUM LACTATE, POTASSIUM CHLORIDE, AND CALCIUM CHLORIDE .6; .31; .03; .02 G/100ML; G/100ML; G/100ML; G/100ML
1000 INJECTION, SOLUTION INTRAVENOUS ONCE
Status: COMPLETED | OUTPATIENT
Start: 2024-12-12 | End: 2024-12-12

## 2024-12-12 RX ORDER — SODIUM CHLORIDE, SODIUM LACTATE, POTASSIUM CHLORIDE, AND CALCIUM CHLORIDE .6; .31; .03; .02 G/100ML; G/100ML; G/100ML; G/100ML
500 INJECTION, SOLUTION INTRAVENOUS ONCE
Status: COMPLETED | OUTPATIENT
Start: 2024-12-12 | End: 2024-12-12

## 2024-12-12 RX ORDER — DEXMEDETOMIDINE HYDROCHLORIDE 4 UG/ML
.1-1.5 INJECTION, SOLUTION INTRAVENOUS CONTINUOUS
Status: DISCONTINUED | OUTPATIENT
Start: 2024-12-12 | End: 2024-12-14

## 2024-12-12 RX ORDER — HALOPERIDOL 5 MG/ML
5 INJECTION INTRAMUSCULAR ONCE
Status: COMPLETED | OUTPATIENT
Start: 2024-12-12 | End: 2024-12-12

## 2024-12-12 RX ORDER — DEXTROSE MONOHYDRATE 25 G/50ML
25 INJECTION, SOLUTION INTRAVENOUS
Status: DISCONTINUED | OUTPATIENT
Start: 2024-12-12 | End: 2024-12-15 | Stop reason: HOSPADM

## 2024-12-12 RX ORDER — THIAMINE HYDROCHLORIDE 100 MG/ML
200 INJECTION, SOLUTION INTRAMUSCULAR; INTRAVENOUS 2 TIMES DAILY
Status: DISCONTINUED | OUTPATIENT
Start: 2024-12-12 | End: 2024-12-14

## 2024-12-12 RX ORDER — SODIUM CHLORIDE 9 MG/ML
1000 INJECTION, SOLUTION INTRAVENOUS ONCE
Status: COMPLETED | OUTPATIENT
Start: 2024-12-12 | End: 2024-12-12

## 2024-12-12 RX ORDER — VIT A/VIT C/VIT E/ZINC/COPPER 7160-113
1 TABLET, DELAYED RELEASE (ENTERIC COATED) ORAL EVERY MORNING
COMMUNITY

## 2024-12-12 RX ORDER — NOREPINEPHRINE BITARTRATE 0.03 MG/ML
0-1 INJECTION, SOLUTION INTRAVENOUS CONTINUOUS
Status: DISCONTINUED | OUTPATIENT
Start: 2024-12-12 | End: 2024-12-14

## 2024-12-12 RX ORDER — DEXTROSE MONOHYDRATE 50 MG/ML
INJECTION, SOLUTION INTRAVENOUS CONTINUOUS
Status: DISCONTINUED | OUTPATIENT
Start: 2024-12-12 | End: 2024-12-12

## 2024-12-12 RX ORDER — CEFTRIAXONE 1 G/1
1000 INJECTION, POWDER, FOR SOLUTION INTRAMUSCULAR; INTRAVENOUS ONCE
Status: DISCONTINUED | OUTPATIENT
Start: 2024-12-12 | End: 2024-12-12

## 2024-12-12 RX ORDER — NOREPINEPHRINE BITARTRATE 0.03 MG/ML
0-1 INJECTION, SOLUTION INTRAVENOUS CONTINUOUS
Status: DISCONTINUED | OUTPATIENT
Start: 2024-12-12 | End: 2024-12-12

## 2024-12-12 RX ORDER — POLYETHYLENE GLYCOL 3350 17 G/17G
1 POWDER, FOR SOLUTION ORAL
Status: DISCONTINUED | OUTPATIENT
Start: 2024-12-12 | End: 2024-12-15 | Stop reason: HOSPADM

## 2024-12-12 RX ORDER — HALOPERIDOL 5 MG/ML
2 INJECTION INTRAMUSCULAR EVERY 6 HOURS PRN
Status: DISCONTINUED | OUTPATIENT
Start: 2024-12-12 | End: 2024-12-12

## 2024-12-12 RX ORDER — AMOXICILLIN 250 MG
2 CAPSULE ORAL EVERY EVENING
Status: DISCONTINUED | OUTPATIENT
Start: 2024-12-12 | End: 2024-12-15 | Stop reason: HOSPADM

## 2024-12-12 RX ORDER — DEXTROSE MONOHYDRATE 100 MG/ML
INJECTION, SOLUTION INTRAVENOUS CONTINUOUS
Status: ACTIVE | OUTPATIENT
Start: 2024-12-12 | End: 2024-12-13

## 2024-12-12 RX ORDER — AMIODARONE HYDROCHLORIDE 200 MG/1
200 TABLET ORAL DAILY
Status: DISCONTINUED | OUTPATIENT
Start: 2024-12-12 | End: 2024-12-14

## 2024-12-12 RX ORDER — POTASSIUM CHLORIDE 7.45 MG/ML
10 INJECTION INTRAVENOUS
Status: COMPLETED | OUTPATIENT
Start: 2024-12-12 | End: 2024-12-12

## 2024-12-12 RX ADMIN — HALOPERIDOL LACTATE 2 MG: 5 INJECTION, SOLUTION INTRAMUSCULAR at 21:35

## 2024-12-12 RX ADMIN — DEXTROSE MONOHYDRATE: 100 INJECTION, SOLUTION INTRAVENOUS at 18:19

## 2024-12-12 RX ADMIN — THIAMINE HYDROCHLORIDE 200 MG: 100 INJECTION, SOLUTION INTRAMUSCULAR; INTRAVENOUS at 18:15

## 2024-12-12 RX ADMIN — FENTANYL CITRATE 25 MCG: 50 INJECTION, SOLUTION INTRAMUSCULAR; INTRAVENOUS at 08:29

## 2024-12-12 RX ADMIN — VANCOMYCIN HYDROCHLORIDE 1250 MG: 5 INJECTION, POWDER, LYOPHILIZED, FOR SOLUTION INTRAVENOUS at 09:03

## 2024-12-12 RX ADMIN — HALOPERIDOL LACTATE 5 MG: 5 INJECTION, SOLUTION INTRAMUSCULAR at 22:02

## 2024-12-12 RX ADMIN — FENTANYL CITRATE 25 MCG: 50 INJECTION, SOLUTION INTRAMUSCULAR; INTRAVENOUS at 22:41

## 2024-12-12 RX ADMIN — DEXTROSE MONOHYDRATE: 50 INJECTION, SOLUTION INTRAVENOUS at 05:30

## 2024-12-12 RX ADMIN — SODIUM CHLORIDE, POTASSIUM CHLORIDE, SODIUM LACTATE AND CALCIUM CHLORIDE 1000 ML: 600; 310; 30; 20 INJECTION, SOLUTION INTRAVENOUS at 06:18

## 2024-12-12 RX ADMIN — AMIODARONE HYDROCHLORIDE 150 MG: 1.5 INJECTION, SOLUTION INTRAVENOUS at 05:31

## 2024-12-12 RX ADMIN — THIAMINE HYDROCHLORIDE 200 MG: 100 INJECTION, SOLUTION INTRAMUSCULAR; INTRAVENOUS at 09:09

## 2024-12-12 RX ADMIN — HALOPERIDOL LACTATE 2 MG: 5 INJECTION, SOLUTION INTRAMUSCULAR at 15:22

## 2024-12-12 RX ADMIN — HALOPERIDOL LACTATE 5 MG: 5 INJECTION, SOLUTION INTRAMUSCULAR at 09:08

## 2024-12-12 RX ADMIN — POTASSIUM CHLORIDE 10 MEQ: 10 INJECTION, SOLUTION INTRAVENOUS at 18:23

## 2024-12-12 RX ADMIN — SODIUM CHLORIDE 1000 ML: 9 INJECTION, SOLUTION INTRAVENOUS at 04:23

## 2024-12-12 RX ADMIN — DEXMEDETOMIDINE HYDROCHLORIDE 0.2 MCG/KG/HR: 4 INJECTION, SOLUTION INTRAVENOUS at 15:54

## 2024-12-12 RX ADMIN — CEFTRIAXONE SODIUM 1000 MG: 10 INJECTION, POWDER, FOR SOLUTION INTRAVENOUS at 09:51

## 2024-12-12 RX ADMIN — SODIUM CHLORIDE, SODIUM LACTATE, POTASSIUM CHLORIDE, AND CALCIUM CHLORIDE 500 ML: .6; .31; .03; .02 INJECTION, SOLUTION INTRAVENOUS at 14:27

## 2024-12-12 RX ADMIN — SODIUM CHLORIDE 1000 ML: 9 INJECTION, SOLUTION INTRAVENOUS at 05:03

## 2024-12-12 RX ADMIN — POTASSIUM CHLORIDE 10 MEQ: 10 INJECTION, SOLUTION INTRAVENOUS at 19:20

## 2024-12-12 RX ADMIN — SODIUM CHLORIDE, SODIUM LACTATE, POTASSIUM CHLORIDE, AND CALCIUM CHLORIDE 1000 ML: .6; .31; .03; .02 INJECTION, SOLUTION INTRAVENOUS at 10:31

## 2024-12-12 RX ADMIN — NOREPINEPHRINE BITARTRATE 0.1 MCG/KG/MIN: 0.03 INJECTION, SOLUTION INTRAVENOUS at 10:11

## 2024-12-12 ASSESSMENT — PAIN DESCRIPTION - PAIN TYPE
TYPE: ACUTE PAIN

## 2024-12-12 ASSESSMENT — FIBROSIS 4 INDEX: FIB4 SCORE: 0.94

## 2024-12-12 NOTE — ASSESSMENT & PLAN NOTE
Recurrent in ED, volume responsive  POCUS suggests low RAP high SV state, distributive versus hypovolemic  Likely quite hypovolemic/distributive given hypothermia and subsequent vasoplegia associated with rewarming  Continue to volume tolerant/responsive    Unclear if this is sepsis but will volume resus and give empiric abx  EKG non diagnostic  Trop flat  CXR without focal infiltrate  UA with bacteruria, no pyuria    Lactate normalized  Seems volume tolerant will continue to bolus (30/kg given) crystalloid for now  Norepi to maintain MAP > 65  Thiamine  Empiric Vanc and CTX, given, deescalate to CTX, follow cultures and urine
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
## 2024-12-12 NOTE — ASSESSMENT & PLAN NOTE
Hx of CAD on prior MPI, not on any meds. Trop mildly elevated, likely demand ischemia. Had transient ST depressions in the anterior leads that were resolved on repeat EKG.   Will trend trop, repeat EKG if needed

## 2024-12-12 NOTE — ED TRIAGE NOTES
"Jamie Pacheco  79 y.o. male    Chief Complaint   Patient presents with    Head Injury    ALOC     Pt arrives via EMS from home after striking head on bathtub. Per EMS, family attempted to bathe patient after soiling himself and upon putting pt in bathtub, pt became extremely combative and agitated. Pt hit head against bathtub and attempted to wrap curtain around neck stating \"just kill me!\" Pt has hx of dementia. Pt arrives non-verbal GCS 10. Pt has abrasions and skin tears all over body. Arrives extremely hypothermic- core rectal temp 86 degrees F  Vitals:    12/12/24 0326   Pulse: 98   Resp: 20   Temp: (!) 30 °C (86 °F)         "

## 2024-12-12 NOTE — ASSESSMENT & PLAN NOTE
Had recurrent AF with RVR in the ED  Placed Amio infusion and drip    No known history  Continue amio infusion for the time being  TSH 4.7  TTE pending

## 2024-12-12 NOTE — WOUND TEAM
"[facility]  Inpatient Services  Initial Wound and Skin Care Evaluation    Admission Date: 2024     Last order of IP CONSULT TO WOUND CARE was found on 2024 from Hospital Encounter on 2024     HPI, PMH, SH: Reviewed    Past Surgical History:   Procedure Laterality Date    KNEE ARTHROPLASTY TOTAL Left 2017    Procedure: KNEE ARTHROPLASTY TOTAL;  Surgeon: Ermias Griffith M.D.;  Location: SURGERY [facility];  Service:     KNEE ARTHROSCOPY Right 2009    meniscus tear    TRIGGER FINGER RELEASE Right 2009    index finger    BICEPS TENDON REPAIR Right 2008    CERVICAL DISK AND FUSION ANTERIOR  2008    C4-C5    CATARACT PHACO WITH IOL Bilateral 2007    BRACHY THERAPY  2006    Prostate CA    KNEE ARTHROSCOPY Left     BRONCHOSCOPY  2002    Cancer    LOBECTOMY Left     Upper lung bronchial tube for CA    SIGMOID COLECTOMY      secondary to ruptured diverticulitis    DENTAL EXTRACTION(S)  1969    wisdom teeth    COLONOSCOPY  -present    every 3 years    HERNIA REPAIR  ,     Abdominal incisional hernia,  with mesh    TONSILLECTOMY  as child     Social History     Tobacco Use    Smoking status: Former     Current packs/day: 0.00     Average packs/day: 3.0 packs/day for 38.0 years (114.0 ttl pk-yrs)     Types: Cigarettes     Start date: 1963     Quit date: 2001     Years since quittin.9    Smokeless tobacco: Never   Substance Use Topics    Alcohol use: Yes     Alcohol/week: 0.0 oz     Comment: occ     Chief Complaint   Patient presents with    Head Injury    ALOC     Diagnosis: Accidental hypothermia, initial encounter [T68.XXXA]    Unit where seen by Wound Team: T626/00     WOUND CONSULT RELATED TO:  \"L hand/forearm/elbow skin tears, R forearm/hand skin tears, nose, L 2nd and 3rd toe, L hip\"    WOUND TEAM PLAN OF CARE - Frequency of Follow-up:   Nursing to follow dressing orders written for wound care. Contact wound team if area fails to " progress, deteriorates or with any questions/concerns if something comes up before next scheduled follow up (See below as to whether wound is following and frequency of wound follow up)   Not following, consult as needed  - Not following any of the above consulted areas.    WOUND HISTORY:   79 y.o. male admitted for evaluation of head injury. Patient has history of dementia and was combative when family was attempting to shower him, patient hit his head against shower door.       WOUND ASSESSMENT/LDA  Wound 12/12/24 L Hip, R Lateral Knee, & L 1st MTH; POA Stage 1 vs trauma (Active)   Date First Assessed/Time First Assessed: 12/12/24 0724   Present on Original Admission: Yes  Wound Description (Comments): L Hip, R Lateral Knee, & L 1st MTH; POA Stage 1 vs trauma      Assessments 12/12/2024 12:00 PM   Wound Image       Site Assessment Red   Periwound Assessment Ecchymosis   Margins Defined edges;Attached edges   Closure Adhesive bandage   Drainage Amount None   Treatments Offloading   Dressing Status Clean;Dry;Intact   Dressing Changed Changed   Dressing Cleansing/Solutions Not Applicable   Dressing Options Offloading Dressing - Sacral   Dressing Change/Treatment Frequency Every 72 hrs, and As Needed   NEXT Dressing Change/Treatment Date 12/15/24   NEXT Weekly Photo (Inpatient Only) 12/19/24   Wound Team Following Not following       Wound 12/12/24 Skin Tear Elbow Left (Active)   Date First Assessed/Time First Assessed: 12/12/24 1231   Present on Original Admission: Yes  Primary Wound Type: Skin Tear  Location: Elbow  Laterality: Left      Assessments 12/12/2024 12:00 PM   Wound Image     Site Assessment Red   Periwound Assessment Fragile;Ecchymosis   Margins Defined edges;Attached edges   Closure Secondary intention   Drainage Amount Scant   Drainage Description Serosanguineous   Treatments Cleansed   Wound Cleansing Normal Saline Irrigation   Dressing Status Clean;Dry;Intact   Dressing Changed Changed   Dressing  Cleansing/Solutions Not Applicable   Dressing Options Mepitel One;Petroleum Gauze (clear);Silicone Adhesive Foam   Dressing Change/Treatment Frequency Daily, and As Needed   NEXT Dressing Change/Treatment Date 12/13/24   NEXT Weekly Photo (Inpatient Only) 12/19/24   Wound Team Following Not following   Non-staged Wound Description Partial thickness        Vascular:    SANDRA:   No results found.    Lab Values:    Lab Results   Component Value Date/Time    WBC 20.8 (H) 12/12/2024 03:57 AM    RBC 3.97 (L) 12/12/2024 03:57 AM    HEMOGLOBIN 10.6 (L) 12/12/2024 03:57 AM    HEMATOCRIT 32.8 (L) 12/12/2024 03:57 AM    HBA1C 5.6 01/02/2024 11:08 AM         Culture Results show:  No results found for this or any previous visit (from the past 720 hours).    Pain Level/Medicated:  None, Tolerated without pain medication       INTERVENTIONS BY WOUND TEAM:  Chart and images reviewed. Discussed with bedside RN. All areas of concern (based on picture review, LDA review and discussion with bedside RN) have been thoroughly assessed. Documentation of areas based on significant findings. This RN in to assess patient. Performed standard wound care which includes appropriate positioning, dressing removal and non-selective debridement. Pictures and measurements obtained weekly if/when required.    Wound:  L Elbow  Preparation for Dressing removal: Removed without difficulty  Cleansed/Non-selectively Debrided with:  Normal Saline and Gauze  Primary Dressing:  Mepitel one, petroleum gauze  Secondary (Outer) Dressing: Silicone adhesive foam     Wound:  L Hip, R lateral knee, L foot  Primary Dressing:  Offloading adhesive foam    Advanced Wound Care Discharge Planning  Number of Clinicians necessary to complete wound care: 1  Is patient requiring IV pain medications for dressing changes:  No   Length of time for dressing change 15 min. (This does not include chart review, pre-medication time, set up, clean up or time spent  charting.)    Interdisciplinary consultation: Patient, Bedside RN    EVALUATION / RATIONALE FOR TREATMENT:     Date:  12/12/24  Wound Status:  Initial evaluation    BUE with multiple, scattered skin tears. All already covered with Mepitel one, recommend continued usage until resolution of wounds.  L elbow with larger skin tear with scant serosanguinous drainage, petroleum gauze applied over the mepitel one to act as a hydrating, non-stick layer.  L Hip, R lateral knee, & L dorsal foot with POA stage 1 pressure injuries vs trauma from possibility of patient haven fallen prior to arrival. Recommend offloading measures, offloading adhesive foams applied.  Nose with ecchymosis from trauma, can remain DOMINGO, no interventions indicated at this time.           Goals: Steady decrease in wound area and depth weekly.    NURSING PLAN OF CARE ORDERS:  Dressing changes: See Dressing Care orders  RN Prevention Protocol    NUTRITION RECOMMENDATIONS   Wound Team Recommendations:  N/A    DIET ORDERS (From admission to next 24h)       Start     Ordered    12/12/24 0630  Diet NPO Restrict to: Strict  ALL MEALS        Question:  Diet NPO Restrict to:  Answer:  Strict    12/12/24 0633                    PREVENTATIVE INTERVENTIONS:    Q shift Clemente - performed per nursing policy  Q shift pressure point assessments - performed per nursing policy    Surface/Positioning  ICU Low Airloss - Currently in Place  Reposition q 2 hours - Currently in Place  TAPs Turning system - Currently in Place    Offloading/Redistribution  Float Heels off Bed with Pillows - Currently in Place         Anticipated discharge plans:  TBD        Vac Discharge Needs:  Vac Discharge plan is purely a recommendation from wound team and not a requirement for discharge unless otherwise stated by physician.  Not Applicable Pt not on a wound vac

## 2024-12-12 NOTE — PROGRESS NOTES
Pharmacy Vancomycin Kinetics Note for 12/12/2024     79 y.o. male on Vancomycin day # 1     Vancomycin Indication (Trough based Dosing): Severe or complicated infection (goal trough 15-20)    Provider specified end date: 12/19/24    Active Antibiotics (From admission, onward)      Ordered     Ordering Provider       Thu Dec 12, 2024 10:18 AM    12/12/24 1018  cefTRIAXone (Rocephin) syringe 2,000 mg  EVERY 24 HOURS         Dayna Bee M.D.    12/12/24 1018  MD Alert...Vancomycin per Pharmacy  (MD Alert...Vancomycin per Pharmacy)  PHARMACY TO DOSE        Question:  Indication(s) for vancomycin?  Answer:  Unknown source of infection    Dayna Bee M.D.            Dosing Weight: 54.4 kg (119 lb 14.9 oz)      Admission History: Admitted on 12/12/2024 for Accidental hypothermia, initial encounter [T68.XXXA]  Pertinent history: Patient started on empiric antibiotics to cover for possible septic shock of unknown source.    Allergies:     Ampicillin, Demerol, and Clindamycin     Pertinent cultures to date:     Results       Procedure Component Value Units Date/Time    MRSA By PCR (Amp) [[id number]] Collected: 12/12/24 0845    Order Status: Completed Specimen: Respirate from Nares Updated: 12/12/24 1227     MRSA by PCR Negative    CoV-2, Flu A/B, And RSV by PCR (Cepheid) [[id number]] Collected: 12/12/24 0845    Order Status: Completed Specimen: Respirate from Nasal Updated: 12/12/24 1020     Influenza virus A RNA Negative     Influenza virus B, PCR Negative     RSV, PCR Negative     SARS-CoV-2 by PCR NotDetected     Comment: [facility] providers: PLEASE REFER TO DE-ESCALATION AND RETESTING PROTOCOL  on [email or web address]    **The CepDinos Ruleid GeneXpert Xpress SARS-CoV-2 RT-PCR Test has been made  available for use under the Emergency Use Authorization (EUA) only.          SARS-CoV-2 Source NP Swab    MRSA By PCR (Amp) [[id number]]     Order Status: Canceled Specimen: Respirate from Nares     URINALYSIS [[id number]]  (Abnormal)  Collected: 12/12/24 0845    Order Status: Completed Specimen: Urine Updated: 12/12/24 1007     Color Yellow     Character Clear     Specific Gravity 1.013     Ph 6.5     Glucose Negative mg/dL      Ketones Trace mg/dL      Protein 30 mg/dL      Bilirubin Negative     Urobilinogen, Urine 1.0 EU/dL      Nitrite Negative     Leukocyte Esterase Negative     Occult Blood Moderate     Micro Urine Req Microscopic    Urine Culture (New) [[id number]] Collected: 12/12/24 0845    Order Status: Sent Specimen: Urine Updated: 12/12/24 0905    BLOOD CULTURE [[id number]] Collected: 12/12/24 0357    Order Status: Completed Specimen: Blood from Peripheral Updated: 12/12/24 0608     Significant Indicator NEG     Source BLD     Site PERIPHERAL     Culture Result No Growth  Note: Blood cultures are incubated for 5 days and  are monitored continuously.Positive blood cultures  are called to the RN and reported as soon as  they are identified.      BLOOD CULTURE [[id number]] Collected: 12/12/24 0410    Order Status: Completed Specimen: Blood from Peripheral Updated: 12/12/24 0608     Significant Indicator NEG     Source BLD     Site PERIPHERAL     Culture Result No Growth  Note: Blood cultures are incubated for 5 days and  are monitored continuously.Positive blood cultures  are called to the RN and reported as soon as  they are identified.      URINALYSIS [[id number]]     Order Status: Canceled Specimen: Urine             Labs:     Estimated Creatinine Clearance: 36.1 mL/min (by C-G formula based on SCr of 1.32 mg/dL).  Recent Labs     12/12/24 0357   WBC 20.8*   NEUTSPOLYS 88.80*   BANDSSTABS 0.90     Recent Labs     12/12/24 0357   BUN 28*   CREATININE 1.32   ALBUMIN 3.5       Intake/Output Summary (Last 24 hours) at 12/12/2024 1555  Last data filed at 12/12/2024 1400  Gross per 24 hour   Intake 40.6 ml   Output 175 ml   Net -134.4 ml      Blood Pressure 97/45   Pulse (Abnormal) 150   Temperature (Abnormal) 35.6 °C (96 °F) (Temporal)    "Respiration (Abnormal) 23   Height 1.702 m (5' 7\")   Weight 54.4 kg (120 lb)   Oxygen Saturation (Abnormal) 81%  Temp (24hrs), Av.8 °C (91 °F), Min:30 °C (86 °F), Max:35.6 °C (96 °F)      List concerns for Vancomycin clearance:     Age;Hypermetabolic State (SIRS);Pressors/Hypotension;BUN/Scr ratio greater than 20:1    Pharmacokinetics:     Trough kinetics:   No results for input(s): \"VANCOTROUGH\", \"VANCOPEAK\", \"VANCORANDOM\" in the last 72 hours.    A/P:     -  Vancomycin dose: 25 mg/kg load x 1     -  Next vancomycin level(s):    - Vr @ 0600    -  Comments: MRSA nares negative.  Ordering physician would like to continue vancomycin for today pending more time for other culture findings.  Not appropriate for scheduled dosing given hypotension on vasoactive medication and inadequate UOP.  Plan for ~21 hour random level tomorrow morning to guide subsequent dosing.      Isamar Gonzalez, PharmD, BCPS, BCCCP      "

## 2024-12-12 NOTE — ASSESSMENT & PLAN NOTE
Unclear etiology  Lactate predominantly, possible contributing ketosis  Blood glucose WNL, no diabetes so far as we know, no SGLTi on med list  Improved    Volume resus  Empiric abx  Thiamine  Trend

## 2024-12-12 NOTE — CONSULTS
Critical Care Consultation    Date of consult: 12/12/2024    Referring Physician  EM    Reason for Consultation  AMS, hypothyroid    History of Presenting Illness  79 y.o. male who presented 12/12/2024 with past medical history of advanced Alzheimer's diagnosis with declining functional status over the last year to 2, history of prostate cancer status post radiation, also some reported remote history of lung cancer status post partial lobectomy in 1998 reported diabetes emphysema who presented to the emergency department by EMS after family found him in the bathroom, he had soiled himself and was reportedly in the shower and when they turned the water off he became very aggressive with them and combative and reportedly tried to choke himself with a shower curtain and then intentionally hit his head against the shower door.  The family states that this is not entirely out of sync with his increased episodic aggression over the last weeks to months but not entirely himself either.  Patient was brought to ED where he was noted to have marked agitation, and hemodynamic derangements including intermittent hypotension that was volume responsive, and some tacky arrhythmias which by 1 EKG certainly look like atrial fibrillation but he was very tremulous and very difficult to get a good tracing.  The patient was given 2 liter of IV fluids had a right femoral CVC placed and was placed on norepinephrine which was then quickly titrated off with the administration of crystalloid.  Of note the patient was markedly hypothermic at arrival with a core temperature of approximately 30      POCUS LV fxn preserved LVOT VTI 26, no obvious AI, trace MR, RV mildly dialted with noted TR on color, PASP ~20-25 with low RAP, IVC small and nearly 100% collapsible. LA and RA appear qualitatively enlarged. There is a small pericardial effusion that appears to be contained near the RV free wall not free flowing or circumferential, no  tamponade    Code Status  DNAR/DNI    Review of Systems  Review of Systems   Unable to perform ROS: Critical illness       Past Medical History   has a past medical history of Anesthesia, Anxiety, Arm pain, left (01/07/2014), Arthritis, Asthma, Bilateral impacted cerumen (12/02/2019), Breath shortness (04/21/2017), CAD (coronary artery disease), Cancer ([facility]) (2002, 2006), Carcinoma in situ of respiratory system (1998), Cataract (2007), Colon polyps, Coronary atherosclerosis, Depression, Diabetes ([facility]), Diverticulitis, EKG abnormal (2000), Emphysema of lung ([facility]), Essential hypertension, benign (06/28/2013), Essential hypertension, malignant, Exposure, Hand pain, right (05/14/2010), Heart attack ([facility]), High cholesterol, Lung cancer ([facility]), Major depressive disorder (06/12/2018), Marital problems (12/08/2016), Mixed hyperlipidemia, Nephrolithiasis (1967, 1968), Osteoarthrosis involving, or with mention of more than one site, but not specified as generalized, multiple sites, Pain (04/21/2017), Personal history of malignant neoplasm of bronchus and lung, Personal history of malignant neoplasm of prostate, Pneumonia (2015), Prediabetes (01/07/2014), Prostate cancer ([facility]) (2006), Skin lesion (09/14/2019), Spinal fusion, Tachyarrhythmia (12/12/2024), Unspecified vitamin D deficiency (05/14/2010), and Upper back pain on right side (09/14/2019).    He has no past medical history of Anemia, Clotting disorder ([facility]), Diabetic neuropathy ([facility]), GERD (gastroesophageal reflux disease), Glaucoma, Heart murmur, HIV (human immunodeficiency virus infection) ([facility]), Migraine, Seizure ([facility]), Stroke ([facility]), or Thyroid disease.    Surgical History   has a past surgical history that includes biceps tendon repair (Right, 2008); hernia repair (2002, 2003); knee arthroscopy (Right, 2009); sigmoid colectomy (2001); trigger finger release (Right, 2009); knee arthroscopy (Left, 2003); bronchoscopy (2002); lobectomy (Left, 2002); cervical disk and  fusion anterior (2008); cataract phaco with iol (Bilateral, 2007); brachy therapy (2006); tonsillectomy (as child); dental extraction(s) (1969); colonoscopy (2001-present); and knee arthroplasty total (Left, 5/5/2017).    Family History  family history is not on file. He was adopted.    Social History   reports that he quit smoking about 23 years ago. His smoking use included cigarettes. He started smoking about 61 years ago. He has a 114 pack-year smoking history. He has never used smokeless tobacco. He reports current alcohol use. He reports that he does not use drugs.    Medications  Home Medications       Reviewed by Leyda Jones (Pharmacy Tech) on 12/12/24 at 1050  Med List Status: Complete     Medication Last Dose Status   albuterol (PROVENTIL) 2.5mg/3ml Nebu Soln solution for nebulization Unknown Active   albuterol 108 (90 Base) MCG/ACT Aero Soln inhalation aerosol Unknown Active   cetirizine (ZYRTEC) 10 MG Tab 12/10/2024 Active   Cyanocobalamin (VITAMIN B-12 PO) 12/10/2024 Active   donepezil (ARICEPT) 10 MG tablet 12/10/2024 Active   MAGNESIUM-ZINC PO 12/10/2024 Active   Multiple Vitamins-Minerals (ICAPS) Tab 12/10/2024 Active   multivitamin Tab 12/10/2024 Active   vitamin D3 (CHOLECALCIFEROL) 1000 Unit (25 mcg) Tab 12/10/2024 Active                  Audit from Redirected Encounters    **Home medications have not yet been reviewed for this encounter**       Current Facility-Administered Medications   Medication Dose Route Frequency Provider Last Rate Last Admin    dextrose 5% infusion   Intravenous Continuous Eusebio Mayberry M.D.   Stopped at 12/12/24 0541    thiamine (B-1) injection 200 mg  200 mg Intravenous BID Juan Jay M.D.   200 mg at 12/12/24 0909    norepinephrine (Levophed) 8 mg in 250 mL NS infusion (premix)  0-1 mcg/kg/min Intravenous Continuous Dayna Bee M.D. 10.2 mL/hr at 12/12/24 1158 0.1 mcg/kg/min at 12/12/24 1158    And    vasopressin (Vasostrict) 20 units in  mL  infusion  0.03 Units/min Intravenous Continuous Dayna Bee M.D.        senna-docusate (Pericolace Or Senokot S) 8.6-50 MG per tablet 2 Tablet  2 Tablet Oral Q EVENING Dayna Bee M.D.        And    polyethylene glycol/lytes (Miralax) Packet 1 Packet  1 Packet Oral QDAY PRN Dayna Bee M.D.        [START ON 12/13/2024] cefTRIAXone (Rocephin) syringe 2,000 mg  2,000 mg Intravenous Q24HRS Dayna Bee M.D. MD Alert...Vancomycin per Pharmacy   Other PHARMACY TO DOSE Dayna Bee M.D.        amiodarone (Cordarone) tablet 200 mg  200 mg Oral DAILY Hayes Nash M.D.           Allergies  Allergies   Allergen Reactions    Ampicillin Unspecified     CAUSED A CHILDHOOD REACTION    Demerol Unspecified     CAUSES ECCHYMOSIS AND IS INEFFECTIVE    Clindamycin Diarrhea             Vital Signs last 24 hours  Temp:  [30 °C (86 °F)-35.6 °C (96 °F)] 35.6 °C (96 °F)  Pulse:  [] 150  Resp:  [14-27] 23  BP: ()/() 97/45  SpO2:  [68 %-100 %] 81 %    Physical Exam  Physical Exam  Constitutional:       General: He is in acute distress.      Appearance: He is ill-appearing.   HENT:      Head: Normocephalic.      Mouth/Throat:      Mouth: Mucous membranes are dry.   Eyes:      Pupils: Pupils are equal, round, and reactive to light.   Cardiovascular:      Rate and Rhythm: Regular rhythm. Bradycardia present.      Pulses: Normal pulses.   Pulmonary:      Effort: No respiratory distress.      Breath sounds: No rales.   Abdominal:      General: Abdomen is flat.      Palpations: Abdomen is soft.   Musculoskeletal:      Cervical back: Neck supple.      Right lower leg: No edema.      Left lower leg: No edema.      Comments: Bruises n UEs diffuse, no focal deformities   Skin:     General: Skin is warm.      Capillary Refill: Capillary refill takes less than 2 seconds.      Comments: Multiple bruises of varying degrees of healing throughout dixie and on arms   Neurological:      Comments: Confused  disoriented  With effort I got him to tell me his nme, and ultimately he told me he was in hospitl, though unable to provide any other info  Doesn't answer question appropritely but his speech is intelligble and fluent    His eye initially seemed preferentially right and wasn't attending to me on the right though after ahile he did look to his left both spontaneously and to command    RENAE though downt totally follow  He seems hypertonic and somewhat spastic though no jesus rigidity or replicable clonus    No focal defecits noted         Fluids    Intake/Output Summary (Last 24 hours) at 2024 1315  Last data filed at 2024 1228  Gross per 24 hour   Intake 40.6 ml   Output --   Net 40.6 ml       Laboratory  Recent Results (from the past 48 hours)   EKG    Collection Time: 24  3:40 AM   Result Value Ref Range    Report       [facility] Emergency Dept.    Test Date:  2024  Pt Name:    OSMANY RAMOS                Department: ER  MRN:        [id number]                      Room:       [facility] [place]  Gender:     Male                         Technician: [id number]  :        1945                   Requested By:EUSEBIO MAYBERRY  Order #:    [id number]                    Reading MD: Eusebio Mayberry    Measurements  Intervals                                Axis  Rate:       97                           P:          0  FL:         0                            QRS:        68  QRSD:       182                          T:          -84  QT:         447  QTc:        568    Interpretive Statements  Initial EKG showed artifact with slow misinterpretation, Noriega waves,  likely sinus rhythm with first-degree AV block.  No ST elevation obvious  given limitation of severe artifact  Electronically Signed On 2024 06:38:17 PST by Eusebio Mayberry     CBC WITH DIFFERENTIAL    Collection Time: 24  3:57 AM   Result Value Ref Range    WBC 20.8 (H) 4.8 - 10.8 K/uL    RBC 3.97 (L) 4.70 - 6.10 M/uL     Hemoglobin 10.6 (L) 14.0 - 18.0 g/dL    Hematocrit 32.8 (L) 42.0 - 52.0 %    MCV 82.6 81.4 - 97.8 fL    MCH 26.7 (L) 27.0 - 33.0 pg    MCHC 32.3 32.3 - 36.5 g/dL    RDW 41.9 35.9 - 50.0 fL    Platelet Count 603 (H) 164 - 446 K/uL    MPV 8.6 (L) 9.0 - 12.9 fL    Neutrophils-Polys 88.80 (H) 44.00 - 72.00 %    Lymphocytes 6.90 (L) 22.00 - 41.00 %    Monocytes 0.80 0.00 - 13.40 %    Eosinophils 0.00 0.00 - 6.90 %    Basophils 0.00 0.00 - 1.80 %    Nucleated RBC 0.00 0.00 - 0.20 /100 WBC    Neutrophils (Absolute) 18.66 (H) 1.82 - 7.42 K/uL    Lymphs (Absolute) 1.44 1.00 - 4.80 K/uL    Monos (Absolute) 0.17 0.00 - 0.85 K/uL    Eos (Absolute) 0.00 0.00 - 0.51 K/uL    Baso (Absolute) 0.00 0.00 - 0.12 K/uL    NRBC (Absolute) 0.00 K/uL    Anisocytosis 1+     Microcytosis 1+    COMP METABOLIC PANEL    Collection Time: 12/12/24  3:57 AM   Result Value Ref Range    Sodium 142 135 - 145 mmol/L    Potassium 3.9 3.6 - 5.5 mmol/L    Chloride 106 96 - 112 mmol/L    Co2 18 (L) 20 - 33 mmol/L    Anion Gap 18.0 (H) 7.0 - 16.0    Glucose 98 65 - 99 mg/dL    Bun 28 (H) 8 - 22 mg/dL    Creatinine 1.32 0.50 - 1.40 mg/dL    Calcium 9.6 8.5 - 10.5 mg/dL    Correct Calcium 10.0 8.5 - 10.5 mg/dL    AST(SGOT) 51 (H) 12 - 45 U/L    ALT(SGPT) 32 2 - 50 U/L    Alkaline Phosphatase 80 30 - 99 U/L    Total Bilirubin 0.4 0.1 - 1.5 mg/dL    Albumin 3.5 3.2 - 4.9 g/dL    Total Protein 6.8 6.0 - 8.2 g/dL    Globulin 3.3 1.9 - 3.5 g/dL    A-G Ratio 1.1 g/dL   MAGNESIUM    Collection Time: 12/12/24  3:57 AM   Result Value Ref Range    Magnesium 2.7 (H) 1.5 - 2.5 mg/dL   BLOOD CULTURE    Collection Time: 12/12/24  3:57 AM    Specimen: Peripheral; Blood   Result Value Ref Range    Significant Indicator NEG     Source BLD     Site PERIPHERAL     Culture Result       No Growth  Note: Blood cultures are incubated for 5 days and  are monitored continuously.Positive blood cultures  are called to the RN and reported as soon as  they are identified.     LACTIC ACID     Collection Time: 12/12/24  3:57 AM   Result Value Ref Range    Lactic Acid 4.9 (HH) 0.5 - 2.0 mmol/L   TSH WITH REFLEX TO FT4    Collection Time: 12/12/24  3:57 AM   Result Value Ref Range    TSH 4.730 0.380 - 5.330 uIU/mL   ESTIMATED GFR    Collection Time: 12/12/24  3:57 AM   Result Value Ref Range    GFR (CKD-EPI) 56 (A) >60 mL/min/1.73 m 2   DIFFERENTIAL MANUAL    Collection Time: 12/12/24  3:57 AM   Result Value Ref Range    Bands-Stabs 0.90 0.00 - 10.00 %    Metamyelocytes 2.60 %    Manual Diff Status PERFORMED    PERIPHERAL SMEAR REVIEW    Collection Time: 12/12/24  3:57 AM   Result Value Ref Range    Peripheral Smear Review see below    PLATELET ESTIMATE    Collection Time: 12/12/24  3:57 AM   Result Value Ref Range    Plt Estimation Increased    MORPHOLOGY    Collection Time: 12/12/24  3:57 AM   Result Value Ref Range    RBC Morphology Present     Poikilocytosis 2+     Echinocytes 2+    FREE THYROXINE    Collection Time: 12/12/24  3:57 AM   Result Value Ref Range    Free T-4 1.51 0.93 - 1.70 ng/dL   CREATINE KINASE    Collection Time: 12/12/24  3:57 AM   Result Value Ref Range    CPK Total 1331 (H) 0 - 154 U/L   BLOOD CULTURE    Collection Time: 12/12/24  4:10 AM    Specimen: Peripheral; Blood   Result Value Ref Range    Significant Indicator NEG     Source BLD     Site PERIPHERAL     Culture Result       No Growth  Note: Blood cultures are incubated for 5 days and  are monitored continuously.Positive blood cultures  are called to the RN and reported as soon as  they are identified.     Prothrombin Time    Collection Time: 12/12/24  4:10 AM   Result Value Ref Range    PT 14.3 12.0 - 14.6 sec    INR 1.11 0.87 - 1.13   APTT    Collection Time: 12/12/24  4:10 AM   Result Value Ref Range    APTT 25.8 24.7 - 36.0 sec   EKG    Collection Time: 12/12/24  4:39 AM   Result Value Ref Range    Report       [facility] Emergency Dept.    Test Date:  2024-12-12  Pt Name:    OSMANY RAMOS                 Department: ER  MRN:        [id number]                      Room:       [facility] [place]  Gender:     Male                         Technician: [id number]  :        1945                   Requested By:EUSEBIO ERIC  Order #:    [id number]                    Reading MD: Eusebio Eric    Measurements  Intervals                                Axis  Rate:       104                          P:          0  CA:         0                            QRS:        68  QRSD:       163                          T:          266  QT:         390  QTc:        513    Interpretive Statements    EKG showing A-fib, RVR, ST depressions in the anterior leads, septal leads.  No obvious reciprocal changes however limited interpretation due to severe  artifact.  Electronically Signed On 2024 06:39:15 PST by Eusebio Eric     EKG    Collection Time: 24  7:17 AM   Result Value Ref Range    Report       [facility]    Test Date:  2024  Pt Name:    OSMANY RAMOS                Department: ER  MRN:        [id number]                      Room:       T626  Gender:     Male                         Technician: [id number]  :        1945                   Requested By:EUSEBIO ERIC  Order #:    [id number]                    Reading MD: Chris Collins MD    Measurements  Intervals                                Axis  Rate:       65                           P:          61  CA:         68                           QRS:        34  QRSD:       137                          T:          66  QT:         454  QTc:        473    Interpretive Statements  Probable sinus rhythm with baseline artifact limiting interpretation  Electronically Signed On 2024 08:41:49 PST by Chris Collins MD     POCT glucose device results    Collection Time: 24  8:39 AM   Result Value Ref Range    POC Glucose, Blood 94 65 - 99 mg/dL   URINALYSIS    Collection Time: 24  8:45 AM    Specimen: Urine   Result Value Ref Range    Color Yellow      Character Clear     Specific Gravity 1.013 <1.035    Ph 6.5 5.0 - 8.0    Glucose Negative Negative mg/dL    Ketones Trace (A) Negative mg/dL    Protein 30 (A) Negative mg/dL    Bilirubin Negative Negative    Urobilinogen, Urine 1.0 <=1.0 EU/dL    Nitrite Negative Negative    Leukocyte Esterase Negative Negative    Occult Blood Moderate (A) Negative    Micro Urine Req Microscopic    TROPONIN    Collection Time: 12/12/24  8:45 AM   Result Value Ref Range    Troponin T 58 (H) 6 - 19 ng/L   MRSA By PCR ([facility])    Collection Time: 12/12/24  8:45 AM    Specimen: Nares; Respirate   Result Value Ref Range    MRSA by PCR Negative Negative   CoV-2, Flu A/B, And RSV by PCR ([employer])    Collection Time: 12/12/24  8:45 AM    Specimen: Nasal; Respirate   Result Value Ref Range    Influenza virus A RNA Negative Negative    Influenza virus B, PCR Negative Negative    RSV, PCR Negative Negative    SARS-CoV-2 by PCR NotDetected     SARS-CoV-2 Source NP Swab    CORTISOL    Collection Time: 12/12/24  8:45 AM   Result Value Ref Range    Cortisol 44.2 (H) 0.0 - 23.0 ug/dL   LACTIC ACID    Collection Time: 12/12/24  8:45 AM   Result Value Ref Range    Lactic Acid 1.8 0.5 - 2.0 mmol/L   CREATINE KINASE    Collection Time: 12/12/24  8:45 AM   Result Value Ref Range    CPK Total 1903 (HH) 0 - 154 U/L   URINE MICROSCOPIC (W/UA)    Collection Time: 12/12/24  8:45 AM   Result Value Ref Range    WBC 0-2 /hpf    RBC 6-10 (A) 0 - 2 /hpf    Bacteria Few (A) None /hpf    Epithelial Cells 3-5 0 - 5 /hpf    Trans Epithelial Cells Present (A) Absent /hpf    Amorphous Crystal Present (A) Absent /hpf    Urine Casts 6-10 (A) 0 - 2 /lpf    Hyaline Cast Present /lpf   EKG    Collection Time: 12/12/24  9:25 AM   Result Value Ref Range    Report       [facility]    Test Date:  2024-12-12  Pt Name:    OSMANY RAMOS                Department: 161  MRN:        [id number]                      Room:       [facility]  Gender:     Male                          Technician: JAKY  :        1945                   Requested By:SY DAVILA  Order #:    [id number]                    Reading MD: Chris Collins MD    Measurements  Intervals                                Axis  Rate:       81                           P:          85  WY:         183                          QRS:        -19  QRSD:       110                          T:          59  QT:         401  QTc:        466    Interpretive Statements  Sinus rhythm  Low voltage, extremity leads  Minimal ST elevation, anterior leads  Cannot exclude prior inferior infarct  Electronically Signed On 2024 09:53:51 PST by Chris Collins MD     EC-ECHOCARDIOGRAM COMPLETE W/O CONT    Collection Time: 24 10:42 AM   Result Value Ref Range    Eject.Frac. MOD BP 69.48     Eject.Frac. MOD 4C 77.27     Eject.Frac. MOD 2C 59.26     Left Ventrical Ejection Fraction 70        Imaging  EC-ECHOCARDIOGRAM COMPLETE W/O CONT   Final Result      DX-CHEST-PORTABLE (1 VIEW)   Final Result         1.  No acute cardiopulmonary disease.   2.  Atherosclerosis      CT-CSPINE WITHOUT PLUS RECONS   Final Result         1.  Multilevel degenerative changes of the cervical spine limit diagnostic sensitivity of this examination, otherwise no acute traumatic bony injury of the cervical spine is apparent.      CT-HEAD W/O   Final Result         1.  No acute intracranial abnormality is identified, there are nonspecific white matter changes, commonly associated with small vessel ischemic disease.  Associated mild cerebral atrophy is noted.   2.  Focus of air superior to the left globe anteriorly, could be within vascular structure related to intravenous access, otherwise of uncertain significance.   3.  Atherosclerosis.                   Assessment/Plan  * Accidental hypothermia, initial encounter- (present on admission)  Assessment & Plan  Found in the shower    External warming measures  Volume resus  Trend acid base status  Correct  electrolyte abnormalities      Rhabdomyolysis  Assessment & Plan  Initial CPK in the 3k range, likely due to injury, hypothermia and attendant shivering    Aggressive volume resus up front  mIVF as needed depending on intake  UOP appears ok  Trend CPK   Maintain euvolemia  Likely quite distributive and vasodilated with the hypothermia and subsequent rewarming     High anion gap metabolic acidosis  Assessment & Plan  Unclear etiology  Lactate predominantly, possible contributing ketosis  Blood glucose WNL, no diabetes so far as we know, no SGLTi on med list    Volume resus  Empiric abx  Thiamine  Trend    Tachyarrhythmia  Assessment & Plan  Reported AF RVR in ED  EKG difficult to interpret but appears to be sections of sinus with ortiz waves, and a section of AF RVR with some ST changes  Repeat EKG in the ICU once clinically stable shows sinus without STEMI  Placed on Amio in ED, will discontinue for the time being and monitor for recurrence    No history of AF  Not a great candidate fro AC, given falls, worsening functional status, and as todays events indicate is at risk of head injury and a bleed risk from trauma will reevaluate with the family    TSH WNL  Optimize electrolytes  Volume resus  Tele  EKG PRN  TTE ordered, bedside US with no overt cardiac lesions, though he does have RA enlargement and TR          Hypotension  Assessment & Plan  Recurrent in ED, volume responsive  POCUS suggests low RAP high SV state, distributive versus hypovolemic  Likely quite hypovolemic/distributive given hypothermia and subsequent vasoplegia associated with rewarming, will likely need quite a bit of fluids and some vasopressor to support hemos    Unclear if this is sepsis but will volume resus and give empiric abx  EKG difficult to interpret but no overt ischemia, follow troponin  CXR without focal infiltrate  UA pending    Volume resus  Lactate 4.9  Seems volume tolerant will continue to bolus (30/kg given) crystalloid for  now  Norepi to maintain MAP > 65  Thiamine  Empiric Vanc and CTX for now, deescalate as able      CAD (coronary artery disease)- (present on admission)  Assessment & Plan  Noted on MPI, history not entirely clear as of yet  Not on AT therapy  Initial EKG with some ischemic appearing ST depressions but difficult to discern from AF with some NSST  No evidence of cardiogenic shock or WMA or depressed fxn on bedside sono    TTE ordered  Cycle trops  EKG PRN        Discussed patient condition and risk of morbidity and/or mortality with Family, RN, RT, Therapies, Pharmacy, and Dietary.    The patient remains critically ill.  Critical care time = 85 minutes in directly providing and coordinating critical care and extensive data review.  No time overlap and excludes procedures.

## 2024-12-12 NOTE — ASSESSMENT & PLAN NOTE
Likely secondary to lactic acidosis, will trend BMP. UA with + ketosis.   IVF resuscitation as noted above  IV thiamine ordered

## 2024-12-12 NOTE — ED NOTES
Notify erp pt having afib rvr, HR went to 200's then dropped to 40's-6-'s and that BP dropped again

## 2024-12-12 NOTE — ED NOTES
Bear hugger applied underneath patient due to patient constantly moving around. Warm blankets applied.

## 2024-12-12 NOTE — ASSESSMENT & PLAN NOTE
Reported AF RVR in ED  EKG difficult to interpret but appears to be sections of sinus with ortiz waves, and a section of AF RVR with some ST changes  Repeat EKG in the ICU once clinically stable shows sinus without STEMI  Placed on Amio in ED, will discontinue for the time being and monitor for recurrence    No history of AF  Not a great candidate for AC, given falls, worsening functional status, and as presenting events indicate is at risk of head injury and a bleed risk from trauma will reevaluate with the family    TSH WNL  Optimize electrolytes  Continue volume resuscitation based on serial hemodynamic evaluation, appears hypovolemic overall    Tele  EKG PRN  TTE

## 2024-12-12 NOTE — CONSULTS
Reason for Consult:  Asked by Dr Juan Jay M.D. to see this patient with AF    CC:   Chief Complaint   Patient presents with    Head Injury    ALOC       HPI:      79 year old man PMH dementia, CAD on MPI, lung and prostate CA hx, COPD, HTN, HLD presents with traumatic syncope. Consult for paroxysmal AF. Patient denies cardiac complaints. Poor historian however. Converted to sinus rhythm. Not on anticoagulation due to risk of falls and home care issues.    Medications / Drug list prior to admission:  No current facility-administered medications on file prior to encounter.     Current Outpatient Medications on File Prior to Encounter   Medication Sig Dispense Refill    Multiple Vitamins-Minerals (ICAPS) Tab Take 1 Tablet by mouth every morning.      Cyanocobalamin (VITAMIN B-12 PO) Take 8 Drops by mouth every morning.      MAGNESIUM-ZINC PO Take 1 Tablet by mouth every morning.      multivitamin Tab Take 1 Tablet by mouth every day.      vitamin D3 (CHOLECALCIFEROL) 1000 Unit (25 mcg) Tab Take 2,000 Units by mouth every day.      cetirizine (ZYRTEC) 10 MG Tab Take 10 mg by mouth every day.      donepezil (ARICEPT) 10 MG tablet Take 5 mg in am x 1 week then 10 mg in am 90 Tablet 6    albuterol 108 (90 Base) MCG/ACT Aero Soln inhalation aerosol Inhale 2 Puffs every 6 hours as needed for Shortness of Breath. 1 Each 5    albuterol (PROVENTIL) 2.5mg/3ml Nebu Soln solution for nebulization 3 mL by Nebulization route every four hours as needed for Shortness of Breath. 75 mL 3       Current list of administered Medications:    Current Facility-Administered Medications:     dextrose 5% infusion, , Intravenous, Continuous, Eusebio Mayberry M.D., Stopped at 12/12/24 0541    thiamine (B-1) injection 200 mg, 200 mg, Intravenous, BID, Juan Jay M.D., 200 mg at 12/12/24 0909    norepinephrine (Levophed) 8 mg in 250 mL NS infusion (premix), 0-1 mcg/kg/min, Intravenous, Continuous, Last Rate: 10.2 mL/hr at 12/12/24  1158, 0.1 mcg/kg/min at 12/12/24 1158 **AND** vasopressin (Vasostrict) 20 units in  mL infusion, 0.03 Units/min, Intravenous, Continuous **AND** Notify physician if MAP remains less than 65 mmHg after 15 minutes of Norepinephrine and Vasopressin initiation, , , Once **AND** Initial Vasopressor Therapy for Septic Shock, , , CONTINUOUS, Dayna Bee M.D.    senna-docusate (Pericolace Or Senokot S) 8.6-50 MG per tablet 2 Tablet, 2 Tablet, Oral, Q EVENING **AND** polyethylene glycol/lytes (Miralax) Packet 1 Packet, 1 Packet, Oral, QDAY PRN, Dayna Bee M.D.    [START ON 12/13/2024] cefTRIAXone (Rocephin) syringe 2,000 mg, 2,000 mg, Intravenous, Q24HRS, Dayna Bee M.D. MD Alert...Vancomycin per Pharmacy, , Other, PHARMACY TO DOSE, Dayna Bee M.D.    Past Medical History:   Diagnosis Date    Anesthesia     2002-Wife reported pt flatlined after bronch. NO FURTHER PROBLEMS WITH FOLLOWING SURGERIES.     Anxiety     Arm pain, left 01/07/2014    Arthritis     general    Asthma     Inhalers as needed    Bilateral impacted cerumen 12/02/2019    Breath shortness 04/21/2017    States only with exess exertion. No changes to status.    CAD (coronary artery disease)     cad with an inconclusive thallium in 2004,  treated with medical management    Cancer (HCC) 2002, 2006    Lung (2002)-surgery and radiation. Prostate (2006)-brachytherapy    Carcinoma in situ of respiratory system 1998    Upper lung lobectomy and radiation therapy    Cataract 2007    Bilateral phaco with IOL    Colon polyps     NONCANCEROUS COLONIC POLYS 2004,MOST RECENT COLONOSCOPY 2005 WAS NEGATIVE    Coronary atherosclerosis     CONTROLLED    Depression     Diabetes (HCC)     Diverticulitis     TREATED WITH HEMICOLECTOMY    EKG abnormal 2000    ABN EKG W/OLD INFERIOR MI    Emphysema of lung (HCC)     COPD    Essential hypertension, benign 06/28/2013    Essential hypertension, malignant     CONTROLLED    Exposure     ORGANIC SOLVENT EXPOSURE IN  "THE 1980'S BUT HEAVY DOSES OF TYLENE AND XYLENE    Hand pain, right 05/14/2010    Heart attack (HCC)     High cholesterol     Lung cancer (HCC)     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, LEFT UPPER LOBE LOBECTOMY AND 6 WEEKS OF RADIATION IN 2001    Major depressive disorder 06/12/2018    Marital problems 12/08/2016    Mixed hyperlipidemia     CONTROLLED    Nephrolithiasis 1967, 1968    Osteoarthrosis involving, or with mention of more than one site, but not specified as generalized, multiple sites     CONTROLLED    Pain 04/21/2017    \"all over\"    Personal history of malignant neoplasm of bronchus and lung     STABLE    Personal history of malignant neoplasm of prostate     STABLE    Pneumonia 2015    Also had previously    Prediabetes 01/07/2014    Prostate cancer (HCC) 2006    TREATED WITH BRACHY THERAPY    Skin lesion 09/14/2019    Spinal fusion     C4-6, SPINAL FUSION PER DR JAY    Tachyarrhythmia 12/12/2024    Unspecified vitamin D deficiency 05/14/2010    Upper back pain on right side 09/14/2019       Past Surgical History:   Procedure Laterality Date    KNEE ARTHROPLASTY TOTAL Left 5/5/2017    Procedure: KNEE ARTHROPLASTY TOTAL;  Surgeon: Ermias Griffith M.D.;  Location: SURGERY [facility];  Service:     KNEE ARTHROSCOPY Right 2009    meniscus tear    TRIGGER FINGER RELEASE Right 2009    index finger    BICEPS TENDON REPAIR Right 2008    CERVICAL DISK AND FUSION ANTERIOR  2008    C4-C5    CATARACT PHACO WITH IOL Bilateral 2007    BRACHY THERAPY  2006    Prostate CA    KNEE ARTHROSCOPY Left 2003    BRONCHOSCOPY  2002    Cancer    LOBECTOMY Left 2002    Upper lung bronchial tube for CA    SIGMOID COLECTOMY  2001    secondary to ruptured diverticulitis    DENTAL EXTRACTION(S)  1969    wisdom teeth    COLONOSCOPY  2001-present    every 3 years    HERNIA REPAIR  2002, 2003    Abdominal incisional hernia, 2003 with mesh    TONSILLECTOMY  as child       Family History   Adopted: Yes "   Problem Relation Age of Onset    Diabetes Neg Hx      Patient family history was personally reviewed, no pertinent family history to current presentation    Social History     Socioeconomic History    Marital status:      Spouse name: Not on file    Number of children: Not on file    Years of education: Not on file    Highest education level: Not on file   Occupational History    Not on file   Tobacco Use    Smoking status: Former     Current packs/day: 0.00     Average packs/day: 3.0 packs/day for 38.0 years (114.0 ttl pk-yrs)     Types: Cigarettes     Start date: 1963     Quit date: 2001     Years since quittin.9    Smokeless tobacco: Never   Vaping Use    Vaping status: Never Used   Substance and Sexual Activity    Alcohol use: Yes     Alcohol/week: 0.0 oz     Comment: occ    Drug use: No    Sexual activity: Not Currently     Partners: Female     Birth control/protection: Post-Menopausal     Comment: , bike repair    Other Topics Concern     Service Not Asked    Blood Transfusions Not Asked    Caffeine Concern Not Asked    Occupational Exposure Not Asked    Hobby Hazards Not Asked    Sleep Concern Not Asked    Stress Concern Not Asked    Weight Concern Not Asked    Special Diet Not Asked    Back Care Not Asked    Exercise Yes     Comment: bikes and hikes most days    Bike Helmet Not Asked    Seat Belt Not Asked    Self-Exams Not Asked   Social History Narrative    Not on file     Social Drivers of Health     Financial Resource Strain: Not on file   Food Insecurity: Not on file   Transportation Needs: Not on file   Physical Activity: Not on file   Stress: Not on file   Social Connections: Not on file   Intimate Partner Violence: Not on file   Housing Stability: Not on file       ALLERGIES:  Allergies   Allergen Reactions    Ampicillin Unspecified     CAUSED A CHILDHOOD REACTION    Demerol Unspecified     CAUSES ECCHYMOSIS AND IS INEFFECTIVE    Clindamycin Diarrhea  "            Review of systems:  A complete review of symptoms was reviewed with patient. This is reviewed in H&P and PMH. ALL OTHERS reviewed and negative    Physical exam:  Patient Vitals for the past 24 hrs:   BP Temp Temp src Pulse Resp SpO2 Height Weight   12/12/24 0620 97/45 -- -- (!) 150 -- (!) 81 % -- --   12/12/24 0618 (!) 54/21 -- -- 63 -- (!) 84 % -- --   12/12/24 0609 -- -- -- 60 -- 89 % -- --   12/12/24 0608 -- (!) 35.6 °C (96 °F) Temporal -- -- -- -- --   12/12/24 0546 128/87 -- -- (!) 58 -- 92 % -- --   12/12/24 0514 (!) 132/105 -- -- (!) 55 -- 95 % -- --   12/12/24 0511 (!) 161/106 -- -- (!) 49 -- 95 % -- --   12/12/24 0510 (!) 168/108 -- -- (!) 59 -- 88 % -- --   12/12/24 0507 (!) 76/51 -- -- (!) 49 -- 98 % -- --   12/12/24 0502 (!) 75/39 -- -- (!) 43 -- 94 % -- --   12/12/24 0453 (!) 62/43 -- -- (!) 44 -- 94 % -- --   12/12/24 0428 116/86 -- -- (!) 107 -- 90 % -- --   12/12/24 0424 (!) 69/50 -- -- 85 (!) 23 100 % -- --   12/12/24 0422 (!) 62/42 -- -- 87 (!) 27 100 % -- --   12/12/24 0403 94/56 -- -- 99 -- (!) 68 % -- --   12/12/24 0334 (!) 112/90 -- -- 99 -- -- -- --   12/12/24 0327 111/84 -- -- (!) 105 14 -- 1.702 m (5' 7\") 54.4 kg (120 lb)   12/12/24 0326 -- (!) 30 °C (86 °F) Rectal 98 20 -- -- --   12/12/24 0322 -- -- -- 89 20 100 % -- --     General: No acute distress.   EYES: no jaundice  HEENT: OP clear   Neck: No bruits No JVD.   CVS:  RRR. S1 + S2. No M/R/G. No edema.  Resp: CTAB. No wheezing or crackles/rhonchi.  Abdomen: Soft, NT, ND,  Skin: Grossly nothing acute no obvious rashes  Neurological: Alert, Moves all extremities, no cranial nerve defects on limited exam  Extremities: Pulse 2+ in b/l LE. No cyanosis.     Data:  Laboratory studies personally reviewed by me:  Recent Results (from the past 24 hours)   EKG    Collection Time: 12/12/24  3:40 AM   Result Value Ref Range    Report       [facility] Emergency Dept.    Test Date:  2024-12-12  Pt Name:    OSMANY RAMOS  "               Department: ER  MRN:        [id number]                      Room:       [facility]  Gender:     Male                         Technician: [id number]  :        [id number]                   Requested By:EUSEBIO ERIC  Order #:    [id number]                    Reading MD: Eusebio Eric    Measurements  Intervals                                Axis  Rate:       97                           P:          0  NC:         0                            QRS:        68  QRSD:       182                          T:          -84  QT:         447  QTc:        568    Interpretive Statements  Initial EKG showed artifact with slow misinterpretation, Noriega waves,  likely sinus rhythm with first-degree AV block.  No ST elevation obvious  given limitation of severe artifact  Electronically Signed On 2024 06:38:17 PST by Eusebio Eric     CBC WITH DIFFERENTIAL    Collection Time: 24  3:57 AM   Result Value Ref Range    WBC 20.8 (H) 4.8 - 10.8 K/uL    RBC 3.97 (L) 4.70 - 6.10 M/uL    Hemoglobin 10.6 (L) 14.0 - 18.0 g/dL    Hematocrit 32.8 (L) 42.0 - 52.0 %    MCV 82.6 81.4 - 97.8 fL    MCH 26.7 (L) 27.0 - 33.0 pg    MCHC 32.3 32.3 - 36.5 g/dL    RDW 41.9 35.9 - 50.0 fL    Platelet Count 603 (H) 164 - 446 K/uL    MPV 8.6 (L) 9.0 - 12.9 fL    Neutrophils-Polys 88.80 (H) 44.00 - 72.00 %    Lymphocytes 6.90 (L) 22.00 - 41.00 %    Monocytes 0.80 0.00 - 13.40 %    Eosinophils 0.00 0.00 - 6.90 %    Basophils 0.00 0.00 - 1.80 %    Nucleated RBC 0.00 0.00 - 0.20 /100 WBC    Neutrophils (Absolute) 18.66 (H) 1.82 - 7.42 K/uL    Lymphs (Absolute) 1.44 1.00 - 4.80 K/uL    Monos (Absolute) 0.17 0.00 - 0.85 K/uL    Eos (Absolute) 0.00 0.00 - 0.51 K/uL    Baso (Absolute) 0.00 0.00 - 0.12 K/uL    NRBC (Absolute) 0.00 K/uL    Anisocytosis 1+     Microcytosis 1+    COMP METABOLIC PANEL    Collection Time: 24  3:57 AM   Result Value Ref Range    Sodium 142 135 - 145 mmol/L    Potassium 3.9 3.6 - 5.5 mmol/L    Chloride 106 96 - 112  mmol/L    Co2 18 (L) 20 - 33 mmol/L    Anion Gap 18.0 (H) 7.0 - 16.0    Glucose 98 65 - 99 mg/dL    Bun 28 (H) 8 - 22 mg/dL    Creatinine 1.32 0.50 - 1.40 mg/dL    Calcium 9.6 8.5 - 10.5 mg/dL    Correct Calcium 10.0 8.5 - 10.5 mg/dL    AST(SGOT) 51 (H) 12 - 45 U/L    ALT(SGPT) 32 2 - 50 U/L    Alkaline Phosphatase 80 30 - 99 U/L    Total Bilirubin 0.4 0.1 - 1.5 mg/dL    Albumin 3.5 3.2 - 4.9 g/dL    Total Protein 6.8 6.0 - 8.2 g/dL    Globulin 3.3 1.9 - 3.5 g/dL    A-G Ratio 1.1 g/dL   MAGNESIUM    Collection Time: 12/12/24  3:57 AM   Result Value Ref Range    Magnesium 2.7 (H) 1.5 - 2.5 mg/dL   BLOOD CULTURE    Collection Time: 12/12/24  3:57 AM    Specimen: Peripheral; Blood   Result Value Ref Range    Significant Indicator NEG     Source BLD     Site PERIPHERAL     Culture Result       No Growth  Note: Blood cultures are incubated for 5 days and  are monitored continuously.Positive blood cultures  are called to the RN and reported as soon as  they are identified.     LACTIC ACID    Collection Time: 12/12/24  3:57 AM   Result Value Ref Range    Lactic Acid 4.9 (HH) 0.5 - 2.0 mmol/L   TSH WITH REFLEX TO FT4    Collection Time: 12/12/24  3:57 AM   Result Value Ref Range    TSH 4.730 0.380 - 5.330 uIU/mL   ESTIMATED GFR    Collection Time: 12/12/24  3:57 AM   Result Value Ref Range    GFR (CKD-EPI) 56 (A) >60 mL/min/1.73 m 2   DIFFERENTIAL MANUAL    Collection Time: 12/12/24  3:57 AM   Result Value Ref Range    Bands-Stabs 0.90 0.00 - 10.00 %    Metamyelocytes 2.60 %    Manual Diff Status PERFORMED    PERIPHERAL SMEAR REVIEW    Collection Time: 12/12/24  3:57 AM   Result Value Ref Range    Peripheral Smear Review see below    PLATELET ESTIMATE    Collection Time: 12/12/24  3:57 AM   Result Value Ref Range    Plt Estimation Increased    MORPHOLOGY    Collection Time: 12/12/24  3:57 AM   Result Value Ref Range    RBC Morphology Present     Poikilocytosis 2+     Echinocytes 2+    FREE THYROXINE    Collection Time:  24  3:57 AM   Result Value Ref Range    Free T-4 1.51 0.93 - 1.70 ng/dL   CREATINE KINASE    Collection Time: 24  3:57 AM   Result Value Ref Range    CPK Total 1331 (H) 0 - 154 U/L   BLOOD CULTURE    Collection Time: 24  4:10 AM    Specimen: Peripheral; Blood   Result Value Ref Range    Significant Indicator NEG     Source BLD     Site PERIPHERAL     Culture Result       No Growth  Note: Blood cultures are incubated for 5 days and  are monitored continuously.Positive blood cultures  are called to the RN and reported as soon as  they are identified.     Prothrombin Time    Collection Time: 24  4:10 AM   Result Value Ref Range    PT 14.3 12.0 - 14.6 sec    INR 1.11 0.87 - 1.13   APTT    Collection Time: 24  4:10 AM   Result Value Ref Range    APTT 25.8 24.7 - 36.0 sec   EKG    Collection Time: 24  4:39 AM   Result Value Ref Range    Report       [facility]    Test Date:  2024  Pt Name:    OSMANY RAMOS                Department: ER  MRN:        [id number]                      Room:       [facility] [place]  Gender:     Male                         Technician: [id number]  :        1945                   Requested By:EUSEBIO ERIC  Order #:    [id number]                    Reading MD: Eusebio Eric    Measurements  Intervals                                Axis  Rate:       104                          P:          0  CT:         0                            QRS:        68  QRSD:       163                          T:          266  QT:         390  QTc:        513    Interpretive Statements    EKG showing A-fib, RVR, ST depressions in the anterior leads, septal leads.  No obvious reciprocal changes however limited interpretation due to severe  artifact.  Electronically Signed On 2024 06:39:15 PST by Eusebio Eric     EKG    Collection Time: 24  7:17 AM   Result Value Ref Range    Report       [facility]    Test Date:  2024  Pt  Name:    OSMANY RAMOS                Department:   MRN:        [id number]                      Room:       [facility]  Gender:     Male                         Technician: JAKY  :        1945                   Requested By:HUGH ERIC  Order #:    [id number]                    Reading MD: Chris Collins MD    Measurements  Intervals                                Axis  Rate:       65                           P:          61  OK:         68                           QRS:        34  QRSD:       137                          T:          66  QT:         454  QTc:        473    Interpretive Statements  Probable sinus rhythm with baseline artifact limiting interpretation  Electronically Signed On 2024 08:41:49 PST by Chris Collins MD     POCT glucose device results    Collection Time: 24  8:39 AM   Result Value Ref Range    POC Glucose, Blood 94 65 - 99 mg/dL   URINALYSIS    Collection Time: 24  8:45 AM    Specimen: Urine   Result Value Ref Range    Color Yellow     Character Clear     Specific Gravity 1.013 <1.035    Ph 6.5 5.0 - 8.0    Glucose Negative Negative mg/dL    Ketones Trace (A) Negative mg/dL    Protein 30 (A) Negative mg/dL    Bilirubin Negative Negative    Urobilinogen, Urine 1.0 <=1.0 EU/dL    Nitrite Negative Negative    Leukocyte Esterase Negative Negative    Occult Blood Moderate (A) Negative    Micro Urine Req Microscopic    TROPONIN    Collection Time: 24  8:45 AM   Result Value Ref Range    Troponin T 58 (H) 6 - 19 ng/L   MRSA By PCR (Amp)    Collection Time: 24  8:45 AM    Specimen: Nares; Respirate   Result Value Ref Range    MRSA by PCR Negative Negative   CoV-2, Flu A/B, And RSV by PCR (Cepheid)    Collection Time: 24  8:45 AM    Specimen: Nasal; Respirate   Result Value Ref Range    Influenza virus A RNA Negative Negative    Influenza virus B, PCR Negative Negative    RSV, PCR Negative Negative    SARS-CoV-2 by PCR NotDetected     SARS-CoV-2 Source NP Swab     CORTISOL    Collection Time: 24  8:45 AM   Result Value Ref Range    Cortisol 44.2 (H) 0.0 - 23.0 ug/dL   LACTIC ACID    Collection Time: 24  8:45 AM   Result Value Ref Range    Lactic Acid 1.8 0.5 - 2.0 mmol/L   CREATINE KINASE    Collection Time: 24  8:45 AM   Result Value Ref Range    CPK Total 1903 (HH) 0 - 154 U/L   URINE MICROSCOPIC (W/UA)    Collection Time: 24  8:45 AM   Result Value Ref Range    WBC 0-2 /hpf    RBC 6-10 (A) 0 - 2 /hpf    Bacteria Few (A) None /hpf    Epithelial Cells 3-5 0 - 5 /hpf    Trans Epithelial Cells Present (A) Absent /hpf    Amorphous Crystal Present (A) Absent /hpf    Urine Casts 6-10 (A) 0 - 2 /lpf    Hyaline Cast Present /lpf   EKG    Collection Time: 24  9:25 AM   Result Value Ref Range    Report       [facility]    Test Date:  2024  Pt Name:    OSMANY RAMOS                Department: [facility]  MRN:        [id number]                      Room:       [facility]  Gender:     Male                         Technician: JAKY  :        1945                   Requested By:SY DAVILA  Order #:    [id number]                    Reading MD: Chris Collins MD    Measurements  Intervals                                Axis  Rate:       81                           P:          85  WI:         183                          QRS:        -19  QRSD:       110                          T:          59  QT:         401  QTc:        466    Interpretive Statements  Sinus rhythm  Low voltage, extremity leads  Minimal ST elevation, anterior leads  Cannot exclude prior inferior infarct  Electronically Signed On 2024 09:53:51 PST by Chris Collins MD     EC-ECHOCARDIOGRAM COMPLETE W/O CONT    Collection Time: 24 10:42 AM   Result Value Ref Range    Eject.Frac. MOD BP 69.48     Eject.Frac. MOD 4C 77.27     Eject.Frac. MOD 2C 59.26     Left Ventrical Ejection Fraction 70        EKG 24 0925 sinus, low voltage, minimal st elevation - converted from AF  on EKG 12/12/24 5537    All pertinent features of laboratory and imaging reviewed including primary images where applicable    TTE 12/12/24  CONCLUSIONS  Normal left ventricular systolic function.  The ejection fraction is measured to be 70% by Carmona's biplane.  Normal right ventricular size and systolic function.  Estimated right ventricular systolic pressure is 21 mmHg.  Mild biatrial dilation.  Eccentric, probably mild mitral regurgitation.  Mild aortic insufficiency.  Small loculated pericardial effusion near right apical segment without   evidence of hemodynamic compromise.  Normal inferior vena cava size and inspiratory collapse.      Principal Problem:    Accidental hypothermia, initial encounter (POA: Yes)  Active Problems:    Personal history of malignant neoplasm of bronchus and lung (POA: Yes)    Personal history of malignant neoplasm of prostate (POA: Yes)    Hypotension (POA: Unknown)    Tachyarrhythmia (POA: Unknown)    High anion gap metabolic acidosis (POA: Unknown)  Resolved Problems:    Paroxysmal atrial fibrillation (HCC) (POA: Unknown)      Assessment / Plan:  79 year old man PMH dementia, CAD on MPI, lung and prostate CA hx, COPD, HTN, HLD presents with traumatic syncope. Consult for paroxysmal AF. Converted to sinus.    -continue amiodarone maintenance PO following prior IV bolus  -ideally would be on systemic anticoagulation for cva prevention; chadsvasc 4. However risk of falls and potential medication noncompliance given dementia. If should have consistent help at home or in a supervised setting can consider doac.   -will sign off. Please call with further cardiology questions.     I personally discussed his case with Dr Jay    It is my pleasure to participate in the care of Mr. Pacheco.  Please do not hesitate to contact me with questions or concerns.    Hayes Nash MD  Cardiologist [facility]    I spent 65 minutes with Jamie Pacheco,  over fifty percent was spent counseling the patient on their condition, best management practices, reviewing test results, risks and benefits of treatment and coordinating care.

## 2024-12-12 NOTE — ASSESSMENT & PLAN NOTE
Initial CPK in the 3k range, likely due to injury, hypothermia and attendant shivering    Aggressive volume resus up front  Boluses and mIVF as needed depending on intake  UOP appears ok  Trend CPK, imrpving  Maintain euvolemia  Likely quite distributive and vasodilated with the hypothermia and subsequent rewarming

## 2024-12-12 NOTE — ASSESSMENT & PLAN NOTE
Most likely due to hypovolemia and distributive shock from hypothermia and associated vasoplegia.   Has been volume responsive, reveived 4L so far, another 500cc bolus ordered  Levophed to be titrated for goal MAP >65  IV thiamine given and on empiric abx with Ceftriaxone and Vancomycin  Following cultures

## 2024-12-12 NOTE — ED PROVIDER NOTES
ER Provider Note    Scribed for Kiarra Mayberry M.D. by Josefina Goddard. 12/12/2024   3:31 AM    Primary Care Provider: Luci Britt M.D.    CHIEF COMPLAINT  Chief Complaint   Patient presents with    Head Injury    ALOC       HPI/ROS  LIMITATION TO HISTORY   Select: Altered mental status / Confusion  OUTSIDE HISTORIAN(S):  EMS arrived with the patient and provided the history of the patient.     Jamie Pacheco is a 79 y.o. male who presents to the ED via EMS for evaluation of a head injury onset prior to arrival. EMS reports the patient has a history of dementia and the family has been taking care of him. They report the family states he had soiled himself and family was attempting to shower him when he became aggressive and combatative. The patient refused for hours to get out of the shower. They add he the tried to choke himself with the shower curtain and smashed his head against the plexiglass shower door which shattered. His family notes his aggression has increased over the last few weeks. The patient arrived GCS 10.     Initial history obtained from son, and daughter-in-law.  Patient has been gradually declining, known history of dementia, frequently confused, was unwilling to get out of the shower for hours, return of the water, ultimately had to call EMS to help him.  The son is very upset, states he just left his mother to the same condition.  When asked regarding goals of care, essentially states continue to do everything, cannot talk about it right now.  Wife states they have been discussing whether the patient should move to a more palliative type of care but if not ultimately made decision.    PAST MEDICAL HISTORY  Past Medical History:   Diagnosis Date    Anesthesia     2002-Wife reported pt flatlined after bronch. NO FURTHER PROBLEMS WITH FOLLOWING SURGERIES.     Anxiety     Arm pain, left 01/07/2014    Arthritis     general    Asthma     Inhalers as needed    Bilateral impacted  "cerumen 12/02/2019    Breath shortness 04/21/2017    States only with exess exertion. No changes to status.    CAD (coronary artery disease)     cad with an inconclusive thallium in 2004,  treated with medical management    Cancer (HCC) 2002, 2006    Lung (2002)-surgery and radiation. Prostate (2006)-brachytherapy    Carcinoma in situ of respiratory system 1998    Upper lung lobectomy and radiation therapy    Cataract 2007    Bilateral phaco with IOL    Colon polyps     NONCANCEROUS COLONIC POLYS 2004,MOST RECENT COLONOSCOPY 2005 WAS NEGATIVE    Coronary atherosclerosis     CONTROLLED    Depression     Diabetes (HCC)     Diverticulitis     TREATED WITH HEMICOLECTOMY    EKG abnormal 2000    ABN EKG W/OLD INFERIOR MI    Emphysema of lung (HCC)     COPD    Essential hypertension, benign 06/28/2013    Essential hypertension, malignant     CONTROLLED    Exposure     ORGANIC SOLVENT EXPOSURE IN THE 1980'S BUT HEAVY DOSES OF TYLENE AND XYLENE    Hand pain, right 05/14/2010    Heart attack (HCC)     High cholesterol     Lung cancer (HCC)     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, LEFT UPPER LOBE LOBECTOMY AND 6 WEEKS OF RADIATION IN 2001    Major depressive disorder 06/12/2018    Marital problems 12/08/2016    Mixed hyperlipidemia     CONTROLLED    Nephrolithiasis 1967, 1968    Osteoarthrosis involving, or with mention of more than one site, but not specified as generalized, multiple sites     CONTROLLED    Pain 04/21/2017    \"all over\"    Personal history of malignant neoplasm of bronchus and lung     STABLE    Personal history of malignant neoplasm of prostate     STABLE    Pneumonia 2015    Also had previously    Prediabetes 01/07/2014    Prostate cancer (HCC) 2006    TREATED WITH BRACHY THERAPY    Skin lesion 09/14/2019    Spinal fusion     C4-6, SPINAL FUSION PER DR JAY    Tachyarrhythmia 12/12/2024    Unspecified vitamin D deficiency 05/14/2010    Upper back pain on right side 09/14/2019 "       SURGICAL HISTORY  Past Surgical History:   Procedure Laterality Date    KNEE ARTHROPLASTY TOTAL Left 5/5/2017    Procedure: KNEE ARTHROPLASTY TOTAL;  Surgeon: Ermias Griffith M.D.;  Location: SURGERY [facility];  Service:     KNEE ARTHROSCOPY Right 2009    meniscus tear    TRIGGER FINGER RELEASE Right 2009    index finger    BICEPS TENDON REPAIR Right 2008    CERVICAL DISK AND FUSION ANTERIOR  2008    C4-C5    CATARACT PHACO WITH IOL Bilateral 2007    BRACHY THERAPY  2006    Prostate CA    KNEE ARTHROSCOPY Left 2003    BRONCHOSCOPY  2002    Cancer    LOBECTOMY Left 2002    Upper lung bronchial tube for CA    SIGMOID COLECTOMY  2001    secondary to ruptured diverticulitis    DENTAL EXTRACTION(S)  1969    wisdom teeth    COLONOSCOPY  2001-present    every 3 years    HERNIA REPAIR  2002, 2003    Abdominal incisional hernia, 2003 with mesh    TONSILLECTOMY  as child       FAMILY HISTORY  Family History   Adopted: Yes   Problem Relation Age of Onset    Diabetes Neg Hx        SOCIAL HISTORY   reports that he quit smoking about 23 years ago. His smoking use included cigarettes. He started smoking about 61 years ago. He has a 114 pack-year smoking history. He has never used smokeless tobacco. He reports current alcohol use. He reports that he does not use drugs.    CURRENT MEDICATIONS  Previous Medications    ALBUTEROL (PROVENTIL) 2.5MG/3ML NEBU SOLN SOLUTION FOR NEBULIZATION    3 mL by Nebulization route every four hours as needed for Shortness of Breath.    ALBUTEROL 108 (90 BASE) MCG/ACT AERO SOLN INHALATION AEROSOL    Inhale 2 Puffs every 6 hours as needed for Shortness of Breath.    CETIRIZINE (ZYRTEC) 10 MG TAB    Take 10 mg by mouth every day.    CYANOCOBALAMIN ([employer] B-12) 1000 MCG/15ML LIQUID    Take 1,000 mcg by mouth every day.    DONEPEZIL (ARICEPT) 10 MG TABLET    Take 5 mg in am x 1 week then 10 mg in am    MULTIVITAMIN TAB    Take 1 Tablet by mouth every day.    PYRIDOXINE HCL (B-6) 50 MG TAB     "Take 50 mg by mouth.    VITAMIN D3 (CHOLECALCIFEROL) 1000 UNIT (25 MCG) TAB    Take 2,000 Units by mouth every day.       ALLERGIES  Allergies   Allergen Reactions    Ampicillin      CAUSED A CHILDHOOD REACTION    Clindamycin Diarrhea    Demerol      CAUSES ECCHYMOSIS AND IS INEFFECTIVE        PHYSICAL EXAM  /86   Pulse (!) 107   Temp (!) 30 °C (86 °F) (Rectal)   Resp 14   Ht 1.702 m (5' 7\")   Wt 54.4 kg (120 lb)   SpO2 90%   BMI 18.79 kg/m²    General: Chronically ill appearing, mute, unable to orientation., cold to touch GCS E4V1M5 (10)  Head: Normocephalic atraumatic, skin tear over the scalp  Eyes: Extraocular motion intact 3 mm to 2 and reactive.  Neck:   Cardiovascular: Irregularly irregular rate and rhythm.  Bedside ultrasound reveals normal EF, trace pericardial effusion, no obvious right heart strain.   Respiratory: , equal chest rise and fall, no increased work of breathing, crackle in the right lower lobe otherwise clear to auscultation.  Abdomen: Soft nontender no guarding  Musculoskeletal: Cool to the touch, palpable pulses in distal extremities  Neuro: Spontaneously moving all 4 extremities to noxious stimuli.  Integumentary: No rashes, ecchymosis over the body bilaterally with skin tears    DIAGNOSTIC STUDIES    Labs:   Labs Reviewed   CBC WITH DIFFERENTIAL - Abnormal; Notable for the following components:       Result Value    WBC 20.8 (*)     RBC 3.97 (*)     Hemoglobin 10.6 (*)     Hematocrit 32.8 (*)     MCH 26.7 (*)     Platelet Count 603 (*)     MPV 8.6 (*)     Neutrophils-Polys 88.80 (*)     Lymphocytes 6.90 (*)     Neutrophils (Absolute) 18.66 (*)     All other components within normal limits   COMP METABOLIC PANEL - Abnormal; Notable for the following components:    Co2 18 (*)     Anion Gap 18.0 (*)     Bun 28 (*)     AST(SGOT) 51 (*)     All other components within normal limits   MAGNESIUM - Abnormal; Notable for the following components:    Magnesium 2.7 (*)     All other " components within normal limits   LACTIC ACID - Abnormal; Notable for the following components:    Lactic Acid 4.9 (*)     All other components within normal limits   ESTIMATED GFR - Abnormal; Notable for the following components:    GFR (CKD-EPI) 56 (*)     All other components within normal limits   CREATINE KINASE - Abnormal; Notable for the following components:    CPK Total 1331 (*)     All other components within normal limits   BLOOD CULTURE   BLOOD CULTURE   TSH WITH REFLEX TO FT4   DIFFERENTIAL MANUAL   PERIPHERAL SMEAR REVIEW   PLATELET ESTIMATE   MORPHOLOGY   PROTHROMBIN TIME   APTT   FREE THYROXINE   URINALYSIS   TROPONIN   MRSA BY PCR (AMP)   LACTIC ACID   LACTIC ACID   CORTISOL   URINE CULTURE(NEW)   COV-2, FLU A/B, AND RSV BY PCR (CEPHEID)       EKG:   I have independently interpreted this EKG as detailed above.  Results for orders placed or performed during the hospital encounter of 24   EKG   Result Value Ref Range    Report       [facility] Emergency Dept.    Test Date:  2024  Pt Name:    OSMANY RAMOS                Department: ER  MRN:        [id number]                      Room:       [facility]  Gender:     Male                         Technician: [id number]  :        1945                   Requested By:EUSEBIO ERIC  Order #:    [id number]                    Reading MD: Eusebio Eric    Measurements  Intervals                                Axis  Rate:       97                           P:          0  ID:         0                            QRS:        68  QRSD:       182                          T:          -84  QT:         447  QTc:        568    Interpretive Statements  Initial EKG showed artifact with slow misinterpretation, Noriega waves,  likely sinus rhythm with first-degree AV block.  No ST elevation obvious  given limitation of severe artifact  Electronically Signed On 2024 06:38:17 PST by Eusebio Eric     EKG   Result Value Ref Range    Report        [facility] Emergency Dept.    Test Date:  2024  Pt Name:    OSMANY RAMOS                Department: ER  MRN:        [id number]                      Room:       [facility]  Gender:     Male                         Technician: [id number]  :        1945                   Requested By:EUSEBIO ERIC  Order #:    [id number]                    Reading MD: Eusebio Eric    Measurements  Intervals                                Axis  Rate:       104                          P:          0  GA:         0                            QRS:        68  QRSD:       163                          T:          266  QT:         390  QTc:        513    Interpretive Statements    EKG showing A-fib, RVR, ST depressions in the anterior leads, septal leads.  No obvious reciprocal changes however limited interpretation due to severe  artifact.  Electronically Signed On 2024 06:39:15 PST by Eusebio Eric            Radiology:     Radiologist interpretation:   DX-CHEST-PORTABLE (1 VIEW)   Final Result         1.  No acute cardiopulmonary disease.   2.  Atherosclerosis      CT-CSPINE WITHOUT PLUS RECONS   Final Result         1.  Multilevel degenerative changes of the cervical spine limit diagnostic sensitivity of this examination, otherwise no acute traumatic bony injury of the cervical spine is apparent.      CT-HEAD W/O   Final Result         1.  No acute intracranial abnormality is identified, there are nonspecific white matter changes, commonly associated with small vessel ischemic disease.  Associated mild cerebral atrophy is noted.   2.  Focus of air superior to the left globe anteriorly, could be within vascular structure related to intravenous access, otherwise of uncertain significance.   3.  Atherosclerosis.               EC-ECHOCARDIOGRAM COMPLETE W/O CONT    (Results Pending)      Point of Care Ultrasound    Indication: centrally administered medications    Consent: Unable to be obtained due to  patient's condition.    Procedure: The patient was positioned appropriately and the skin over the right femoral vein was prepped with betadine and draped in a sterile fashion. Local anesthesia was obtained by infiltration using 1% Lidocaine without epinephrine.  A large bore needle was used to identify the vein under dynamic Ultrasound guidance.  A guide wire was then inserted into the vein through the needle. An 8.5 [place] introducer was then inserted into the vessel over the guide wire using the Seldinger technique.  All ports showed good, free flowing blood return and were flushed with saline solution.  The catheter was then securely fastened to the skin with sutures and covered with a sterile dressing.  A post procedure X-ray was not indicated.    *Bedside Ultrasound utilized for Limited Diagnostic Exam: Venipuncture requiring physician skill with ultrasound guidance and image retained as below.    The patient tolerated the procedure well.    Complications: patient has two femoral arteries, one was punctured with an angiocath, immediately identified bright pulsatile blood, was removed and direct pressure applied for 5 minutes without further incident. No dilation of the vessel was performed. 1+ pulse appreciated DP.     Image retained through [facility] as seen below:             This study is a limited ultrasound examination performed and interpreted to evaluate for limited conditions as outlined above. There may be other clinically important information contained in the images that is outside this scope. When clinically warranted, a comprehensive ultrasound through the appropriate department is considered.      INITIAL ASSESSMENT COURSE AND PLAN    Care Narrative     3:31 AM - Patient was evaluated at bedside. They present for evaluation of head trauma, altered mental status in setting of dementia, agitation, fall. . Pertinent PE findings include: GCS10 Mute, cold.  Nonfocal neuro.  Vital signs initially  stable with exception of severe hypothermia, started on brittany hugger emergently after CT imaging.      Given setting of patient being in a cold shower for several hours, I suspect hypothermia secondary to exposure also considered hypothyroidism, infection, adrenal insufficiency.            4:30- Patient was reevaluated at bedside. Patient's blood pressure dropped. While in the emergency department and being warmed, patient became hypotensive 70/50, subsequently went into tachyarrhythmia, on repeat EKG concerning for A-fib RVR.  In addition, patient developed deep ST depressions in the anterior leads.    Patient initially was fluid responsive, tachyarrhythmia resolved without initial cardiac intervention while discussing goals of care with family.  Gave a second liter of IV fluids.  Started amiodarone after discussion with Dr. Conteh, regarding ST depressions and A-fib RVR.  Regarding ST changes she recommends supportive care for now.    5:04 AM- Patient was reevaluated at bedside.     5:16 AM I discussed the patient's case and the above findings with Dr. Conteh  (Cardiology). We discussed supportive care, T wave depression and A fib RVR.     5:39 AM I discussed the patient's case and the above findings with Dr. Salazar (Intensivist) who agrees to see the patient.    5:44 AM- Central line placed in patient  Given persistent hypotension, no longer fluid responsive.  Ordered antibiotics.  Persistent hypotension may be due to vasodilation in the setting of warming however likely multifactorial, infection is not excluded, part of it may be rate dependent although rate is greatly improved with positioning and amiodarone.    Sepsis: Infection was suspected 0455 (Time). Sepsis pathway was initiated. >30cc/kg bolus given. Antibiotics were given.    5:48 AM I discussed the patient's case and the above findings with Dr. Greene (Intensivist) who agrees to admit the patient.     CRITICAL CARE  The very real possibilty of a  deterioration of this patient's condition required the highest level of my preparedness for sudden, emergent intervention.  I provided critical care services, which included medication orders, frequent reevaluations of the patient's condition and response to treatment, ordering and reviewing test results, and discussing the case with various consultants.  The critical care time associated with the care of the patient was 45 minutes. Review chart for interventions. This time is exclusive of any other billable procedures.       Hydration: Based on the patient's presentation of Dehydration the patient was given IV fluids. IV Hydration was used because oral hydration was not adequate alone. Upon recheck following hydration, the patient was improved .      DISPOSITION AND DISCUSSIONS    I have discussed management of the patient with the following physicians and MIA's:  Dr. Conteh (Cardiology), Dr. Salazar (Intensivist)     Discussion of management with other [facility] or appropriate source(s): None       Barriers to care at this time, including but not limited to:  None noted  .     Decision tools and prescription drugs considered including, but not limited to: Antibiotics Van/CTX .    DISPOSITION:  Patient will be hospitalized by Dr. Salazar in guarded condition.    FINAL DIAGNOSIS  1. Atrial fibrillation with RVR (HCC)    2. Hypothermia, initial encounter    3. Dementia with agitation, unspecified dementia severity, unspecified dementia type (HCC)    4. Altered mental status, unspecified altered mental status type    5. Critical Care  6. Shock (HCC)     Josefina CELIS (Silvia), am scribing for, and in the presence of, Eusebio Mayberry M.D..    Electronically signed by: Josefina Goddard (Silvia), 12/12/2024    Eusebio CELIS M.D. personally performed the services described in this documentation, as scribed by Josefina Goddard in my presence, and it is both accurate and complete.      The note accurately  reflects work and decisions made by me.  Eusebio Mayberry M.D.  12/12/2024  7:00 AM

## 2024-12-12 NOTE — ASSESSMENT & PLAN NOTE
Reported AF RVR in ED, was given a dose of IV Amiodarne 150mg. One of the EKGs has absent p waves and could be Afib, transient ST depressions in the anterior leads noted that resolved on repeat EKG. TSH normal.   Monitor on tele for recurrence  Holding anticoagulation at this time due to recurrent falls and head injuries, will need to discuss with family before starting.

## 2024-12-12 NOTE — ASSESSMENT & PLAN NOTE
Found in the shower    External warming measures  Volume resus  Trend acid base status  Correct electrolyte abnormalities

## 2024-12-12 NOTE — ED NOTES
0530- Erp ordered to do central line, prepared materials at bedside  0535- SOFT RESTRAINT started   0531- Started [id number] IV   0557- central line inserted by Erp at right femoral

## 2024-12-12 NOTE — PROGRESS NOTES
4 Eyes Skin Assessment Completed by HAROLDO Juarez and HAROLDO Meyers.    Head Scab, Bruising, Scratch, and Redness                      Ears Redness and Blanching  Nose Redness and Non-Blanching  Mouth WDL  Neck WDL  Breast/Chest Redness, Abrasion, Scab, and Scar  Shoulder Blades Redness and Blanching  Spine Redness and Blanching  (R) Arm/Elbow/Hand Redness, Blanching, Bruising, Abrasion, and Scab              (L) Arm/Elbow/Hand Redness, Blanching, Bruising, Abrasion, and Scab          Abdomen Scab and Abrasion  Groin WDL  Scrotum/Coccyx/Buttocks Redness and Blanching    (R) Leg Redness, Blanching, Scab, and Bruising    (L) Leg Redness and Non-Blanching    L hip redness    (R) Heel/Foot/Toe Redness, Blanching, and Boggy    (L) Heel/Foot/Toe Redness, Non-Blanching, and Boggy                      Devices In Places ECG, Blood Pressure Cuff, Pulse Ox, Schmidt, and Central Line      Interventions In Place NC W/Ear Foams, Sacral Mepilex, TAP System, Pillows, Q2 Turns, Low Air Loss Mattress, and Barrier Cream    Possible Skin Injury Yes    Pictures Uploaded Into Epic Yes  Wound Consult Placed Yes  RN Wound Prevention Protocol Ordered Yes

## 2024-12-12 NOTE — ASSESSMENT & PLAN NOTE
Could be related to the episode of fall and possibly prolonged exposure to cold water in the shower prior to hospitalization.   86F at presentation -> normothermic now with external warming measures  S/P 4L IVF since admission, will monitor for additional IVF  Levo titrate for goal MAP >65  Monitoring electrolytes closely

## 2024-12-12 NOTE — ED NOTES
Received pt from Tiarra ZARCO    Placed on bed alarm, attached to monitor    Applied bear zinagger    Noted fresh multiple cut wounds on pt's body, no active bleeding noted

## 2024-12-12 NOTE — PROGRESS NOTES
Pharmacy Medication Reconciliation      ~Medication reconciliation updated and complete per patient son over the phone   ~No oral ABX within the last 30 days  ~Patient home pharmacy :  [facility]      ~Anticoagulants (rivaroxaban, apixaban, edoxaban, dabigatran, warfarin, enoxaparin) taken in the last 14 days? No

## 2024-12-12 NOTE — ED NOTES
Ann at bedside    Informed Ann ZARCO pt's BP dropped again  to 54/21 and Dr Martinez verbally ordered to give another bolus of LR     LR started IV bolus    Pt transported to T626 by HAROLDO Juarez

## 2024-12-12 NOTE — PROGRESS NOTES
80 yo with severe dementia and hypothermia with afib RVR, hypotension and poor quality ECG with anterolateral ST depression which could reflect ischemia, not candidate for LHC at this time. Supportive care. Amio for afib. Formal cardiology consult to follow.

## 2024-12-12 NOTE — ASSESSMENT & PLAN NOTE
Noted on MPI, history not entirely clear as of yet  Not on AT therapy  Initial EKG with some ischemic appearing ST depressions but difficult to discern from AF with some NSST  No evidence of cardiogenic shock or WMA or depressed fxn on bedside sono    TTE non diagnostic, preserved fxn  Trop flat  EKG PRN

## 2024-12-13 PROBLEM — F03.90 DEMENTIA (HCC): Status: ACTIVE | Noted: 2024-12-13

## 2024-12-13 PROBLEM — R57.9 SHOCK (HCC): Status: ACTIVE | Noted: 2024-12-13

## 2024-12-13 PROBLEM — I95.9 HYPOTENSION: Status: RESOLVED | Noted: 2024-12-12 | Resolved: 2024-12-13

## 2024-12-13 LAB
ADV 40+41 DNA STL QL NAA+NON-PROBE: NOT DETECTED
ALBUMIN SERPL BCP-MCNC: 2.7 G/DL (ref 3.2–4.9)
ALBUMIN/GLOB SERPL: 1 G/DL
ALP SERPL-CCNC: 60 U/L (ref 30–99)
ALT SERPL-CCNC: 34 U/L (ref 2–50)
ANION GAP SERPL CALC-SCNC: 11 MMOL/L (ref 7–16)
AST SERPL-CCNC: 70 U/L (ref 12–45)
ASTRO TYP 1-8 RNA STL QL NAA+NON-PROBE: NOT DETECTED
BASOPHILS # BLD AUTO: 0.1 % (ref 0–1.8)
BASOPHILS # BLD: 0.01 K/UL (ref 0–0.12)
BILIRUB SERPL-MCNC: 0.3 MG/DL (ref 0.1–1.5)
BUN SERPL-MCNC: 28 MG/DL (ref 8–22)
C CAYETANENSIS DNA STL QL NAA+NON-PROBE: NOT DETECTED
C COLI+JEJ+UPSA DNA STL QL NAA+NON-PROBE: NOT DETECTED
CALCIUM ALBUM COR SERPL-MCNC: 9.5 MG/DL (ref 8.5–10.5)
CALCIUM SERPL-MCNC: 8.5 MG/DL (ref 8.5–10.5)
CHLORIDE SERPL-SCNC: 111 MMOL/L (ref 96–112)
CK SERPL-CCNC: 1403 U/L (ref 0–154)
CO2 SERPL-SCNC: 19 MMOL/L (ref 20–33)
CREAT SERPL-MCNC: 1.12 MG/DL (ref 0.5–1.4)
CRYPTOSP DNA STL QL NAA+NON-PROBE: NOT DETECTED
E HISTOLYT DNA STL QL NAA+NON-PROBE: NOT DETECTED
EAEC PAA PLAS AGGR+AATA ST NAA+NON-PRB: NOT DETECTED
EC STX1+STX2 GENES STL QL NAA+NON-PROBE: NOT DETECTED
EOSINOPHIL # BLD AUTO: 0.01 K/UL (ref 0–0.51)
EOSINOPHIL NFR BLD: 0.1 % (ref 0–6.9)
EPEC EAE GENE STL QL NAA+NON-PROBE: NOT DETECTED
ERYTHROCYTE [DISTWIDTH] IN BLOOD BY AUTOMATED COUNT: 41.3 FL (ref 35.9–50)
ETEC LTA+ST1A+ST1B TOX ST NAA+NON-PROBE: NOT DETECTED
G LAMBLIA DNA STL QL NAA+NON-PROBE: NOT DETECTED
GFR SERPLBLD CREATININE-BSD FMLA CKD-EPI: 68 ML/MIN/1.73 M 2
GLOBULIN SER CALC-MCNC: 2.6 G/DL (ref 1.9–3.5)
GLUCOSE SERPL-MCNC: 96 MG/DL (ref 65–99)
HCT VFR BLD AUTO: 25.6 % (ref 42–52)
HGB BLD-MCNC: 8.3 G/DL (ref 14–18)
IMM GRANULOCYTES # BLD AUTO: 0.06 K/UL (ref 0–0.11)
IMM GRANULOCYTES NFR BLD AUTO: 0.5 % (ref 0–0.9)
LYMPHOCYTES # BLD AUTO: 1.57 K/UL (ref 1–4.8)
LYMPHOCYTES NFR BLD: 13.1 % (ref 22–41)
MAGNESIUM SERPL-MCNC: 2.1 MG/DL (ref 1.5–2.5)
MCH RBC QN AUTO: 26.8 PG (ref 27–33)
MCHC RBC AUTO-ENTMCNC: 32.4 G/DL (ref 32.3–36.5)
MCV RBC AUTO: 82.6 FL (ref 81.4–97.8)
MONOCYTES # BLD AUTO: 0.49 K/UL (ref 0–0.85)
MONOCYTES NFR BLD AUTO: 4.1 % (ref 0–13.4)
NEUTROPHILS # BLD AUTO: 9.83 K/UL (ref 1.82–7.42)
NEUTROPHILS NFR BLD: 82.1 % (ref 44–72)
NOROVIRUS GI+II RNA STL QL NAA+NON-PROBE: NOT DETECTED
NRBC # BLD AUTO: 0 K/UL
NRBC BLD-RTO: 0 /100 WBC (ref 0–0.2)
P SHIGELLOIDES DNA STL QL NAA+NON-PROBE: NOT DETECTED
PHOSPHATE SERPL-MCNC: 3.2 MG/DL (ref 2.5–4.5)
PLATELET # BLD AUTO: 365 K/UL (ref 164–446)
PMV BLD AUTO: 9.2 FL (ref 9–12.9)
POTASSIUM SERPL-SCNC: 3.9 MMOL/L (ref 3.6–5.5)
PROT SERPL-MCNC: 5.3 G/DL (ref 6–8.2)
RBC # BLD AUTO: 3.1 M/UL (ref 4.7–6.1)
RVA RNA STL QL NAA+NON-PROBE: NOT DETECTED
S ENT+BONG DNA STL QL NAA+NON-PROBE: NOT DETECTED
SAPO I+II+IV+V RNA STL QL NAA+NON-PROBE: NOT DETECTED
SHIGELLA SP+EIEC IPAH ST NAA+NON-PROBE: NOT DETECTED
SODIUM SERPL-SCNC: 141 MMOL/L (ref 135–145)
V CHOL+PARA+VUL DNA STL QL NAA+NON-PROBE: NOT DETECTED
V CHOLERAE DNA STL QL NAA+NON-PROBE: NOT DETECTED
VANCOMYCIN SERPL-MCNC: 7.3 UG/ML
WBC # BLD AUTO: 12 K/UL (ref 4.8–10.8)
Y ENTEROCOL DNA STL QL NAA+NON-PROBE: NOT DETECTED

## 2024-12-13 PROCEDURE — 82550 ASSAY OF CK (CPK): CPT

## 2024-12-13 PROCEDURE — 700102 HCHG RX REV CODE 250 W/ 637 OVERRIDE(OP): Performed by: EMERGENCY MEDICINE

## 2024-12-13 PROCEDURE — 84100 ASSAY OF PHOSPHORUS: CPT

## 2024-12-13 PROCEDURE — 83735 ASSAY OF MAGNESIUM: CPT

## 2024-12-13 PROCEDURE — 36415 COLL VENOUS BLD VENIPUNCTURE: CPT

## 2024-12-13 PROCEDURE — 700102 HCHG RX REV CODE 250 W/ 637 OVERRIDE(OP)

## 2024-12-13 PROCEDURE — 700105 HCHG RX REV CODE 258

## 2024-12-13 PROCEDURE — 770000 HCHG ROOM/CARE - INTERMEDIATE ICU *

## 2024-12-13 PROCEDURE — 306565 RIGID MIT RESTRAINT(PAIR)

## 2024-12-13 PROCEDURE — 80202 ASSAY OF VANCOMYCIN: CPT

## 2024-12-13 PROCEDURE — 85025 COMPLETE CBC W/AUTO DIFF WBC: CPT

## 2024-12-13 PROCEDURE — 700111 HCHG RX REV CODE 636 W/ 250 OVERRIDE (IP)

## 2024-12-13 PROCEDURE — 99233 SBSQ HOSP IP/OBS HIGH 50: CPT | Performed by: EMERGENCY MEDICINE

## 2024-12-13 PROCEDURE — 92610 EVALUATE SWALLOWING FUNCTION: CPT

## 2024-12-13 PROCEDURE — A9270 NON-COVERED ITEM OR SERVICE: HCPCS | Performed by: EMERGENCY MEDICINE

## 2024-12-13 PROCEDURE — 87507 IADNA-DNA/RNA PROBE TQ 12-25: CPT

## 2024-12-13 PROCEDURE — 51798 US URINE CAPACITY MEASURE: CPT

## 2024-12-13 PROCEDURE — 80053 COMPREHEN METABOLIC PANEL: CPT

## 2024-12-13 PROCEDURE — A9270 NON-COVERED ITEM OR SERVICE: HCPCS

## 2024-12-13 PROCEDURE — 700111 HCHG RX REV CODE 636 W/ 250 OVERRIDE (IP): Performed by: EMERGENCY MEDICINE

## 2024-12-13 PROCEDURE — 99223 1ST HOSP IP/OBS HIGH 75: CPT | Performed by: HOSPITALIST

## 2024-12-13 RX ORDER — ENOXAPARIN SODIUM 100 MG/ML
40 INJECTION SUBCUTANEOUS DAILY
Status: DISCONTINUED | OUTPATIENT
Start: 2024-12-13 | End: 2024-12-14

## 2024-12-13 RX ORDER — CALCIUM GLUCONATE 20 MG/ML
2 INJECTION, SOLUTION INTRAVENOUS ONCE
Status: DISCONTINUED | OUTPATIENT
Start: 2024-12-13 | End: 2024-12-13

## 2024-12-13 RX ORDER — DONEPEZIL HYDROCHLORIDE 5 MG/1
10 TABLET, FILM COATED ORAL NIGHTLY
Status: DISCONTINUED | OUTPATIENT
Start: 2024-12-13 | End: 2024-12-15 | Stop reason: HOSPADM

## 2024-12-13 RX ORDER — QUETIAPINE FUMARATE 25 MG/1
50 TABLET, FILM COATED ORAL NIGHTLY
Status: DISCONTINUED | OUTPATIENT
Start: 2024-12-13 | End: 2024-12-14

## 2024-12-13 RX ADMIN — SENNOSIDES AND DOCUSATE SODIUM 2 TABLET: 50; 8.6 TABLET ORAL at 16:45

## 2024-12-13 RX ADMIN — CEFTRIAXONE SODIUM 2000 MG: 10 INJECTION, POWDER, FOR SOLUTION INTRAVENOUS at 05:33

## 2024-12-13 RX ADMIN — ENOXAPARIN SODIUM 40 MG: 100 INJECTION SUBCUTANEOUS at 16:44

## 2024-12-13 RX ADMIN — QUETIAPINE FUMARATE 50 MG: 25 TABLET ORAL at 21:31

## 2024-12-13 RX ADMIN — DONEPEZIL HYDROCHLORIDE 10 MG: 5 TABLET, FILM COATED ORAL at 21:31

## 2024-12-13 RX ADMIN — THIAMINE HYDROCHLORIDE 200 MG: 100 INJECTION, SOLUTION INTRAMUSCULAR; INTRAVENOUS at 05:33

## 2024-12-13 RX ADMIN — THIAMINE HYDROCHLORIDE 200 MG: 100 INJECTION, SOLUTION INTRAMUSCULAR; INTRAVENOUS at 16:45

## 2024-12-13 ASSESSMENT — PAIN SCALES - PAIN ASSESSMENT IN ADVANCED DEMENTIA (PAINAD)
NEGVOCALIZATION: OCCASIONAL MOAN/GROAN, LOW SPEECH, NEGATIVE/DISAPPROVING QUALITY
CONSOLABILITY: NO NEED TO CONSOLE
FACIALEXPRESSION: SMILING OR INEXPRESSIVE
BODYLANGUAGE: RELAXED
CONSOLABILITY: NO NEED TO CONSOLE
FACIALEXPRESSION: SMILING OR INEXPRESSIVE
TOTALSCORE: 3
BODYLANGUAGE: RELAXED
BODYLANGUAGE: RELAXED
TOTALSCORE: 0
TOTALSCORE: 0
BODYLANGUAGE: RELAXED
FACIALEXPRESSION: SMILING OR INEXPRESSIVE
BODYLANGUAGE: RELAXED
BREATHING: NORMAL
CONSOLABILITY: NO NEED TO CONSOLE
FACIALEXPRESSION: SMILING OR INEXPRESSIVE
BREATHING: NORMAL
CONSOLABILITY: NO NEED TO CONSOLE
BODYLANGUAGE: RELAXED
CONSOLABILITY: NO NEED TO CONSOLE
BREATHING: NORMAL
CONSOLABILITY: NO NEED TO CONSOLE
TOTALSCORE: 0
BREATHING: NORMAL
BODYLANGUAGE: RELAXED
FACIALEXPRESSION: SMILING OR INEXPRESSIVE
FACIALEXPRESSION: SAD, FRIGHTENED, FROWN
TOTALSCORE: 0
BREATHING: OCCASIONAL LABORED BREATHING, SHORT PERIOD OF HYPERVENTILATION
CONSOLABILITY: NO NEED TO CONSOLE
FACIALEXPRESSION: SMILING OR INEXPRESSIVE
TOTALSCORE: 0
TOTALSCORE: 0

## 2024-12-13 ASSESSMENT — PAIN DESCRIPTION - PAIN TYPE
TYPE: CHRONIC PAIN
TYPE: ACUTE PAIN

## 2024-12-13 ASSESSMENT — FIBROSIS 4 INDEX: FIB4 SCORE: 1.15

## 2024-12-13 NOTE — CARE PLAN
The patient is Watcher - Medium risk of patient condition declining or worsening    Shift Goals  Clinical Goals: MAP >65; SBP >90; neuro  Patient Goals: comfort, sleep  Family Goals: GRADY    Progress made toward(s) clinical / shift goals:    Problem: Safety - Medical Restraint  Goal: Remains free of injury from restraints (Restraint for Interference with Medical Device)  Outcome: Progressing  Goal: Free from restraint(s) (Restraint for Interference with Medical Device)  Outcome: Not Progressing     Problem: Knowledge Deficit - Standard  Goal: Patient and family/care givers will demonstrate understanding of plan of care, disease process/condition, diagnostic tests and medications  Outcome: Progressing     Problem: Hemodynamics  Goal: Patient's hemodynamics, fluid balance and neurologic status will be stable or improve  Outcome: Progressing     Problem: Pain - Standard  Goal: Alleviation of pain or a reduction in pain to the patient’s comfort goal  Outcome: Progressing       Patient is not progressing towards the following goals:      Problem: Safety - Medical Restraint  Goal: Free from restraint(s) (Restraint for Interference with Medical Device)  Outcome: Not Progressing

## 2024-12-13 NOTE — PROGRESS NOTES
Notified Rach Flores MD resident of pt's impulsivity and new agitation even after 2mg of haldol. Can we get something else?

## 2024-12-13 NOTE — PROGRESS NOTES
Notified Rach Flores MD resident of low urine output only 325ml for whole night shift 12 hrs; no response; 0615: notified Misty of low urine output for whole night and also notified dayshift resident

## 2024-12-13 NOTE — CONSULTS
Hospital Medicine Consultation    Date of Service  12/13/2024    Referring Physician  Juan Jay M.D.    Consulting Physician  Ryan Stark M.D.    Reason for Consultation  shock    History of Presenting Illness  79 y.o. male who presented 12/12/2024 with altered level of consciousness.  Mr. Pacheco has a past  medical history of coronary disease without intervention, lung cancer s/p resection, dementia followed by Dr. Noriega neurology that was brought to the ER after family noted that he had soiled himself and he became very aggressive and combative and in the shower apparently would not get out tried to choke himself in the shower curtain smashed his head against the plexiglass shower door which shattered.  Apparently he has been more aggressive lately.  Emergency room is found to have a white blood cell count of 20,000 with a metabolic acidosis bicarb of 18 and anion gap of 18 and lactic acid of 4.9.  Cultures were drawn he was empirically started on antibiotics.  An extensive trauma workup was negative.  Due to hypothermia with core temperature of 30 °C which was 86 °F he had active warming measures.  Was found to have rhabdomyolysis with a CPK of 1903 was treated with IV fluids.  He was hypotensive requiring IV Levophed drip which was tapered off.    Review of Systems  Review of Systems   Unable to perform ROS: Mental acuity       Past Medical History   has a past medical history of Anesthesia, Anxiety, Arm pain, left (01/07/2014), Arthritis, Asthma, Bilateral impacted cerumen (12/02/2019), Breath shortness (04/21/2017), CAD (coronary artery disease), Cancer (HCC) (2002, 2006), Carcinoma in situ of respiratory system (1998), Cataract (2007), Colon polyps, Coronary atherosclerosis, Depression, Diabetes (formerly [facility]), Diverticulitis, EKG abnormal (2000), Emphysema of lung (formerly [facility]), Essential hypertension, benign (06/28/2013), Essential hypertension, malignant, Exposure, Hand pain, right (05/14/2010), Heart attack  (HCC), High cholesterol, Lung cancer (HCC), Major depressive disorder (06/12/2018), Marital problems (12/08/2016), Mixed hyperlipidemia, Nephrolithiasis (1967, 1968), Osteoarthrosis involving, or with mention of more than one site, but not specified as generalized, multiple sites, Pain (04/21/2017), Personal history of malignant neoplasm of bronchus and lung, Personal history of malignant neoplasm of prostate, Pneumonia (2015), Prediabetes (01/07/2014), Prostate cancer (HCC) (2006), Skin lesion (09/14/2019), Spinal fusion, Tachyarrhythmia (12/12/2024), Unspecified vitamin D deficiency (05/14/2010), and Upper back pain on right side (09/14/2019).    He has no past medical history of Anemia, Clotting disorder ([facility]), Diabetic neuropathy ([facility]), GERD (gastroesophageal reflux disease), Glaucoma, Heart murmur, HIV (human immunodeficiency virus infection) ([facility]), Migraine, Seizure ([facility]), Stroke ([facility]), or Thyroid disease.    Surgical History   has a past surgical history that includes biceps tendon repair (Right, 2008); hernia repair (2002, 2003); knee arthroscopy (Right, 2009); sigmoid colectomy (2001); trigger finger release (Right, 2009); knee arthroscopy (Left, 2003); bronchoscopy (2002); lobectomy (Left, 2002); cervical disk and fusion anterior (2008); cataract phaco with iol (Bilateral, 2007); brachy therapy (2006); tonsillectomy (as child); dental extraction(s) (1969); colonoscopy (2001-present); and knee arthroplasty total (Left, 5/5/2017).    Family History  family history is not on file. He was adopted.    Social History   reports that he quit smoking about 23 years ago. His smoking use included cigarettes. He started smoking about 61 years ago. He has a 114 pack-year smoking history. He has never used smokeless tobacco. He reports current alcohol use. He reports that he does not use drugs.    Medications  Prior to Admission Medications   Prescriptions Last Dose Informant Patient Reported? Taking?   Cyanocobalamin  (VITAMIN B-12 PO) 12/10/2024 Morning Family Member Yes No   Sig: Take 8 Drops by mouth every morning.   MAGNESIUM-ZINC PO 12/10/2024 Morning Family Member Yes No   Sig: Take 1 Tablet by mouth every morning.   Multiple Vitamins-Minerals (ICAPS) Tab 12/10/2024 Morning Family Member Yes Yes   Sig: Take 1 Tablet by mouth every morning.   albuterol (PROVENTIL) 2.5mg/3ml Nebu Soln solution for nebulization Unknown Family Member No No   Sig: 3 mL by Nebulization route every four hours as needed for Shortness of Breath.   albuterol 108 (90 Base) MCG/ACT Aero Soln inhalation aerosol Unknown Family Member No No   Sig: Inhale 2 Puffs every 6 hours as needed for Shortness of Breath.   cetirizine (ZYRTEC) 10 MG Tab 12/10/2024 Morning Family Member Yes No   Sig: Take 10 mg by mouth every day.   donepezil (ARICEPT) 10 MG tablet 12/10/2024 Morning Family Member No No   Sig: Take 5 mg in am x 1 week then 10 mg in am   multivitamin Tab 12/10/2024 Morning Family Member Yes No   Sig: Take 1 Tablet by mouth every day.   vitamin D3 (CHOLECALCIFEROL) 1000 Unit (25 mcg) Tab 12/10/2024 Morning Family Member Yes No   Sig: Take 2,000 Units by mouth every day.      Facility-Administered Medications: None       Allergies  Allergies   Allergen Reactions    Ampicillin Unspecified     CAUSED A CHILDHOOD REACTION    Demerol Unspecified     CAUSES ECCHYMOSIS AND IS INEFFECTIVE    Clindamycin Diarrhea             Physical Exam  Temp:  [35.6 °C (96.1 °F)-37 °C (98.6 °F)] 37 °C (98.6 °F)  Pulse:  [] 50  Resp:  [3-65] 11  BP: ()/(50-86) 87/54  SpO2:  [89 %-100 %] 96 %    Physical Exam  Vitals and nursing note reviewed.   Constitutional:       General: He is not in acute distress.     Appearance: He is ill-appearing and toxic-appearing.   Cardiovascular:      Rate and Rhythm: Regular rhythm. Bradycardia present.   Pulmonary:      Effort: Pulmonary effort is normal.      Breath sounds: Normal breath sounds.   Abdominal:      Palpations:  Abdomen is soft.   Musculoskeletal:      Right lower leg: No edema.      Left lower leg: No edema.      Comments: Extensive bruises arms  Left forearm skin tears   Neurological:      Comments: Oriented to self         Fluids  Date 12/13/24 0700 - 12/14/24 0659   Shift 4122-6380 1331-1226 6557-7740 24 Hour Total   INTAKE   I.V. 305.6   305.6   IV Piggyback 0   0   Shift Total 305.6   305.6   OUTPUT   Urine 100   100   Shift Total 100   100   Weight (kg) 58 58 58 58       Laboratory  Recent Labs     12/12/24  0357 12/13/24  0552   WBC 20.8* 12.0*   RBC 3.97* 3.10*   HEMOGLOBIN 10.6* 8.3*   HEMATOCRIT 32.8* 25.6*   MCV 82.6 82.6   MCH 26.7* 26.8*   MCHC 32.3 32.4   RDW 41.9 41.3   PLATELETCT 603* 365   MPV 8.6* 9.2     Recent Labs     12/12/24  0357 12/12/24  1427 12/13/24  0552   SODIUM 142 142 141   POTASSIUM 3.9 3.7 3.9   CHLORIDE 106 111 111   CO2 18* 16* 19*   GLUCOSE 98 78 96   BUN 28* 31* 28*   CREATININE 1.32 1.22 1.12   CALCIUM 9.6 8.4* 8.5     Recent Labs     12/12/24  0410   APTT 25.8   INR 1.11                 Imaging  EC-ECHOCARDIOGRAM COMPLETE W/O CONT   Final Result      DX-CHEST-PORTABLE (1 VIEW)   Final Result         1.  No acute cardiopulmonary disease.   2.  Atherosclerosis      CT-CSPINE WITHOUT PLUS RECONS   Final Result         1.  Multilevel degenerative changes of the cervical spine limit diagnostic sensitivity of this examination, otherwise no acute traumatic bony injury of the cervical spine is apparent.      CT-HEAD W/O   Final Result         1.  No acute intracranial abnormality is identified, there are nonspecific white matter changes, commonly associated with small vessel ischemic disease.  Associated mild cerebral atrophy is noted.   2.  Focus of air superior to the left globe anteriorly, could be within vascular structure related to intravenous access, otherwise of uncertain significance.   3.  Atherosclerosis.                   Assessment/Plan  * Shock (HCC)- (present on  admission)  Assessment & Plan  Undifferentiated vaso distributive shock that does not appear to be sepsis  Cortisol level was robust at 44  IV Levophed drip which has been tapered off  IV fluids were given    Dementia (HCC)- (present on admission)  Assessment & Plan  Known history of for which she is followed by Dr. Noriega neurology  He has been on Aricept which will be restarted  Add Seroquel 50 mg nightly and consider adding morning dose if indicated  He was on a Precedex drip    Rhabdomyolysis- (present on admission)  Assessment & Plan  CPK 1900 emergency room for which he received IV fluids and CPK remains high at 1400  Further supportive care and check CPK in the morning as well as renal function and urine output    High anion gap metabolic acidosis- (present on admission)  Assessment & Plan  Resolved with IV fluids    Accidental hypothermia, initial encounter- (present on admission)  Assessment & Plan  Core temperature was 30 °C for which she had active warming    CAD (coronary artery disease)- (present on admission)  Assessment & Plan  History of details are unclear though    Personal history of malignant neoplasm of bronchus and lung- (present on admission)  Assessment & Plan  History of partial lung resection details are unclear

## 2024-12-13 NOTE — PROGRESS NOTES
"Critical Care Progress Note    Date of admission  12/12/2024    Chief Complaint  \"79 y.o. male who presented 12/12/2024 with past medical history of advanced Alzheimer's diagnosis with declining functional status over the last year to 2, history of prostate cancer status post radiation, also some reported remote history of lung cancer status post partial lobectomy in 1998 reported diabetes emphysema who presented to the emergency department by EMS after family found him in the bathroom, he had soiled himself and was reportedly in the shower and when they turned the water off he became very aggressive with them and combative and reportedly tried to choke himself with a shower curtain and then intentionally hit his head against the shower door.  The family states that this is not entirely out of sync with his increased episodic aggression over the last weeks to months but not entirely himself either.  Patient was brought to ED where he was noted to have marked agitation, and hemodynamic derangements including intermittent hypotension that was volume responsive, and some tacky arrhythmias which by 1 EKG certainly look like atrial fibrillation but he was very tremulous and very difficult to get a good tracing.  The patient was given 2 liter of IV fluids had a right femoral CVC placed and was placed on norepinephrine which was then quickly titrated off with the administration of crystalloid.  Of note the patient was markedly hypothermic at arrival with a core temperature of approximately 30 \" H+P    Hospital Course  12/12-Admit ICU, hypothermia (external warming),  hypotension, rhabdo, OPAL, titrating NE, dementia and agitation requiring multiple interventions, had AF RVR in ED given amioadarone bolus and gtt which was discontinued d/t bradycardia, multiple fluid boluses    12/13-HD stable, off vasoactives, 1L LR given bolus, some agitation overnight, start VTE ppx, passed swallow, diet started    Interval Problem " Update  Reviewed last 24 hour events:  Agitated overnight    Neuro:   Dex with some xenia overnight  Fent x1      CV:  HR 60-90  -120s  NE @ 0.09-->0.04      Resp:   1L NC    GI: BM x1    I/O: +5L  UOP: 700    Tmax: Normothermic  Heme: WBC 12    Abx:   (12/12-) V/CTX empirically  (12/13- ) CTX    Micro:  12/12- RVP negative  MRSA neg  12/12- UA nitrites neg, no pyuria  12/12- Bcxs negative    Endo: BG WTR    LDA: PIV, CVC, Schmidt  SUP: NI  VTE: Start enox  Diet: NPO, failed beside swallow      Review of Systems  Review of Systems   All other systems reviewed and are negative.       Vital Signs for last 24 hours   Temp:  [35.6 °C (96.1 °F)-37 °C (98.6 °F)] 36.7 °C (98.1 °F)  Pulse:  [] 60  Resp:  [3-65] 14  BP: ()/(50-86) 91/55  SpO2:  [89 %-100 %] 96 %    Hemodynamic parameters for last 24 hours       Respiratory Information for the last 24 hours       Physical Exam   Physical Exam  Constitutional:       Appearance: He is ill-appearing.   HENT:      Head: Normocephalic.      Mouth/Throat:      Mouth: Mucous membranes are dry.   Eyes:      Pupils: Pupils are equal, round, and reactive to light.   Cardiovascular:      Rate and Rhythm: Regular rhythm. Bradycardia present.      Pulses: Normal pulses.   Pulmonary:      Effort: No respiratory distress.      Breath sounds: No rales.   Abdominal:      General: Abdomen is flat.      Palpations: Abdomen is soft.   Musculoskeletal:      Cervical back: Neck supple.      Right lower leg: No edema.      Left lower leg: No edema.      Comments: Bruises n UEs diffuse, no focal deformities   Skin:     General: Skin is warm.      Capillary Refill: Capillary refill takes less than 2 seconds.      Comments: Multiple bruises of varying degrees of healing throughout dixie and on arms   Neurological:      Comments: Confused disoriented  Pretty calm for me this AM  Tells me his name  Speech is fluent  Isnt clear where he is but attempts to answer    RENAE  No focality          Medications  Current Facility-Administered Medications   Medication Dose Route Frequency Provider Last Rate Last Admin    donepezil (Aricept) tablet 10 mg  10 mg Oral Nightly Juan Jay M.D.        QUEtiapine (SEROquel) tablet 50 mg  50 mg Oral Nightly Juan Jay M.D.        enoxaparin (Lovenox) inj 40 mg  40 mg Subcutaneous DAILY AT 1800 Juan Jay M.D.        [START ON 12/14/2024] cefTRIAXone (Rocephin) syringe 1,000 mg  1,000 mg Intravenous Q24HRS Leonides Casillas M.D.        thiamine (B-1) injection 200 mg  200 mg Intravenous BID Juan Jay M.D.   200 mg at 12/13/24 0533    norepinephrine (Levophed) 8 mg in 250 mL NS infusion (premix)  0-1 mcg/kg/min Intravenous Continuous Dayna Bee M.D.   Stopped at 12/13/24 0535    And    vasopressin (Vasostrict) 20 units in  mL infusion  0.03 Units/min Intravenous Continuous Dayna Bee M.D.        senna-docusate (Pericolace Or Senokot S) 8.6-50 MG per tablet 2 Tablet  2 Tablet Oral Q EVENING Dayna Bee M.D.        And    polyethylene glycol/lytes (Miralax) Packet 1 Packet  1 Packet Oral QDAY PRN Dayna Bee M.D.        [Held by provider] amiodarone (Cordarone) tablet 200 mg  200 mg Oral DAILY Hayes Nash M.D.        dexmedetomidine (Precedex) 400 mcg/100mL infusion  0.1-1.5 mcg/kg/hr Intravenous Continuous Dayna Bee M.D.   Stopped at 12/12/24 2030    dextrose 50% (D50W) injection 25 g  25 g Intravenous Q15 MIN PRN Dayna Bee M.D.        dextrose 10% infusion   Intravenous Continuous Dayna Bee M.D. 50 mL/hr at 12/13/24 0700 Rate Verify at 12/13/24 0700       Fluids    Intake/Output Summary (Last 24 hours) at 12/13/2024 1529  Last data filed at 12/13/2024 1200  Gross per 24 hour   Intake 1661.92 ml   Output 570 ml   Net 1091.92 ml       Laboratory      Recent Labs     12/12/24  0357 12/12/24  0845 12/13/24  0552   CPKTOTAL 1331* 1903* 1403*     Recent Labs     12/12/24  0357 12/12/24  1427  12/13/24  0552   SODIUM 142 142 141   POTASSIUM 3.9 3.7 3.9   CHLORIDE 106 111 111   CO2 18* 16* 19*   BUN 28* 31* 28*   CREATININE 1.32 1.22 1.12   MAGNESIUM 2.7*  --  2.1   PHOSPHORUS  --   --  3.2   CALCIUM 9.6 8.4* 8.5     Recent Labs     12/12/24  0357 12/12/24  1427 12/13/24  0552   ALTSGPT 32  --  34   ASTSGOT 51*  --  70*   ALKPHOSPHAT 80  --  60   TBILIRUBIN 0.4  --  0.3   GLUCOSE 98 78 96     Recent Labs     12/12/24  0357 12/13/24  0552   WBC 20.8* 12.0*   NEUTSPOLYS 88.80* 82.10*   LYMPHOCYTES 6.90* 13.10*   MONOCYTES 0.80 4.10   EOSINOPHILS 0.00 0.10   BASOPHILS 0.00 0.10   ASTSGOT 51* 70*   ALTSGPT 32 34   ALKPHOSPHAT 80 60   TBILIRUBIN 0.4 0.3     Recent Labs     12/12/24  0357 12/12/24  0410 12/13/24  0552   RBC 3.97*  --  3.10*   HEMOGLOBIN 10.6*  --  8.3*   HEMATOCRIT 32.8*  --  25.6*   PLATELETCT 603*  --  365   PROTHROMBTM  --  14.3  --    APTT  --  25.8  --    INR  --  1.11  --        Imaging      Assessment/Plan  * Accidental hypothermia, initial encounter- (present on admission)  Assessment & Plan  Found in the shower    External warming measures  Volume resus  Trend acid base status  Correct electrolyte abnormalities      Rhabdomyolysis  Assessment & Plan  Initial CPK in the 3k range, likely due to injury, hypothermia and attendant shivering    Aggressive volume resus up front  Boluses and mIVF as needed depending on intake  UOP appears ok  Trend CPK, imrpving  Maintain euvolemia  Likely quite distributive and vasodilated with the hypothermia and subsequent rewarming     High anion gap metabolic acidosis  Assessment & Plan  Unclear etiology  Lactate predominantly, possible contributing ketosis  Blood glucose WNL, no diabetes so far as we know, no SGLTi on med list  Improved    Volume resus  Empiric abx  Thiamine  Trend    Tachyarrhythmia  Assessment & Plan  Reported AF RVR in ED  EKG difficult to interpret but appears to be sections of sinus with ortiz waves, and a section of AF RVR with  some ST changes  Repeat EKG in the ICU once clinically stable shows sinus without STEMI  Placed on Amio in ED, will discontinue for the time being and monitor for recurrence    No history of AF  Not a great candidate for AC, given falls, worsening functional status, and as presenting events indicate is at risk of head injury and a bleed risk from trauma will reevaluate with the family    TSH WNL  Optimize electrolytes  Continue volume resuscitation based on serial hemodynamic evaluation, appears hypovolemic overall    Tele  EKG PRN  TTE           Hypotension  Assessment & Plan  Recurrent in ED, volume responsive  POCUS suggests low RAP high SV state, distributive versus hypovolemic  Likely quite hypovolemic/distributive given hypothermia and subsequent vasoplegia associated with rewarming  Continue to volume tolerant/responsive    Unclear if this is sepsis but will volume resus and give empiric abx  EKG non diagnostic  Trop flat  CXR without focal infiltrate  UA with bacteruria, no pyuria    Lactate normalized  Seems volume tolerant will continue to bolus (30/kg given) crystalloid for now  Norepi to maintain MAP > 65  Thiamine  Empiric Vanc and CTX, given, deescalate to CTX, follow cultures and urine      CAD (coronary artery disease)- (present on admission)  Assessment & Plan  Noted on MPI, history not entirely clear as of yet  Not on AT therapy  Initial EKG with some ischemic appearing ST depressions but difficult to discern from AF with some NSST  No evidence of cardiogenic shock or WMA or depressed fxn on bedside sono    TTE non diagnostic, preserved fxn  Trop flat  EKG PRN       I have performed a physical exam and reviewed and updated ROS and Plan today (12/13/2024). In review of yesterday's note (12/12/2024), there are no changes except as documented above.     Discussed patient condition and risk of morbidity and/or mortality with Hospitalist, RN, RT, Therapies, and Pharmacy

## 2024-12-13 NOTE — HOSPITAL COURSE
12/12-Admit ICU, hypothermia (external warming),  hypotension, rhabdo, OPAL, titrating NE, dementia and agitation requiring multiple interventions, had AF RVR in ED given amioadarone bolus and gtt which was discontinued d/t bradycardia, multiple fluid boluses    12/13-HD stable, off vasoactives, 1L LR given bolus, some agitation overnight, start VTE ppx, passed swallow, diet started

## 2024-12-13 NOTE — PROGRESS NOTES
Notified Rach Flores of pt's impulsiveness and attempting to pull lines and get OOB even after extra doses of haldol. Can we restart wrist restraints? yes

## 2024-12-13 NOTE — CARE PLAN
Problem: Safety - Medical Restraint  Goal: Remains free of injury from restraints (Restraint for Interference with Medical Device)  Outcome: Progressing     Problem: Knowledge Deficit - Standard  Goal: Patient and family/care givers will demonstrate understanding of plan of care, disease process/condition, diagnostic tests and medications  Outcome: Progressing     Problem: Hemodynamics  Goal: Patient's hemodynamics, fluid balance and neurologic status will be stable or improve  Outcome: Progressing     Problem: Hemodynamics  Goal: Patient's hemodynamics, fluid balance and neurologic status will be stable or improve  Outcome: Progressing     Problem: Pain - Standard  Goal: Alleviation of pain or a reduction in pain to the patient’s comfort goal  Outcome: Progressing     Problem: Skin Integrity  Goal: Skin integrity is maintained or improved  Outcome: Progressing     Problem: Fall Risk  Goal: Patient will remain free from falls  Outcome: Progressing   The patient is Watcher - Medium risk of patient condition declining or worsening         Progress made toward(s) clinical / shift goals:  Pt SB to SR with no Afib    Patient is not progressing towards the following goals: Remains on levo with poor urine output. Pt remains agitated and restless.

## 2024-12-13 NOTE — THERAPY
Speech Language Pathology   Clinical Swallow Evaluation     Patient Name: Jamie Pacheco  AGE:  79 y.o., SEX:  male  Medical Record #: [id number]  Date of Service: 12/13/2024      History of Present Illness  80 y/o M admitted 12/12/24 with hypothermia, head injury and AMS.     PMHx: Alzheimer's, prostate cancer s/p radiation tx, diverticulitis s/p hemicolectomy.     CXR 12/13: 1.  No acute cardiopulmonary disease.  2.  Atherosclerosis    CT C-spine 12/12: 1.  Multilevel degenerative changes of the cervical spine limit diagnostic sensitivity of this examination, otherwise no acute traumatic bony injury of the cervical spine is apparent.    CTH 12/12: 1.  No acute intracranial abnormality is identified, there are nonspecific white matter changes, commonly associated with small vessel ischemic disease.  Associated mild cerebral atrophy is noted.  2.  Focus of air superior to the left globe anteriorly, could be within vascular structure related to intravenous access, otherwise of uncertain significance.  3.  Atherosclerosis.    General Information:  Vitals  O2 Delivery Device: Room air w/o2 available  Level of Consciousness: Awake  Patient Behaviors: Confused  Orientation: Self  Follows Directives: Inconsistent    Prior Living Situation & Level of Function:  Housing / Facility: Unable To Determine At This Time  Lives with - Patient's Self Care Capacity: Adult Children  Communication: Impaired - hx of AD  Swallowing: WFL     Oral Mechanism Evaluation:  Dentition: Fair, Missing posterior dentition   Facial Symmetry: Equal  Facial Sensation: Pt did not follow commands to assess     Labial Observations: WFL   Lingual Observations: Midline  Motor Speech: WFL    Laryngeal Function:  Secretion Management: Adequate  Voice Quality: WFL  Cough: Pt did not follow commands to assess    Subjective  Patient was awake, restless on arrival but participated with SLP tasks. He was not able to provide PLOF and was only oriented to  self      Assessment  Current Method of Nutrition: NPO until cleared by speech pathology  Positioning: Teixeira's (60-90 degrees)  Bolus Administration: SLP, Patient    O2 Delivery Device: Room air w/o2 available  Factor(s) Affecting Performance: Impaired endurance, Impaired mental status, Impaired command following    Swallowing Trials:  Swallowing Trials  Thin Liquid (TN0): WFL  Pureed (PU4): WFL  Regular (RG7): WFL      Comments: Patient was given ice chips, water via tsp/cup/straw, pureed and regular solids. Bolus acceptance and containment were intact. Mastication of dry solid was slow but appeared complete. No oral pocketing appreciated. Pt was not able to modulate his bite sizes well with a spoon and took oversized bites. Pharyngeal swallow response appeared timely. No s/sx of airway invasion or pharyngeal inefficiency appreciated. Vocal quality remained clear. Once pt finished his cracker, he tried chewing on the pulse ox attached to his hand.       Clinical Impressions  Patient presents with a relatively intact oropharyngeal swallow. Diet modification and supervision for meals are indicated mainly due to cognitive impairments. Suspect a soft and bite sized diet will be easier for self-feeding and better for safety. SLP will f/u to ensure diet tolerance.     Recommendations  Diet Consistency: Soft and bite sized, Thin liquids  Instrumentation: None indicated at this time  Medication: As tolerated  Supervision: 1:1 feeding with constant supervision  Positioning: Fully upright and midline during oral intake  Risk Management : Small bites/sips, Slow rate of intake, Reduce environmental distractions  Oral Care:  (after meals)    SLP Treatment Plan  Treatment Plan: Dysphagia Treatment, Patient/Family/Caregiver Training  SLP Frequency: 3x Per Week  Estimated Duration: Until Therapy Goals Met      Anticipated Discharge Needs  Discharge Recommendations: Anticipate that the patient will have no further speech therapy  "needs after discharge from the hospital   Therapy Recommendations Upon DC: Not Indicated        Patient / Family Goals  Patient / Family Goal #1: \"this is good.\" (pudding)  Short Term Goals  Short Term Goal # 1: Patient will consume least restrictive diet with no s/sx of airway invasion or associated respiratory distress.      Betina Kay MS,CCC-SLP   "

## 2024-12-13 NOTE — PROGRESS NOTES
Report given to HAROLDO Jain at 1200.  Pt pulled out RAC PIV at 1220 prior to [place] [facility]  Susy aware.  Order for bilat mitts to be placed.  Pt transferred via bed on monitor by Andrew RN at 1230.

## 2024-12-13 NOTE — PROGRESS NOTES
Notified Rach Flores MD resident of pt's agitation even after haldol 5mg was administered. Is there something else we can do? Dex decreases HR down to 30s.

## 2024-12-14 LAB
ALBUMIN SERPL BCP-MCNC: 2.6 G/DL (ref 3.2–4.9)
ALBUMIN/GLOB SERPL: 1 G/DL
ALP SERPL-CCNC: 60 U/L (ref 30–99)
ALT SERPL-CCNC: 32 U/L (ref 2–50)
ANION GAP SERPL CALC-SCNC: 7 MMOL/L (ref 7–16)
AST SERPL-CCNC: 62 U/L (ref 12–45)
BACTERIA UR CULT: NORMAL
BASOPHILS # BLD AUTO: 0.1 % (ref 0–1.8)
BASOPHILS # BLD: 0.01 K/UL (ref 0–0.12)
BILIRUB SERPL-MCNC: 0.2 MG/DL (ref 0.1–1.5)
BUN SERPL-MCNC: 22 MG/DL (ref 8–22)
CALCIUM ALBUM COR SERPL-MCNC: 9.6 MG/DL (ref 8.5–10.5)
CALCIUM SERPL-MCNC: 8.5 MG/DL (ref 8.5–10.5)
CHLORIDE SERPL-SCNC: 111 MMOL/L (ref 96–112)
CK SERPL-CCNC: 731 U/L (ref 0–154)
CO2 SERPL-SCNC: 20 MMOL/L (ref 20–33)
CREAT SERPL-MCNC: 1.01 MG/DL (ref 0.5–1.4)
EOSINOPHIL # BLD AUTO: 0.02 K/UL (ref 0–0.51)
EOSINOPHIL NFR BLD: 0.2 % (ref 0–6.9)
ERYTHROCYTE [DISTWIDTH] IN BLOOD BY AUTOMATED COUNT: 41.9 FL (ref 35.9–50)
GFR SERPLBLD CREATININE-BSD FMLA CKD-EPI: 77 ML/MIN/1.73 M 2
GLOBULIN SER CALC-MCNC: 2.5 G/DL (ref 1.9–3.5)
GLUCOSE SERPL-MCNC: 82 MG/DL (ref 65–99)
HCT VFR BLD AUTO: 24.9 % (ref 42–52)
HGB BLD-MCNC: 8 G/DL (ref 14–18)
IMM GRANULOCYTES # BLD AUTO: 0.04 K/UL (ref 0–0.11)
IMM GRANULOCYTES NFR BLD AUTO: 0.4 % (ref 0–0.9)
LYMPHOCYTES # BLD AUTO: 1.51 K/UL (ref 1–4.8)
LYMPHOCYTES NFR BLD: 15.7 % (ref 22–41)
MAGNESIUM SERPL-MCNC: 2.1 MG/DL (ref 1.5–2.5)
MCH RBC QN AUTO: 27.1 PG (ref 27–33)
MCHC RBC AUTO-ENTMCNC: 32.1 G/DL (ref 32.3–36.5)
MCV RBC AUTO: 84.4 FL (ref 81.4–97.8)
MONOCYTES # BLD AUTO: 0.42 K/UL (ref 0–0.85)
MONOCYTES NFR BLD AUTO: 4.4 % (ref 0–13.4)
NEUTROPHILS # BLD AUTO: 7.64 K/UL (ref 1.82–7.42)
NEUTROPHILS NFR BLD: 79.2 % (ref 44–72)
NRBC # BLD AUTO: 0 K/UL
NRBC BLD-RTO: 0 /100 WBC (ref 0–0.2)
PHOSPHATE SERPL-MCNC: 1.8 MG/DL (ref 2.5–4.5)
PLATELET # BLD AUTO: 280 K/UL (ref 164–446)
PMV BLD AUTO: 9 FL (ref 9–12.9)
POTASSIUM SERPL-SCNC: 3.4 MMOL/L (ref 3.6–5.5)
PROT SERPL-MCNC: 5.1 G/DL (ref 6–8.2)
RBC # BLD AUTO: 2.95 M/UL (ref 4.7–6.1)
SIGNIFICANT IND 70042: NORMAL
SITE SITE: NORMAL
SODIUM SERPL-SCNC: 138 MMOL/L (ref 135–145)
SOURCE SOURCE: NORMAL
WBC # BLD AUTO: 9.6 K/UL (ref 4.8–10.8)

## 2024-12-14 PROCEDURE — 700101 HCHG RX REV CODE 250

## 2024-12-14 PROCEDURE — 83735 ASSAY OF MAGNESIUM: CPT

## 2024-12-14 PROCEDURE — 700102 HCHG RX REV CODE 250 W/ 637 OVERRIDE(OP)

## 2024-12-14 PROCEDURE — 99232 SBSQ HOSP IP/OBS MODERATE 35: CPT | Performed by: HOSPITALIST

## 2024-12-14 PROCEDURE — A9270 NON-COVERED ITEM OR SERVICE: HCPCS

## 2024-12-14 PROCEDURE — 80053 COMPREHEN METABOLIC PANEL: CPT

## 2024-12-14 PROCEDURE — 700111 HCHG RX REV CODE 636 W/ 250 OVERRIDE (IP): Mod: JZ | Performed by: HOSPITALIST

## 2024-12-14 PROCEDURE — 84100 ASSAY OF PHOSPHORUS: CPT

## 2024-12-14 PROCEDURE — A9270 NON-COVERED ITEM OR SERVICE: HCPCS | Performed by: HOSPITALIST

## 2024-12-14 PROCEDURE — 770006 HCHG ROOM/CARE - MED/SURG/GYN SEMI*

## 2024-12-14 PROCEDURE — 700102 HCHG RX REV CODE 250 W/ 637 OVERRIDE(OP): Performed by: HOSPITALIST

## 2024-12-14 PROCEDURE — A9270 NON-COVERED ITEM OR SERVICE: HCPCS | Performed by: EMERGENCY MEDICINE

## 2024-12-14 PROCEDURE — 700105 HCHG RX REV CODE 258: Performed by: HOSPITALIST

## 2024-12-14 PROCEDURE — 700111 HCHG RX REV CODE 636 W/ 250 OVERRIDE (IP): Performed by: EMERGENCY MEDICINE

## 2024-12-14 PROCEDURE — 700101 HCHG RX REV CODE 250: Performed by: HOSPITALIST

## 2024-12-14 PROCEDURE — 700102 HCHG RX REV CODE 250 W/ 637 OVERRIDE(OP): Performed by: EMERGENCY MEDICINE

## 2024-12-14 PROCEDURE — 82550 ASSAY OF CK (CPK): CPT

## 2024-12-14 PROCEDURE — 85025 COMPLETE CBC W/AUTO DIFF WBC: CPT

## 2024-12-14 RX ORDER — QUETIAPINE FUMARATE 25 MG/1
50 TABLET, FILM COATED ORAL 2 TIMES DAILY
Status: DISCONTINUED | OUTPATIENT
Start: 2024-12-14 | End: 2024-12-15 | Stop reason: HOSPADM

## 2024-12-14 RX ORDER — HALOPERIDOL 5 MG/ML
1 INJECTION INTRAMUSCULAR EVERY 4 HOURS PRN
Status: DISCONTINUED | OUTPATIENT
Start: 2024-12-14 | End: 2024-12-15 | Stop reason: HOSPADM

## 2024-12-14 RX ADMIN — QUETIAPINE FUMARATE 50 MG: 25 TABLET ORAL at 17:04

## 2024-12-14 RX ADMIN — DEXMEDETOMIDINE HYDROCHLORIDE 0.2 MCG/KG/HR: 4 INJECTION, SOLUTION INTRAVENOUS at 01:36

## 2024-12-14 RX ADMIN — RIVAROXABAN 10 MG: 10 TABLET, FILM COATED ORAL at 17:04

## 2024-12-14 RX ADMIN — HALOPERIDOL LACTATE 1 MG: 5 INJECTION, SOLUTION INTRAMUSCULAR at 07:39

## 2024-12-14 RX ADMIN — SENNOSIDES AND DOCUSATE SODIUM 2 TABLET: 50; 8.6 TABLET ORAL at 17:04

## 2024-12-14 RX ADMIN — POTASSIUM PHOSPHATE, MONOBASIC AND POTASSIUM PHOSPHATE, DIBASIC 30 MMOL: 224; 236 INJECTION, SOLUTION, CONCENTRATE INTRAVENOUS at 12:56

## 2024-12-14 RX ADMIN — THIAMINE HYDROCHLORIDE 200 MG: 100 INJECTION, SOLUTION INTRAMUSCULAR; INTRAVENOUS at 05:04

## 2024-12-14 RX ADMIN — DONEPEZIL HYDROCHLORIDE 10 MG: 5 TABLET, FILM COATED ORAL at 22:01

## 2024-12-14 RX ADMIN — HALOPERIDOL LACTATE 1 MG: 5 INJECTION, SOLUTION INTRAMUSCULAR at 11:55

## 2024-12-14 ASSESSMENT — PAIN SCALES - PAIN ASSESSMENT IN ADVANCED DEMENTIA (PAINAD)
BREATHING: NORMAL
TOTALSCORE: 0
CONSOLABILITY: NO NEED TO CONSOLE
BODYLANGUAGE: TENSE, DISTRESSED PACING, FIDGETING
FACIALEXPRESSION: SMILING OR INEXPRESSIVE
FACIALEXPRESSION: SMILING OR INEXPRESSIVE
BODYLANGUAGE: RELAXED
FACIALEXPRESSION: SMILING OR INEXPRESSIVE
BREATHING: NORMAL
TOTALSCORE: 0
BODYLANGUAGE: RELAXED
BREATHING: NORMAL
BODYLANGUAGE: RELAXED
CONSOLABILITY: UNABLE TO CONSOLE, DISTRACT OR REASSURE
CONSOLABILITY: NO NEED TO CONSOLE
TOTALSCORE: 3
CONSOLABILITY: NO NEED TO CONSOLE
TOTALSCORE: 0
FACIALEXPRESSION: SMILING OR INEXPRESSIVE
BREATHING: NORMAL

## 2024-12-14 ASSESSMENT — PAIN DESCRIPTION - PAIN TYPE
TYPE: ACUTE PAIN

## 2024-12-14 ASSESSMENT — FIBROSIS 4 INDEX
FIB4 SCORE: 2.53
FIB4 SCORE: 3.01

## 2024-12-14 NOTE — PROGRESS NOTES
Monitor Summary:     Rhythm: Sinus Bradycardia/Sinus Rhythm  Rate: 43-70s  Ectopy: None    Around 0455 Patient sustained sinus bradycardia HR 28 for 10 seconds. Dexmedetomidine discontinued

## 2024-12-14 NOTE — PROGRESS NOTES
Report received from HAROLDO Becker from [facility].    4 Eyes Skin Assessment Completed by HAROLDO Jain and HAROLDO Menchaca.    Head Scab, Blanching, Bruising, Scratch, Redness, and Scar  Ears Redness, Blanching, and Discoloration  Nose WDL  Mouth Ulcer(s)  Neck Blanching, Discoloration, and Bruising  Breast/Chest Abrasion, Bruising, Scab, and Scar  Shoulder Blades WDL  Spine WDL  (R) Arm/Elbow/Hand Redness, Blanching, Bruising, Abrasion, Scab, and Scar - skin tears  (L) Arm/Elbow/Hand Redness, Blanching, Bruising, Abrasion, Scab, and Scar - skin tears  Abdomen WDL  Groin Redness and Blanching - scab on right upper thigh  Scrotum/Coccyx/Buttocks Redness and Blanching  (R) Leg Redness, Blanching, Scab, and Bruising  (L) Leg Redness, Blanching, Scab, and Bruising  (R) Heel/Foot/Toe Redness, Blanching, and Bruising  (L) Heel/Foot/Toe Redness and Blanching          Devices In Places ECG, Blood Pressure Cuff, and Pulse Ox      Interventions In Place Heel Mepilex, Q2 Turns, Barrier Cream, and Pressure Redistribution Mattress    Possible Skin Injury Yes    Pictures Uploaded Into Epic Yes  Wound Consult Placed Yes  RN Wound Prevention Protocol Ordered Yes

## 2024-12-14 NOTE — ASSESSMENT & PLAN NOTE
Undifferentiated vaso distributive shock that does not appear to be sepsis  Cortisol level was robust at 44  IV Levophed drip which has been tapered off  IV fluids were given

## 2024-12-14 NOTE — CARE PLAN
The patient is Stable - Low risk of patient condition declining or worsening    Shift Goals  Clinical Goals: Monitor hemodynamic stability, restraint management, monitor I & O  Patient Goals: Feel better, rest  Family Goals: updates    Progress made toward(s) clinical / shift goals:    Problem: Safety - Medical Restraint  Goal: Remains free of injury from restraints (Restraint for Interference with Medical Device)  Outcome: Progressing  Goal: Free from restraint(s) (Restraint for Interference with Medical Device)  Outcome: Progressing     Problem: Knowledge Deficit - Standard  Goal: Patient and family/care givers will demonstrate understanding of plan of care, disease process/condition, diagnostic tests and medications  Outcome: Progressing     Problem: Hemodynamics  Goal: Patient's hemodynamics, fluid balance and neurologic status will be stable or improve  Outcome: Progressing     Problem: Fluid Volume  Goal: Fluid volume balance will be maintained  Outcome: Progressing     Problem: Urinary - Renal Perfusion  Goal: Ability to achieve and maintain adequate renal perfusion and functioning will improve  Outcome: Progressing       Patient is not progressing towards the following goals:

## 2024-12-14 NOTE — CARE PLAN
The patient is Watcher - Medium risk of patient condition declining or worsening    Shift Goals  Clinical Goals: Hemodynamic and neurologic stability, restraint management, I/O monitoring  Patient Goals: GRADY. Confused in conversation  Family Goals: GRADY    Progress made toward(s) clinical / shift goals:    Problem: Safety - Medical Restraint  Goal: Remains free of injury from restraints (Restraint for Interference with Medical Device)  Outcome: Progressing     Problem: Knowledge Deficit - Standard  Goal: Patient and family/care givers will demonstrate understanding of plan of care, disease process/condition, diagnostic tests and medications  Outcome: Progressing     Problem: Hemodynamics  Goal: Patient's hemodynamics, fluid balance and neurologic status will be stable or improve  Outcome: Progressing     Problem: Fluid Volume  Goal: Fluid volume balance will be maintained  Outcome: Progressing     Problem: Urinary - Renal Perfusion  Goal: Ability to achieve and maintain adequate renal perfusion and functioning will improve  Outcome: Progressing     Problem: Respiratory  Goal: Patient will achieve/maintain optimum respiratory ventilation and gas exchange  Outcome: Progressing     Problem: Pain - Standard  Goal: Alleviation of pain or a reduction in pain to the patient’s comfort goal  Outcome: Progressing     Problem: Skin Integrity  Goal: Skin integrity is maintained or improved  Outcome: Progressing       Patient is not progressing towards the following goals:      Problem: Safety - Medical Restraint  Goal: Free from restraint(s) (Restraint for Interference with Medical Device)  Outcome: Not Progressing   Patient required escalation to bilateral wrist restraints due to increasing attempts to get out of bed.  Problem: Fall Risk  Goal: Patient will remain free from falls  Outcome: Not Progressing    Patient required escalation to bilateral wrist restraints due to increasing attempts to get out of bed.

## 2024-12-14 NOTE — ASSESSMENT & PLAN NOTE
Known history of for which she is followed by Dr. Noriega neurology  He has been on Aricept which has been restarted  Added Seroquel 50 mg nightly and now make it twice a day due to agitation. Monitor for over-sedation. Qtc 439  He is in soft wrist restraints.  He was on a Precedex drip now stopped.  Plan on discharge home with his son, Ric on 12/15.

## 2024-12-14 NOTE — PROGRESS NOTES
Shift summary:     Around 0140, patient became increasingly agitated and repeatedly attempted to get out of bed. X3 calls by tele sitter in around an hour. Restarted dexmedetomidine at 0.2 mcg/kg/hr. Obtained orders for discontinuation of mitt restraints and initiation of wrist restraints    Around 0455 Patient sustained sinus bradycardia HR 28 for 10 seconds. MD JAROD Godinez at bedside. Dexmedetomidine discontinued. 1mg Haldol PRN ordered.

## 2024-12-14 NOTE — ASSESSMENT & PLAN NOTE
History of details are unclear though   Pt rounding, second set of bc drawn from exisiting iv. Ice bags to rle per pt request, call light in reach, denies needs

## 2024-12-14 NOTE — PROGRESS NOTES
Hospital Medicine Daily Progress Note    Date of Service  12/14/2024    Chief Complaint  Jamie Pacheco is a 79 y.o. male admitted 12/12/2024 with agitation.     Hospital Course  Mr. Pacheco has a past  medical history of coronary disease without intervention, lung cancer s/p resection, dementia followed by Dr. Noriega neurology that was brought to the ER after family noted that he had soiled himself and he became very aggressive and combative and in the shower apparently would not get out tried to choke himself in the shower curtain smashed his head against the plexiglass shower door which shattered.  Apparently he has been more aggressive lately.  Emergency room is found to have a white blood cell count of 20,000 with a metabolic acidosis bicarb of 18 and anion gap of 18 and lactic acid of 4.9.  Cultures were drawn he was empirically started on antibiotics.  An extensive trauma workup was negative.  Due to hypothermia with core temperature of 30 °C which was 86 °F he had active warming measures.  Was found to have rhabdomyolysis with a CPK of 1903 was treated with IV fluids.  He was hypotensive requiring IV Levophed drip which was tapered off.       Interval Problem Update  12/14: Mr. Pacheco was evaluated in the IMCU. IV pressors remain off. His WBC is 9.6 and CPK remains high at 731 though down from yesterday. K is low at 3.4 and Phos is low at 1.8 which will be replaced. I called his son, Ric [phone] and we discussed his condition. He has lived with him for the last year. He hopes to take him home but cannot get here due to snow at his house.    I have discussed this patient's plan of care and discharge plan at IDT rounds today with Case Management, Nursing, Nursing leadership, and other members of the IDT team.    Consultants/Specialty  none    Code Status  DNAR/DNI    Disposition  The patient is medically cleared for discharge to home or a post-acute facility.  Anticipate discharge to: home with  close outpatient follow-up    I have placed the appropriate orders for post-discharge needs.    Review of Systems  Review of Systems   Unable to perform ROS: Dementia        Physical Exam  Temp:  [36.3 °C (97.3 °F)-37 °C (98.6 °F)] 36.8 °C (98.2 °F)  Pulse:  [50-84] 84  Resp:  [11-31] 20  BP: ()/(51-89) 157/89  SpO2:  [92 %-100 %] 99 %    Physical Exam  Vitals and nursing note reviewed.   Constitutional:       Comments: Very thin   Cardiovascular:      Rate and Rhythm: Normal rate and regular rhythm.   Pulmonary:      Effort: Pulmonary effort is normal.      Breath sounds: Normal breath sounds.   Musculoskeletal:      Right lower leg: No edema.      Left lower leg: No edema.   Neurological:      Mental Status: He is alert.      Comments: Oriented to name only         Fluids    Intake/Output Summary (Last 24 hours) at 12/14/2024 0711  Last data filed at 12/14/2024 0600  Gross per 24 hour   Intake 425.59 ml   Output 1290 ml   Net -864.41 ml        Laboratory  Recent Labs     12/12/24  0357 12/13/24  0552 12/14/24  0355   WBC 20.8* 12.0* 9.6   RBC 3.97* 3.10* 2.95*   HEMOGLOBIN 10.6* 8.3* 8.0*   HEMATOCRIT 32.8* 25.6* 24.9*   MCV 82.6 82.6 84.4   MCH 26.7* 26.8* 27.1   MCHC 32.3 32.4 32.1*   RDW 41.9 41.3 41.9   PLATELETCT 603* 365 280   MPV 8.6* 9.2 9.0     Recent Labs     12/12/24  1427 12/13/24  0552 12/14/24  0355   SODIUM 142 141 138   POTASSIUM 3.7 3.9 3.4*   CHLORIDE 111 111 111   CO2 16* 19* 20   GLUCOSE 78 96 82   BUN 31* 28* 22   CREATININE 1.22 1.12 1.01   CALCIUM 8.4* 8.5 8.5     Recent Labs     12/12/24  0410   APTT 25.8   INR 1.11               Imaging  EC-ECHOCARDIOGRAM COMPLETE W/O CONT   Final Result      DX-CHEST-PORTABLE (1 VIEW)   Final Result         1.  No acute cardiopulmonary disease.   2.  Atherosclerosis      CT-CSPINE WITHOUT PLUS RECONS   Final Result         1.  Multilevel degenerative changes of the cervical spine limit diagnostic sensitivity of this examination, otherwise no acute  traumatic bony injury of the cervical spine is apparent.      CT-HEAD W/O   Final Result         1.  No acute intracranial abnormality is identified, there are nonspecific white matter changes, commonly associated with small vessel ischemic disease.  Associated mild cerebral atrophy is noted.   2.  Focus of air superior to the left globe anteriorly, could be within vascular structure related to intravenous access, otherwise of uncertain significance.   3.  Atherosclerosis.                    Assessment/Plan  * Shock (HCC)- (present on admission)  Assessment & Plan  Undifferentiated vaso distributive shock that does not appear to be sepsis  Cortisol level was robust at 44  IV Levophed drip which has been tapered off  IV fluids were given and have been stopped    Dementia (HCC)- (present on admission)  Assessment & Plan  Known history of for which she is followed by Dr. Noriega neurology  He has been on Aricept which has been restarted  Added Seroquel 50 mg nightly and now make it twice a day due to agitation. Monitor for over-sedation. Qtc 439  He is in soft wrist restraints.  He was on a Precedex drip now stopped.  Plan on discharge home with his son, Ric on 12/15.    Rhabdomyolysis- (present on admission)  Assessment & Plan  CPK 1900 emergency room for which he received IV fluids and CPK has trended down    High anion gap metabolic acidosis- (present on admission)  Assessment & Plan  Resolved with IV fluids    Accidental hypothermia, initial encounter- (present on admission)  Assessment & Plan  Core temperature was 30 °C for which he had active warming    CAD (coronary artery disease)- (present on admission)  Assessment & Plan  History of details are unclear though    Personal history of malignant neoplasm of bronchus and lung- (present on admission)  Assessment & Plan  History of partial lung resection details are unclear         VTE prophylaxis:    Xarelto 10mg daily as prophylaxis      I have performed a physical  exam and reviewed and updated ROS and Plan today (12/14/2024). In review of yesterday's note (12/13/2024), there are no changes except as documented above.

## 2024-12-14 NOTE — PROGRESS NOTES
4 Eyes Skin Assessment Completed by HAROLDO Mirza and Brissa PULIDO RN.    Head Scab, Blanching, Bruising, Scratch, and Redness  Ears Redness and Blanching scratch  Nose Redness scratch  Mouth Dry, ulcer  Neck Redness and Blanching  Breast/Chest Bruising and Scab scratch  Shoulder Blades WDL  Spine WDL  (R) Arm/Elbow/Hand Redness, Blanching, Bruising, Abrasion, Scab, and Scar, skin tear  (L) Arm/Elbow/Hand Redness, Blanching, Bruising, Abrasion, Scab, and Scar, skin tear  Abdomen WDL  Groin Redness and Blanching scratch  Scrotum/Coccyx/Buttocks Redness and Blanching  (R) Leg Redness, Blanching, and Scab bruising L hip  (L) Leg Redness, Blanching, and Scab scar  (R) Heel/Foot/Toe Redness and Blanching  (L) Heel/Foot/Toe Redness and Blanching                                                  Interventions In Place Heel Mepilex, TAP System, Pillows, Q2 Turns, Low Air Loss Mattress, and Barrier Cream  Possible Skin Injury Yes  Pictures Uploaded Into Epic Yes  Wound Consult Placed Wound team following  RN Wound Prevention Protocol Ordered Yes

## 2024-12-14 NOTE — CARE PLAN
The patient is Watcher - Medium risk of patient condition declining or worsening    Shift Goals  Clinical Goals: Hemodynamic and neurologic stability, restraint management, I/O monitoring  Patient Goals: GRADY. Confused in conversation  Family Goals: GRADY    Progress made toward(s) clinical / shift goals:        Problem: Safety - Medical Restraint  Goal: Remains free of injury from restraints (Restraint for Interference with Medical Device)  Outcome: Progressing  Flowsheets (Taken 12/14/2024 0917)  Addressed this shift: Remains free of injury from restraints (restraint for interference with medical device):   Determine that other, less restrictive measures have been tried or would not be effective before applying the restraint   Evaluate the patient's condition at the time of restraint application   Inform patient/family regarding the reason for restraint   Every 2 hours: Monitor safety, psychosocial status, comfort, nutrition and hydration     Problem: Hemodynamics  Goal: Patient's hemodynamics, fluid balance and neurologic status will be stable or improve  Outcome: Progressing  Note: BP stabilized, VS taken Q2 hours, pt on continuous cardiac monitoring. Daily labs drawn.      Problem: Pain - Standard  Goal: Alleviation of pain or a reduction in pain to the patient’s comfort goal  Outcome: Progressing  Note: Pt assessed for pain regularly and medicated PRN per MAR.

## 2024-12-14 NOTE — ASSESSMENT & PLAN NOTE
Undifferentiated vaso distributive shock that does not appear to be sepsis  Cortisol level was robust at 44  IV Levophed drip which has been tapered off  IV fluids were given and have been stopped

## 2024-12-15 VITALS
WEIGHT: 113.54 LBS | HEIGHT: 67 IN | SYSTOLIC BLOOD PRESSURE: 121 MMHG | HEART RATE: 66 BPM | RESPIRATION RATE: 18 BRPM | DIASTOLIC BLOOD PRESSURE: 88 MMHG | OXYGEN SATURATION: 95 % | BODY MASS INDEX: 17.82 KG/M2 | TEMPERATURE: 97.8 F

## 2024-12-15 PROCEDURE — A9270 NON-COVERED ITEM OR SERVICE: HCPCS | Performed by: HOSPITALIST

## 2024-12-15 PROCEDURE — 99239 HOSP IP/OBS DSCHRG MGMT >30: CPT | Performed by: HOSPITALIST

## 2024-12-15 PROCEDURE — 700102 HCHG RX REV CODE 250 W/ 637 OVERRIDE(OP): Performed by: HOSPITALIST

## 2024-12-15 RX ORDER — QUETIAPINE FUMARATE 25 MG/1
25-50 TABLET, FILM COATED ORAL
Status: DISCONTINUED | OUTPATIENT
Start: 2024-12-15 | End: 2024-12-23

## 2024-12-15 RX ADMIN — QUETIAPINE FUMARATE 50 MG: 25 TABLET ORAL at 04:26

## 2024-12-15 ASSESSMENT — PAIN SCALES - PAIN ASSESSMENT IN ADVANCED DEMENTIA (PAINAD)
CONSOLABILITY: NO NEED TO CONSOLE
FACIALEXPRESSION: SMILING OR INEXPRESSIVE
TOTALSCORE: 0
BREATHING: NORMAL
BODYLANGUAGE: RELAXED

## 2024-12-15 NOTE — CARE PLAN
The patient is Watcher - Medium risk of patient condition declining or worsening    Shift Goals  Clinical Goals: restraints, Maintain skin integrity  Patient Goals: grecia  Family Goals: grecia    Patient A&Ox0, on restraints. Patient still agitated and confused, on restraints. Hourly rounding completed, patient turned Q2. Patient remained safe throughout shift. Bed wheels locked, bed in lowest position, room near nurses station. All needs met at this time.     Progress made toward(s) clinical / shift goals:      Problem: Safety - Medical Restraint  Goal: Remains free of injury from restraints (Restraint for Interference with Medical Device)  Outcome: Progressing     Problem: Knowledge Deficit - Standard  Goal: Patient and family/care givers will demonstrate understanding of plan of care, disease process/condition, diagnostic tests and medications  Outcome: Progressing     Problem: Skin Integrity  Goal: Skin integrity is maintained or improved  Outcome: Progressing

## 2024-12-15 NOTE — DISCHARGE SUMMARY
"Discharge Summary    CHIEF COMPLAINT ON ADMISSION  Chief Complaint   Patient presents with    Head Injury    ALOC       Reason for Admission  TBI     Admission Date  12/12/2024    CODE STATUS  DNAR/DNI    HPI & HOSPITAL COURSE  This is a 79 y.o. male here with agitation   Mr. Pacheco has a past  medical history of coronary disease without intervention, lung cancer s/p resection, dementia followed by Dr. Noriega neurology that was brought to the ER after family noted that he had soiled himself and he became very aggressive and combative and in the shower apparently would not get out tried to choke himself in the shower curtain smashed his head against the plexiglass shower door which shattered.  Apparently he has been more aggressive lately.  Emergency room is found to have a white blood cell count of 20,000 with a metabolic acidosis bicarb of 18 and anion gap of 18 and lactic acid of 4.9.  Cultures were drawn he was empirically started on antibiotics.  An extensive trauma workup was negative.  Due to hypothermia with core temperature of 30 °C which was 86 °F he had active warming measures.  Was found to have rhabdomyolysis with a CPK of 1903 was treated with IV fluids.  He was hypotensive requiring IV Levophed drip which was tapered off.  He had transient atrial fibrillation for which cardiology was consulted he went back into sinus and stayed in sinus and no anticoagulation has been initiated.  Given his advanced dementia anticoagulation is not safe at this time though that may change in the future at the discretion of his primary care provider.  Echocardiogram revealed ejection fraction of 70%.  I spoke to his son, Ric and he states that his dad has lived with him and his family for the past year and has never had a \"psychotic break\" like this before.  He does want his dad to come back and live with him.  We put him on Seroquel 50 mg twice a day and have prescribed Seroquel to be used as needed once a day as an " outpatient.  He will follow-up as outpatient neurologist.    Therefore, he is discharged in fair and stable condition to home with close outpatient follow-up.    The patient met 2-midnight criteria for an inpatient stay at the time of discharge.    Discharge Date  12/15    FOLLOW UP ITEMS POST DISCHARGE  Home health ordered and home walker supplied  Follow-up with Dr. Britt PCP.    DISCHARGE DIAGNOSES  Principal Problem:    Shock (HCC) (POA: Yes)  Active Problems:    Dementia (HCC) (POA: Yes)    Personal history of malignant neoplasm of bronchus and lung (POA: Yes)    Personal history of malignant neoplasm of prostate (POA: Yes)    CAD (coronary artery disease) (POA: Yes)    Accidental hypothermia, initial encounter (POA: Yes)    Tachyarrhythmia (POA: Unknown)    High anion gap metabolic acidosis (POA: Yes)    Rhabdomyolysis (POA: Yes)  Resolved Problems:    Paroxysmal atrial fibrillation (HCC) (POA: Unknown)    Hypotension (POA: Unknown)      FOLLOW UP  No future appointments.  No follow-up provider specified.    MEDICATIONS ON DISCHARGE     Medication List        CONTINUE taking these medications        Instructions   * albuterol 2.5mg/3ml Nebu solution for nebulization  Commonly known as: Proventil   3 mL by Nebulization route every four hours as needed for Shortness of Breath.  Dose: 2.5 mg     * albuterol 108 (90 Base) MCG/ACT Aers inhalation aerosol   Inhale 2 Puffs every 6 hours as needed for Shortness of Breath.  Dose: 2 Puff     cetirizine 10 MG Tabs  Commonly known as: ZyrTEC   Take 10 mg by mouth every day.  Dose: 10 mg     donepezil 10 MG tablet  Commonly known as: Aricept   Doctor's comments: New Start. Review side effects with son. For Dementia.  Heart rate needs to be checked daily and if HR under 50 or lightheaded/dizzy do not continue medication !  Take 5 mg in am x 1 week then 10 mg in am     ICAPS Tabs   Take 1 Tablet by mouth every morning.  Dose: 1 Tablet     MAGNESIUM-ZINC PO   Take 1 Tablet  by mouth every morning.  Dose: 1 Tablet     multivitamin Tabs   Take 1 Tablet by mouth every day.  Dose: 1 Tablet     VITAMIN B-12 PO   Take 8 Drops by mouth every morning.  Dose: 8 Drop     vitamin D3 1000 Unit (25 mcg) Tabs  Commonly known as: Cholecalciferol   Take 2,000 Units by mouth every day.  Dose: 2,000 Units           * This list has 2 medication(s) that are the same as other medications prescribed for you. Read the directions carefully, and ask your doctor or other care provider to review them with you.                  Allergies  Allergies   Allergen Reactions    Ampicillin Unspecified     CAUSED A CHILDHOOD REACTION    Demerol Unspecified     CAUSES ECCHYMOSIS AND IS INEFFECTIVE    Clindamycin Diarrhea             DIET  Orders Placed This Encounter   Procedures    Diet Order Diet: Level 6 - Soft and Bite Sized; Liquid level: Level 0 - Thin; Second Modifier: (optional): Cardiac; Tray Modifications (optional): SLP - 1:1 Supervision by Nursing, SLP - Deliver to Nursing Station     Standing Status:   Standing     Number of Occurrences:   1     Order Specific Question:   Diet:     Answer:   Level 6 - Soft and Bite Sized [23]     Order Specific Question:   Liquid level     Answer:   Level 0 - Thin     Order Specific Question:   Second Modifier: (optional)     Answer:   Cardiac [6]     Order Specific Question:   Tray Modifications (optional)     Answer:   SLP - 1:1 Supervision by Nursing     Order Specific Question:   Tray Modifications (optional)     Answer:   SLP - Deliver to Nursing Station       ACTIVITY  As tolerated.      CONSULTATIONS  Critical care    PROCEDURES  Echocardiogram  CONCLUSIONS  Normal left ventricular systolic function.  The ejection fraction is measured to be 70% by Carmona's biplane.  Normal right ventricular size and systolic function.  Estimated right ventricular systolic pressure is 21 mmHg.  Mild biatrial dilation.  Eccentric, probably mild mitral regurgitation.  Mild aortic  insufficiency.  Small loculated pericardial effusion near right apical segment without   evidence of hemodynamic compromise.  Normal inferior vena cava size and inspiratory collapse    LABORATORY  Lab Results   Component Value Date    SODIUM 138 12/14/2024    POTASSIUM 3.4 (L) 12/14/2024    CHLORIDE 111 12/14/2024    CO2 20 12/14/2024    GLUCOSE 82 12/14/2024    BUN 22 12/14/2024    CREATININE 1.01 12/14/2024    CREATININE 1.2 04/28/2009      TSH 4.7  Lab Results   Component Value Date    WBC 9.6 12/14/2024    HEMOGLOBIN 8.0 (L) 12/14/2024    HEMATOCRIT 24.9 (L) 12/14/2024    PLATELETCT 280 12/14/2024      EC-ECHOCARDIOGRAM COMPLETE W/O CONT   Final Result      DX-CHEST-PORTABLE (1 VIEW)   Final Result         1.  No acute cardiopulmonary disease.   2.  Atherosclerosis      CT-CSPINE WITHOUT PLUS RECONS   Final Result         1.  Multilevel degenerative changes of the cervical spine limit diagnostic sensitivity of this examination, otherwise no acute traumatic bony injury of the cervical spine is apparent.      CT-HEAD W/O   Final Result         1.  No acute intracranial abnormality is identified, there are nonspecific white matter changes, commonly associated with small vessel ischemic disease.  Associated mild cerebral atrophy is noted.   2.  Focus of air superior to the left globe anteriorly, could be within vascular structure related to intravenous access, otherwise of uncertain significance.   3.  Atherosclerosis.                     Total time of the discharge process exceeds 32 minutes.

## 2024-12-15 NOTE — PROGRESS NOTES
4 Eyes Skin Assessment Completed by HAROLDO Jhaveri and HAROLDO Cardoza.    Head Scab, Bruising, and Redness  Ears Redness and Blanching, non blanchable scratch   Nose Redness, scratch   Mouth ulcer   Neck Redness and Blanching  Breast/Chest Bruising and Scab, scratch  Shoulder Blades WDL  Spine WDL  (R) Arm/Elbow/Hand Redness, Bruising, Abrasion, Scab, and Scar  (L) Arm/Elbow/Hand Redness, Bruising, Abrasion, Scab, and Scar  Abdomen WDL  Groin scratch  Scrotum/Coccyx/Buttocks Redness and Blanching,   (R) Leg Redness and Blanching  (L) Leg Redness and Blanching, left hip bruise  (R) Heel/Foot/Toe Redness and Blanching  (L) Heel/Foot/Toe Redness and Blanching          Devices In Places Condom Cath      Interventions In Place Heel Mepilex, Sacral Mepilex, TAP System, Pillows, Elbow Mepilex, Q2 Turns, Low Air Loss Mattress, and Barrier Cream    Possible Skin Injury Yes    Pictures Uploaded Into Epic Yes  Wound Consult Placed wound already consulted   RN Wound Prevention Protocol Ordered Yes

## 2024-12-15 NOTE — PROGRESS NOTES
Pt alert, unable to assess orientation, calm at this time. VSS. On room air. No signs of pain. Scheduled to DC today, son already at bedside to picked up pt, waiting for walker to be delivered. Son requested to remove restraints and will be at bedside until DC. No needs at this time, will continue to monitor.

## 2024-12-15 NOTE — FACE TO FACE
Face to Face Supporting Documentation - Home Health    The encounter with this patient was in whole or in part the primary reason for home health admission.    Date of encounter:   Patient:                    MRN:                       YOB: 2022  Jamie Pacheco  [id number]  1945     Home health to see patient for:  Skilled Nursing care for assessment, interventions & education    Skilled need for:  Exacerbation of Chronic Disease State dementia    Skilled nursing interventions to include:  Home safety eval    Homebound status evidenced by:  Need the aid of supportive devices such as crutches, canes, wheelchairs or walkers. Leaving home requires a considerable and taxing effort. There is a normal inability to leave the home.    Community Physician to provide follow up care: Luci Britt M.D.     Optional Interventions? No      I certify the face to face encounter for this home health care referral meets the CMS requirements and the encounter/clinical assessment with the patient was, in whole, or in part, for the medical condition(s) listed above, which is the primary reason for home health care. Based on my clinical findings: the service(s) are medically necessary, support the need for home health care, and the homebound criteria are met.  I certify that this patient has had a face to face encounter by myself.  Ryan Stark M.D. - NPI: [id number]

## 2024-12-15 NOTE — FACE TO FACE
Face to Face Note  -  Durable Medical Equipment    Ryan Stark M.D. - NPI: [id number]  I certify that this patient is under my care and that they had a durable medical equipment(DME)face to face encounter by myself that meets the physician DME face-to-face encounter requirements with this patient on:    Date of encounter:   Patient:                    MRN:                       YOB: 2022  Jamie Pacheco  [id number]  1945     The encounter with the patient was in whole, or in part, for the following medical condition, which is the primary reason for durable medical equipment:  dementia    I certify that, based on my findings, the following durable medical equipment is medically necessary:    Walkers.    My Clinical findings support the need for the above equipment due to:  Abnormal Gait

## 2024-12-17 LAB
BACTERIA BLD CULT: NORMAL
BACTERIA BLD CULT: NORMAL
SIGNIFICANT IND 70042: NORMAL
SIGNIFICANT IND 70042: NORMAL
SITE SITE: NORMAL
SITE SITE: NORMAL
SOURCE SOURCE: NORMAL
SOURCE SOURCE: NORMAL

## 2024-12-23 RX ORDER — QUETIAPINE FUMARATE 50 MG/1
25-50 TABLET, FILM COATED ORAL
Qty: 30 TABLET | Refills: 0 | Status: SHIPPED | OUTPATIENT
Start: 2024-12-23

## (undated) DEVICE — KIT ROOM DECONTAMINATION

## (undated) DEVICE — Device

## (undated) DEVICE — WATER IRRIGATION STERILE 1000ML (12EA/CA)

## (undated) DEVICE — PAD UNIVERSAL MULTI USE (1/EA)

## (undated) DEVICE — PACK TOTAL KNEE  (1/CA)

## (undated) DEVICE — HANDPIECE 10FT INTPLS SCT PLS IRRIGATION HAND CONTROL SET (6/PK)

## (undated) DEVICE — HEAD HOLDER JUNIOR/ADULT

## (undated) DEVICE — NEEDLE MAYO CATGUT TPR POINT - (2EA/PK20PK/BX)

## (undated) DEVICE — TOURNIQUET CUFF 34 X 4 ONE PORT DISP - STERILE (10/BX)

## (undated) DEVICE — MIXER BONE CEMENT REVOLUTION - W/FEMORAL PRESSURIZER (6/CA)

## (undated) DEVICE — DRESSING INTEGUSEAL MICROBIAL SEALANT IS100

## (undated) DEVICE — MASK AIRWAY SIZE 4 UNIQUE SILICON (10EA/BX)

## (undated) DEVICE — SUTURE 2-0 MONOCRYL PLUS UNDYED CT-1 1 X 36 (36EA/BX)"

## (undated) DEVICE — DRAPE IOBAN II 23 IN X 33 IN - (10/BX)

## (undated) DEVICE — BLADE RECIPROCATING 12.7 X 78.7 X 1.0MM (1/EA)

## (undated) DEVICE — HUMID-VENT HEAT AND MOISTURE EXCHANGE- (50/BX)

## (undated) DEVICE — STOCKINETTE IMPERVIOUS 12X48 - STERILELF (10/CA)"

## (undated) DEVICE — SYRINGE DISP. 60 CC LL - (30/BX, 12BX/CA)**WHEN THESE ARE GONE ORDER #500206**

## (undated) DEVICE — PROTECTOR ULNA NERVE - (36PR/CA)

## (undated) DEVICE — SENSOR SPO2 NEO LNCS ADHESIVE (20/BX) SEE USER NOTES

## (undated) DEVICE — TRAY SRGPRP PVP IOD WT PRP - (20/CA)

## (undated) DEVICE — CHLORAPREP 26 ML APPLICATOR - ORANGE TINT(25/CA)

## (undated) DEVICE — MASK ANESTHESIA ADULT  - (100/CA)

## (undated) DEVICE — SODIUM CHL. IRRIGATION 0.9% 3000ML (4EA/CA 65CA/PF)

## (undated) DEVICE — BLADE SAW 90X25X1.37MM SAGITTAL DUAL CUT

## (undated) DEVICE — TOWELS CLOTH SURGICAL - (4/PK 20PK/CA)

## (undated) DEVICE — NEEDLE SPINAL NON-SAFETY 18 GA X 3 IN (25EA/BX)

## (undated) DEVICE — PEN SKIN MARKER W/RULER - (50EA/BX)

## (undated) DEVICE — ELECTRODE 850 FOAM ADHESIVE - HYDROGEL RADIOTRNSPRNT (50/PK)

## (undated) DEVICE — LENS/HOOD FOR SPACESUIT - (32/PK) PEEL AWAY FACE

## (undated) DEVICE — TIP INTPLS HFLO ML ORFC BTRY - (12/CS)  FOR SURGILAV

## (undated) DEVICE — ELECTRODE DUAL RETURN W/ CORD - (50/PK)

## (undated) DEVICE — TUBE CONNECTING SUCTION - CLEAR PLASTIC STERILE 72 IN (50EA/CA)

## (undated) DEVICE — GLOVE BIOGEL PI ULTRATOUCH SZ 7.0 SURGICAL PF LF- POWDER FREE (50/BX 4BX/CA)

## (undated) DEVICE — GLOVE BIOGEL INDICATOR SZ 7.5 SURGICAL PF LTX - (50PR/BX 4BX/CA)

## (undated) DEVICE — LACTATED RINGERS INJ 1000 ML - (14EA/CA 60CA/PF)

## (undated) DEVICE — GLOVE SZ 7.5 LF PROTEXIS (50PR/BX)

## (undated) DEVICE — DRAPE U SPLIT IMP 54 X 76 - (24/CA)

## (undated) DEVICE — GOWN WARMING STANDARD FLEX - (30/CA)

## (undated) DEVICE — GLOVE BIOGEL PI INDICATOR SZ 7.0 SURGICAL PF LF - (50/BX 4BX/CA)

## (undated) DEVICE — GLOVE, LITE (PAIR)

## (undated) DEVICE — CANISTER SUCTION RIGID RED 1500CC (40EA/CA)

## (undated) DEVICE — DRAPE LARGE 3 QUARTER - (20/CA)

## (undated) DEVICE — SUTURE GENERAL

## (undated) DEVICE — KIT ANESTHESIA W/CIRCUIT & 3/LT BAG W/FILTER (20EA/CA)

## (undated) DEVICE — SUTURE 2-0 TI-CRON GS-25 - (24/BX)

## (undated) DEVICE — SLEEVE, SEQUENTIAL CALF REG

## (undated) DEVICE — SUCTION INSTRUMENT YANKAUER BULBOUS TIP W/O VENT (50EA/CA)

## (undated) DEVICE — DRESSING INTEGUSEAL MICROBIAL SEALANT IS100 (20EA/CA)

## (undated) DEVICE — STOCKINGTHIGH HIGH LARGE LONG - (6PR/BX)

## (undated) DEVICE — PAD LAP STERILE 18 X 18 - (5/PK 40PK/CA)

## (undated) DEVICE — SODIUM CHL IRRIGATION 0.9% 1000ML (12EA/CA)